# Patient Record
Sex: FEMALE | Race: WHITE | Employment: OTHER | URBAN - METROPOLITAN AREA
[De-identification: names, ages, dates, MRNs, and addresses within clinical notes are randomized per-mention and may not be internally consistent; named-entity substitution may affect disease eponyms.]

---

## 2017-01-01 ENCOUNTER — HOSPITAL ENCOUNTER (INPATIENT)
Facility: CLINIC | Age: 82
LOS: 22 days | DRG: 329 | End: 2017-07-10
Attending: EMERGENCY MEDICINE | Admitting: SURGERY
Payer: MEDICARE

## 2017-01-01 ENCOUNTER — APPOINTMENT (OUTPATIENT)
Dept: GENERAL RADIOLOGY | Facility: CLINIC | Age: 82
DRG: 329 | End: 2017-01-01
Payer: MEDICARE

## 2017-01-01 ENCOUNTER — RADIANT APPOINTMENT (OUTPATIENT)
Dept: GENERAL RADIOLOGY | Facility: CLINIC | Age: 82
End: 2017-01-01
Attending: NURSE PRACTITIONER
Payer: COMMERCIAL

## 2017-01-01 ENCOUNTER — ANESTHESIA (OUTPATIENT)
Dept: SURGERY | Facility: CLINIC | Age: 82
DRG: 329 | End: 2017-01-01
Payer: MEDICARE

## 2017-01-01 ENCOUNTER — APPOINTMENT (OUTPATIENT)
Dept: GENERAL RADIOLOGY | Facility: CLINIC | Age: 82
DRG: 329 | End: 2017-01-01
Attending: SURGERY
Payer: MEDICARE

## 2017-01-01 ENCOUNTER — APPOINTMENT (OUTPATIENT)
Dept: GENERAL RADIOLOGY | Facility: CLINIC | Age: 82
DRG: 329 | End: 2017-01-01
Attending: STUDENT IN AN ORGANIZED HEALTH CARE EDUCATION/TRAINING PROGRAM
Payer: MEDICARE

## 2017-01-01 ENCOUNTER — APPOINTMENT (OUTPATIENT)
Dept: PHYSICAL THERAPY | Facility: CLINIC | Age: 82
DRG: 329 | End: 2017-01-01
Payer: MEDICARE

## 2017-01-01 ENCOUNTER — OFFICE VISIT (OUTPATIENT)
Dept: FAMILY MEDICINE | Facility: CLINIC | Age: 82
End: 2017-01-01
Payer: COMMERCIAL

## 2017-01-01 ENCOUNTER — TELEPHONE (OUTPATIENT)
Dept: FAMILY MEDICINE | Facility: CLINIC | Age: 82
End: 2017-01-01

## 2017-01-01 ENCOUNTER — APPOINTMENT (OUTPATIENT)
Dept: OCCUPATIONAL THERAPY | Facility: CLINIC | Age: 82
DRG: 329 | End: 2017-01-01
Payer: MEDICARE

## 2017-01-01 ENCOUNTER — APPOINTMENT (OUTPATIENT)
Dept: CARDIOLOGY | Facility: CLINIC | Age: 82
DRG: 329 | End: 2017-01-01
Attending: PHYSICIAN ASSISTANT
Payer: MEDICARE

## 2017-01-01 ENCOUNTER — APPOINTMENT (OUTPATIENT)
Dept: GENERAL RADIOLOGY | Facility: CLINIC | Age: 82
DRG: 329 | End: 2017-01-01
Attending: EMERGENCY MEDICINE
Payer: MEDICARE

## 2017-01-01 ENCOUNTER — ANESTHESIA EVENT (OUTPATIENT)
Dept: SURGERY | Facility: CLINIC | Age: 82
DRG: 329 | End: 2017-01-01
Payer: MEDICARE

## 2017-01-01 ENCOUNTER — APPOINTMENT (OUTPATIENT)
Dept: PHYSICAL THERAPY | Facility: CLINIC | Age: 82
DRG: 329 | End: 2017-01-01
Attending: STUDENT IN AN ORGANIZED HEALTH CARE EDUCATION/TRAINING PROGRAM
Payer: MEDICARE

## 2017-01-01 ENCOUNTER — APPOINTMENT (OUTPATIENT)
Dept: GENERAL RADIOLOGY | Facility: CLINIC | Age: 82
DRG: 329 | End: 2017-01-01
Attending: INTERNAL MEDICINE
Payer: MEDICARE

## 2017-01-01 ENCOUNTER — APPOINTMENT (OUTPATIENT)
Dept: CT IMAGING | Facility: CLINIC | Age: 82
DRG: 329 | End: 2017-01-01
Payer: MEDICARE

## 2017-01-01 ENCOUNTER — APPOINTMENT (OUTPATIENT)
Dept: CT IMAGING | Facility: CLINIC | Age: 82
DRG: 329 | End: 2017-01-01
Attending: EMERGENCY MEDICINE
Payer: MEDICARE

## 2017-01-01 ENCOUNTER — APPOINTMENT (OUTPATIENT)
Dept: CARDIOLOGY | Facility: CLINIC | Age: 82
DRG: 329 | End: 2017-01-01
Payer: MEDICARE

## 2017-01-01 ENCOUNTER — APPOINTMENT (OUTPATIENT)
Dept: OCCUPATIONAL THERAPY | Facility: CLINIC | Age: 82
DRG: 329 | End: 2017-01-01
Attending: STUDENT IN AN ORGANIZED HEALTH CARE EDUCATION/TRAINING PROGRAM
Payer: MEDICARE

## 2017-01-01 VITALS
WEIGHT: 137.13 LBS | OXYGEN SATURATION: 97 % | DIASTOLIC BLOOD PRESSURE: 60 MMHG | HEIGHT: 64 IN | TEMPERATURE: 98 F | HEART RATE: 92 BPM | SYSTOLIC BLOOD PRESSURE: 110 MMHG | BODY MASS INDEX: 23.41 KG/M2 | RESPIRATION RATE: 16 BRPM

## 2017-01-01 VITALS
HEART RATE: 62 BPM | OXYGEN SATURATION: 90 % | SYSTOLIC BLOOD PRESSURE: 120 MMHG | DIASTOLIC BLOOD PRESSURE: 56 MMHG | TEMPERATURE: 98.4 F | RESPIRATION RATE: 28 BRPM | WEIGHT: 136.24 LBS | BODY MASS INDEX: 23.57 KG/M2

## 2017-01-01 VITALS
SYSTOLIC BLOOD PRESSURE: 100 MMHG | RESPIRATION RATE: 18 BRPM | HEART RATE: 82 BPM | DIASTOLIC BLOOD PRESSURE: 64 MMHG | BODY MASS INDEX: 22.17 KG/M2 | WEIGHT: 128.13 LBS | OXYGEN SATURATION: 98 % | TEMPERATURE: 98.3 F

## 2017-01-01 DIAGNOSIS — R10.84 ABDOMINAL PAIN, GENERALIZED: Primary | ICD-10-CM

## 2017-01-01 DIAGNOSIS — R19.8 PERFORATED VISCUS: ICD-10-CM

## 2017-01-01 DIAGNOSIS — I10 HYPERTENSION GOAL BP (BLOOD PRESSURE) < 140/90: ICD-10-CM

## 2017-01-01 DIAGNOSIS — K59.00 CONSTIPATION, UNSPECIFIED CONSTIPATION TYPE: ICD-10-CM

## 2017-01-01 DIAGNOSIS — M48.061 LUMBAR SPINAL STENOSIS: Primary | ICD-10-CM

## 2017-01-01 DIAGNOSIS — R10.84 ABDOMINAL PAIN, GENERALIZED: ICD-10-CM

## 2017-01-01 DIAGNOSIS — E78.5 HYPERLIPIDEMIA LDL GOAL <130: ICD-10-CM

## 2017-01-01 DIAGNOSIS — H61.23 BILATERAL IMPACTED CERUMEN: Primary | ICD-10-CM

## 2017-01-01 LAB
ABO + RH BLD: NORMAL
ALBUMIN SERPL-MCNC: 1.6 G/DL (ref 3.4–5)
ALBUMIN SERPL-MCNC: 1.9 G/DL (ref 3.4–5)
ALBUMIN SERPL-MCNC: 2 G/DL (ref 3.4–5)
ALBUMIN SERPL-MCNC: 2 G/DL (ref 3.4–5)
ALBUMIN SERPL-MCNC: 2.1 G/DL (ref 3.4–5)
ALBUMIN SERPL-MCNC: 2.5 G/DL (ref 3.4–5)
ALBUMIN SERPL-MCNC: 3.1 G/DL (ref 3.4–5)
ALBUMIN SERPL-MCNC: 3.2 G/DL (ref 3.4–5)
ALBUMIN SERPL-MCNC: 3.8 G/DL (ref 3.4–5)
ALBUMIN UR-MCNC: 30 MG/DL
ALBUMIN UR-MCNC: NEGATIVE MG/DL
ALBUMIN UR-MCNC: NEGATIVE MG/DL
ALP SERPL-CCNC: 115 U/L (ref 40–150)
ALP SERPL-CCNC: 128 U/L (ref 40–150)
ALP SERPL-CCNC: 148 U/L (ref 40–150)
ALP SERPL-CCNC: 176 U/L (ref 40–150)
ALP SERPL-CCNC: 214 U/L (ref 40–150)
ALP SERPL-CCNC: 269 U/L (ref 40–150)
ALP SERPL-CCNC: 280 U/L (ref 40–150)
ALP SERPL-CCNC: 282 U/L (ref 40–150)
ALP SERPL-CCNC: 89 U/L (ref 40–150)
ALP SERPL-CCNC: 94 U/L (ref 40–150)
ALT SERPL W P-5'-P-CCNC: 111 U/L (ref 0–50)
ALT SERPL W P-5'-P-CCNC: 14 U/L (ref 0–50)
ALT SERPL W P-5'-P-CCNC: 15 U/L (ref 0–50)
ALT SERPL W P-5'-P-CCNC: 245 U/L (ref 0–50)
ALT SERPL W P-5'-P-CCNC: 28 U/L (ref 0–50)
ALT SERPL W P-5'-P-CCNC: 45 U/L (ref 0–50)
ALT SERPL W P-5'-P-CCNC: 497 U/L (ref 0–50)
ALT SERPL W P-5'-P-CCNC: 67 U/L (ref 0–50)
ALT SERPL W P-5'-P-CCNC: 69 U/L (ref 0–50)
AMYLASE SERPL-CCNC: 84 U/L (ref 30–110)
ANION GAP SERPL CALCULATED.3IONS-SCNC: 10 MMOL/L (ref 3–14)
ANION GAP SERPL CALCULATED.3IONS-SCNC: 10 MMOL/L (ref 3–14)
ANION GAP SERPL CALCULATED.3IONS-SCNC: 11 MMOL/L (ref 3–14)
ANION GAP SERPL CALCULATED.3IONS-SCNC: 12 MMOL/L (ref 3–14)
ANION GAP SERPL CALCULATED.3IONS-SCNC: 15 MMOL/L (ref 3–14)
ANION GAP SERPL CALCULATED.3IONS-SCNC: 5 MMOL/L (ref 3–14)
ANION GAP SERPL CALCULATED.3IONS-SCNC: 6 MMOL/L (ref 3–14)
ANION GAP SERPL CALCULATED.3IONS-SCNC: 7 MMOL/L (ref 3–14)
ANION GAP SERPL CALCULATED.3IONS-SCNC: 8 MMOL/L (ref 3–14)
APPEARANCE UR: ABNORMAL
APPEARANCE UR: CLEAR
APPEARANCE UR: CLEAR
AST SERPL W P-5'-P-CCNC: 14 U/L (ref 0–45)
AST SERPL W P-5'-P-CCNC: 19 U/L (ref 0–45)
AST SERPL W P-5'-P-CCNC: 20 U/L (ref 0–45)
AST SERPL W P-5'-P-CCNC: 20 U/L (ref 0–45)
AST SERPL W P-5'-P-CCNC: 229 U/L (ref 0–45)
AST SERPL W P-5'-P-CCNC: 30 U/L (ref 0–45)
AST SERPL W P-5'-P-CCNC: 47 U/L (ref 0–45)
AST SERPL W P-5'-P-CCNC: 53 U/L (ref 0–45)
AST SERPL W P-5'-P-CCNC: 977 U/L (ref 0–45)
BACTERIA SPEC CULT: ABNORMAL
BACTERIA SPEC CULT: NORMAL
BASOPHILS # BLD AUTO: 0 10E9/L (ref 0–0.2)
BASOPHILS # BLD AUTO: 0 10E9/L (ref 0–0.2)
BASOPHILS NFR BLD AUTO: 0.2 %
BASOPHILS NFR BLD AUTO: 0.2 %
BILIRUB DIRECT SERPL-MCNC: 0.5 MG/DL (ref 0–0.2)
BILIRUB SERPL-MCNC: 0.3 MG/DL (ref 0.2–1.3)
BILIRUB SERPL-MCNC: 0.4 MG/DL (ref 0.2–1.3)
BILIRUB SERPL-MCNC: 0.5 MG/DL (ref 0.2–1.3)
BILIRUB SERPL-MCNC: 0.5 MG/DL (ref 0.2–1.3)
BILIRUB SERPL-MCNC: 0.6 MG/DL (ref 0.2–1.3)
BILIRUB SERPL-MCNC: 0.6 MG/DL (ref 0.2–1.3)
BILIRUB SERPL-MCNC: 1 MG/DL (ref 0.2–1.3)
BILIRUB SERPL-MCNC: 1.1 MG/DL (ref 0.2–1.3)
BILIRUB SERPL-MCNC: 2.7 MG/DL (ref 0.2–1.3)
BILIRUB UR QL STRIP: NEGATIVE
BLD GP AB SCN SERPL QL: NORMAL
BLD GP AB SCN SERPL QL: NORMAL
BLOOD BANK CMNT PATIENT-IMP: NORMAL
BLOOD BANK CMNT PATIENT-IMP: NORMAL
BUN SERPL-MCNC: 20 MG/DL (ref 7–30)
BUN SERPL-MCNC: 20 MG/DL (ref 7–30)
BUN SERPL-MCNC: 21 MG/DL (ref 7–30)
BUN SERPL-MCNC: 26 MG/DL (ref 7–30)
BUN SERPL-MCNC: 26 MG/DL (ref 7–30)
BUN SERPL-MCNC: 29 MG/DL (ref 7–30)
BUN SERPL-MCNC: 33 MG/DL (ref 7–30)
BUN SERPL-MCNC: 34 MG/DL (ref 7–30)
BUN SERPL-MCNC: 35 MG/DL (ref 7–30)
BUN SERPL-MCNC: 38 MG/DL (ref 7–30)
BUN SERPL-MCNC: 39 MG/DL (ref 7–30)
BUN SERPL-MCNC: 42 MG/DL (ref 7–30)
BUN SERPL-MCNC: 49 MG/DL (ref 7–30)
BUN SERPL-MCNC: 51 MG/DL (ref 7–30)
BUN SERPL-MCNC: 52 MG/DL (ref 7–30)
BUN SERPL-MCNC: 52 MG/DL (ref 7–30)
BUN SERPL-MCNC: 67 MG/DL (ref 7–30)
C DIFF TOX B STL QL: NORMAL
C DIFF TOX B STL QL: NORMAL
CA-I BLD-MCNC: 4.4 MG/DL (ref 4.4–5.2)
CA-I BLD-SCNC: 4.8 MG/DL (ref 4.4–5.2)
CALCIUM SERPL-MCNC: 7.7 MG/DL (ref 8.5–10.1)
CALCIUM SERPL-MCNC: 7.7 MG/DL (ref 8.5–10.1)
CALCIUM SERPL-MCNC: 7.8 MG/DL (ref 8.5–10.1)
CALCIUM SERPL-MCNC: 7.9 MG/DL (ref 8.5–10.1)
CALCIUM SERPL-MCNC: 8 MG/DL (ref 8.5–10.1)
CALCIUM SERPL-MCNC: 8.1 MG/DL (ref 8.5–10.1)
CALCIUM SERPL-MCNC: 8.1 MG/DL (ref 8.5–10.1)
CALCIUM SERPL-MCNC: 8.2 MG/DL (ref 8.5–10.1)
CALCIUM SERPL-MCNC: 8.2 MG/DL (ref 8.5–10.1)
CALCIUM SERPL-MCNC: 8.3 MG/DL (ref 8.5–10.1)
CALCIUM SERPL-MCNC: 8.3 MG/DL (ref 8.5–10.1)
CALCIUM SERPL-MCNC: 8.4 MG/DL (ref 8.5–10.1)
CALCIUM SERPL-MCNC: 8.5 MG/DL (ref 8.5–10.1)
CALCIUM SERPL-MCNC: 8.6 MG/DL (ref 8.5–10.1)
CALCIUM SERPL-MCNC: 8.7 MG/DL (ref 8.5–10.1)
CALCIUM SERPL-MCNC: 8.9 MG/DL (ref 8.5–10.1)
CALCIUM SERPL-MCNC: 9.7 MG/DL (ref 8.5–10.1)
CHLORIDE SERPL-SCNC: 100 MMOL/L (ref 94–109)
CHLORIDE SERPL-SCNC: 100 MMOL/L (ref 94–109)
CHLORIDE SERPL-SCNC: 101 MMOL/L (ref 94–109)
CHLORIDE SERPL-SCNC: 103 MMOL/L (ref 94–109)
CHLORIDE SERPL-SCNC: 104 MMOL/L (ref 94–109)
CHLORIDE SERPL-SCNC: 105 MMOL/L (ref 94–109)
CHLORIDE SERPL-SCNC: 106 MMOL/L (ref 94–109)
CHLORIDE SERPL-SCNC: 107 MMOL/L (ref 94–109)
CHLORIDE SERPL-SCNC: 109 MMOL/L (ref 94–109)
CHLORIDE SERPL-SCNC: 110 MMOL/L (ref 94–109)
CHLORIDE SERPL-SCNC: 110 MMOL/L (ref 94–109)
CHLORIDE SERPL-SCNC: 111 MMOL/L (ref 94–109)
CHLORIDE SERPL-SCNC: 98 MMOL/L (ref 94–109)
CHOLEST SERPL-MCNC: 301 MG/DL
CO2 BLDCOV-SCNC: 24 MMOL/L (ref 21–28)
CO2 BLDCOV-SCNC: 24 MMOL/L (ref 21–28)
CO2 SERPL-SCNC: 21 MMOL/L (ref 20–32)
CO2 SERPL-SCNC: 22 MMOL/L (ref 20–32)
CO2 SERPL-SCNC: 23 MMOL/L (ref 20–32)
CO2 SERPL-SCNC: 24 MMOL/L (ref 20–32)
CO2 SERPL-SCNC: 25 MMOL/L (ref 20–32)
CO2 SERPL-SCNC: 26 MMOL/L (ref 20–32)
CO2 SERPL-SCNC: 27 MMOL/L (ref 20–32)
CO2 SERPL-SCNC: 27 MMOL/L (ref 20–32)
CO2 SERPL-SCNC: 28 MMOL/L (ref 20–32)
CO2 SERPL-SCNC: 30 MMOL/L (ref 20–32)
COLOR UR AUTO: YELLOW
COPATH REPORT: NORMAL
CREAT SERPL-MCNC: 0.47 MG/DL (ref 0.52–1.04)
CREAT SERPL-MCNC: 0.48 MG/DL (ref 0.52–1.04)
CREAT SERPL-MCNC: 0.51 MG/DL (ref 0.52–1.04)
CREAT SERPL-MCNC: 0.51 MG/DL (ref 0.52–1.04)
CREAT SERPL-MCNC: 0.52 MG/DL (ref 0.52–1.04)
CREAT SERPL-MCNC: 0.56 MG/DL (ref 0.52–1.04)
CREAT SERPL-MCNC: 0.56 MG/DL (ref 0.52–1.04)
CREAT SERPL-MCNC: 0.58 MG/DL (ref 0.52–1.04)
CREAT SERPL-MCNC: 0.59 MG/DL (ref 0.52–1.04)
CREAT SERPL-MCNC: 0.59 MG/DL (ref 0.52–1.04)
CREAT SERPL-MCNC: 0.6 MG/DL (ref 0.52–1.04)
CREAT SERPL-MCNC: 0.61 MG/DL (ref 0.52–1.04)
CREAT SERPL-MCNC: 0.62 MG/DL (ref 0.52–1.04)
CREAT SERPL-MCNC: 0.63 MG/DL (ref 0.52–1.04)
CREAT SERPL-MCNC: 0.65 MG/DL (ref 0.52–1.04)
CREAT SERPL-MCNC: 0.66 MG/DL (ref 0.52–1.04)
CREAT SERPL-MCNC: 0.68 MG/DL (ref 0.52–1.04)
CREAT SERPL-MCNC: 0.69 MG/DL (ref 0.52–1.04)
CREAT SERPL-MCNC: 0.73 MG/DL (ref 0.52–1.04)
CREAT SERPL-MCNC: 0.73 MG/DL (ref 0.52–1.04)
CREAT SERPL-MCNC: 0.74 MG/DL (ref 0.52–1.04)
CREAT SERPL-MCNC: 0.85 MG/DL (ref 0.52–1.04)
CREAT SERPL-MCNC: 0.87 MG/DL (ref 0.52–1.04)
CREAT SERPL-MCNC: 1.2 MG/DL (ref 0.52–1.04)
DIFFERENTIAL METHOD BLD: ABNORMAL
DIFFERENTIAL METHOD BLD: NORMAL
EOSINOPHIL # BLD AUTO: 0 10E9/L (ref 0–0.7)
EOSINOPHIL # BLD AUTO: 0.1 10E9/L (ref 0–0.7)
EOSINOPHIL NFR BLD AUTO: 0.2 %
EOSINOPHIL NFR BLD AUTO: 0.8 %
ERYTHROCYTE [DISTWIDTH] IN BLOOD BY AUTOMATED COUNT: 14 % (ref 10–15)
ERYTHROCYTE [DISTWIDTH] IN BLOOD BY AUTOMATED COUNT: 14.3 % (ref 10–15)
ERYTHROCYTE [DISTWIDTH] IN BLOOD BY AUTOMATED COUNT: 14.7 % (ref 10–15)
ERYTHROCYTE [DISTWIDTH] IN BLOOD BY AUTOMATED COUNT: 14.7 % (ref 10–15)
ERYTHROCYTE [DISTWIDTH] IN BLOOD BY AUTOMATED COUNT: 14.8 % (ref 10–15)
ERYTHROCYTE [DISTWIDTH] IN BLOOD BY AUTOMATED COUNT: 14.9 % (ref 10–15)
ERYTHROCYTE [DISTWIDTH] IN BLOOD BY AUTOMATED COUNT: 15 % (ref 10–15)
ERYTHROCYTE [DISTWIDTH] IN BLOOD BY AUTOMATED COUNT: 15 % (ref 10–15)
ERYTHROCYTE [DISTWIDTH] IN BLOOD BY AUTOMATED COUNT: 15.1 % (ref 10–15)
ERYTHROCYTE [DISTWIDTH] IN BLOOD BY AUTOMATED COUNT: 15.2 % (ref 10–15)
ERYTHROCYTE [DISTWIDTH] IN BLOOD BY AUTOMATED COUNT: 15.2 % (ref 10–15)
ERYTHROCYTE [DISTWIDTH] IN BLOOD BY AUTOMATED COUNT: 15.3 % (ref 10–15)
ERYTHROCYTE [DISTWIDTH] IN BLOOD BY AUTOMATED COUNT: 15.4 % (ref 10–15)
ERYTHROCYTE [DISTWIDTH] IN BLOOD BY AUTOMATED COUNT: 15.5 % (ref 10–15)
GFR SERPL CREATININE-BSD FRML MDRD: 42 ML/MIN/1.7M2
GFR SERPL CREATININE-BSD FRML MDRD: 61 ML/MIN/1.7M2
GFR SERPL CREATININE-BSD FRML MDRD: 63 ML/MIN/1.7M2
GFR SERPL CREATININE-BSD FRML MDRD: 73 ML/MIN/1.7M2
GFR SERPL CREATININE-BSD FRML MDRD: 74 ML/MIN/1.7M2
GFR SERPL CREATININE-BSD FRML MDRD: 75 ML/MIN/1.7M2
GFR SERPL CREATININE-BSD FRML MDRD: 79 ML/MIN/1.7M2
GFR SERPL CREATININE-BSD FRML MDRD: 80 ML/MIN/1.7M2
GFR SERPL CREATININE-BSD FRML MDRD: 84 ML/MIN/1.7M2
GFR SERPL CREATININE-BSD FRML MDRD: 85 ML/MIN/1.7M2
GFR SERPL CREATININE-BSD FRML MDRD: 88 ML/MIN/1.7M2
GFR SERPL CREATININE-BSD FRML MDRD: ABNORMAL ML/MIN/1.7M2
GLUCOSE BLD-MCNC: 115 MG/DL (ref 70–99)
GLUCOSE BLDC GLUCOMTR-MCNC: 100 MG/DL (ref 70–99)
GLUCOSE BLDC GLUCOMTR-MCNC: 102 MG/DL (ref 70–99)
GLUCOSE BLDC GLUCOMTR-MCNC: 103 MG/DL (ref 70–99)
GLUCOSE BLDC GLUCOMTR-MCNC: 109 MG/DL (ref 70–99)
GLUCOSE BLDC GLUCOMTR-MCNC: 110 MG/DL (ref 70–99)
GLUCOSE BLDC GLUCOMTR-MCNC: 111 MG/DL (ref 70–99)
GLUCOSE BLDC GLUCOMTR-MCNC: 111 MG/DL (ref 70–99)
GLUCOSE BLDC GLUCOMTR-MCNC: 112 MG/DL (ref 70–99)
GLUCOSE BLDC GLUCOMTR-MCNC: 113 MG/DL (ref 70–99)
GLUCOSE BLDC GLUCOMTR-MCNC: 113 MG/DL (ref 70–99)
GLUCOSE BLDC GLUCOMTR-MCNC: 116 MG/DL (ref 70–99)
GLUCOSE BLDC GLUCOMTR-MCNC: 117 MG/DL (ref 70–99)
GLUCOSE BLDC GLUCOMTR-MCNC: 117 MG/DL (ref 70–99)
GLUCOSE BLDC GLUCOMTR-MCNC: 118 MG/DL (ref 70–99)
GLUCOSE BLDC GLUCOMTR-MCNC: 118 MG/DL (ref 70–99)
GLUCOSE BLDC GLUCOMTR-MCNC: 121 MG/DL (ref 70–99)
GLUCOSE BLDC GLUCOMTR-MCNC: 122 MG/DL (ref 70–99)
GLUCOSE BLDC GLUCOMTR-MCNC: 123 MG/DL (ref 70–99)
GLUCOSE BLDC GLUCOMTR-MCNC: 124 MG/DL (ref 70–99)
GLUCOSE BLDC GLUCOMTR-MCNC: 125 MG/DL (ref 70–99)
GLUCOSE BLDC GLUCOMTR-MCNC: 125 MG/DL (ref 70–99)
GLUCOSE BLDC GLUCOMTR-MCNC: 126 MG/DL (ref 70–99)
GLUCOSE BLDC GLUCOMTR-MCNC: 127 MG/DL (ref 70–99)
GLUCOSE BLDC GLUCOMTR-MCNC: 129 MG/DL (ref 70–99)
GLUCOSE BLDC GLUCOMTR-MCNC: 129 MG/DL (ref 70–99)
GLUCOSE BLDC GLUCOMTR-MCNC: 130 MG/DL (ref 70–99)
GLUCOSE BLDC GLUCOMTR-MCNC: 130 MG/DL (ref 70–99)
GLUCOSE BLDC GLUCOMTR-MCNC: 131 MG/DL (ref 70–99)
GLUCOSE BLDC GLUCOMTR-MCNC: 133 MG/DL (ref 70–99)
GLUCOSE BLDC GLUCOMTR-MCNC: 134 MG/DL (ref 70–99)
GLUCOSE BLDC GLUCOMTR-MCNC: 134 MG/DL (ref 70–99)
GLUCOSE BLDC GLUCOMTR-MCNC: 135 MG/DL (ref 70–99)
GLUCOSE BLDC GLUCOMTR-MCNC: 136 MG/DL (ref 70–99)
GLUCOSE BLDC GLUCOMTR-MCNC: 138 MG/DL (ref 70–99)
GLUCOSE BLDC GLUCOMTR-MCNC: 138 MG/DL (ref 70–99)
GLUCOSE BLDC GLUCOMTR-MCNC: 140 MG/DL (ref 70–99)
GLUCOSE BLDC GLUCOMTR-MCNC: 141 MG/DL (ref 70–99)
GLUCOSE BLDC GLUCOMTR-MCNC: 141 MG/DL (ref 70–99)
GLUCOSE BLDC GLUCOMTR-MCNC: 142 MG/DL (ref 70–99)
GLUCOSE BLDC GLUCOMTR-MCNC: 143 MG/DL (ref 70–99)
GLUCOSE BLDC GLUCOMTR-MCNC: 143 MG/DL (ref 70–99)
GLUCOSE BLDC GLUCOMTR-MCNC: 144 MG/DL (ref 70–99)
GLUCOSE BLDC GLUCOMTR-MCNC: 144 MG/DL (ref 70–99)
GLUCOSE BLDC GLUCOMTR-MCNC: 145 MG/DL (ref 70–99)
GLUCOSE BLDC GLUCOMTR-MCNC: 146 MG/DL (ref 70–99)
GLUCOSE BLDC GLUCOMTR-MCNC: 148 MG/DL (ref 70–99)
GLUCOSE BLDC GLUCOMTR-MCNC: 148 MG/DL (ref 70–99)
GLUCOSE BLDC GLUCOMTR-MCNC: 149 MG/DL (ref 70–99)
GLUCOSE BLDC GLUCOMTR-MCNC: 151 MG/DL (ref 70–99)
GLUCOSE BLDC GLUCOMTR-MCNC: 152 MG/DL (ref 70–99)
GLUCOSE BLDC GLUCOMTR-MCNC: 153 MG/DL (ref 70–99)
GLUCOSE BLDC GLUCOMTR-MCNC: 155 MG/DL (ref 70–99)
GLUCOSE BLDC GLUCOMTR-MCNC: 156 MG/DL (ref 70–99)
GLUCOSE BLDC GLUCOMTR-MCNC: 156 MG/DL (ref 70–99)
GLUCOSE BLDC GLUCOMTR-MCNC: 157 MG/DL (ref 70–99)
GLUCOSE BLDC GLUCOMTR-MCNC: 159 MG/DL (ref 70–99)
GLUCOSE BLDC GLUCOMTR-MCNC: 160 MG/DL (ref 70–99)
GLUCOSE BLDC GLUCOMTR-MCNC: 162 MG/DL (ref 70–99)
GLUCOSE BLDC GLUCOMTR-MCNC: 163 MG/DL (ref 70–99)
GLUCOSE BLDC GLUCOMTR-MCNC: 163 MG/DL (ref 70–99)
GLUCOSE BLDC GLUCOMTR-MCNC: 165 MG/DL (ref 70–99)
GLUCOSE BLDC GLUCOMTR-MCNC: 166 MG/DL (ref 70–99)
GLUCOSE BLDC GLUCOMTR-MCNC: 167 MG/DL (ref 70–99)
GLUCOSE BLDC GLUCOMTR-MCNC: 167 MG/DL (ref 70–99)
GLUCOSE BLDC GLUCOMTR-MCNC: 168 MG/DL (ref 70–99)
GLUCOSE BLDC GLUCOMTR-MCNC: 169 MG/DL (ref 70–99)
GLUCOSE BLDC GLUCOMTR-MCNC: 169 MG/DL (ref 70–99)
GLUCOSE BLDC GLUCOMTR-MCNC: 170 MG/DL (ref 70–99)
GLUCOSE BLDC GLUCOMTR-MCNC: 172 MG/DL (ref 70–99)
GLUCOSE BLDC GLUCOMTR-MCNC: 172 MG/DL (ref 70–99)
GLUCOSE BLDC GLUCOMTR-MCNC: 177 MG/DL (ref 70–99)
GLUCOSE BLDC GLUCOMTR-MCNC: 179 MG/DL (ref 70–99)
GLUCOSE BLDC GLUCOMTR-MCNC: 182 MG/DL (ref 70–99)
GLUCOSE BLDC GLUCOMTR-MCNC: 184 MG/DL (ref 70–99)
GLUCOSE BLDC GLUCOMTR-MCNC: 184 MG/DL (ref 70–99)
GLUCOSE BLDC GLUCOMTR-MCNC: 187 MG/DL (ref 70–99)
GLUCOSE BLDC GLUCOMTR-MCNC: 190 MG/DL (ref 70–99)
GLUCOSE BLDC GLUCOMTR-MCNC: 218 MG/DL (ref 70–99)
GLUCOSE BLDC GLUCOMTR-MCNC: 75 MG/DL (ref 70–99)
GLUCOSE BLDC GLUCOMTR-MCNC: 86 MG/DL (ref 70–99)
GLUCOSE BLDC GLUCOMTR-MCNC: 89 MG/DL (ref 70–99)
GLUCOSE BLDC GLUCOMTR-MCNC: 91 MG/DL (ref 70–99)
GLUCOSE BLDC GLUCOMTR-MCNC: 91 MG/DL (ref 70–99)
GLUCOSE BLDC GLUCOMTR-MCNC: 93 MG/DL (ref 70–99)
GLUCOSE BLDC GLUCOMTR-MCNC: 94 MG/DL (ref 70–99)
GLUCOSE BLDC GLUCOMTR-MCNC: 96 MG/DL (ref 70–99)
GLUCOSE BLDC GLUCOMTR-MCNC: 97 MG/DL (ref 70–99)
GLUCOSE BLDC GLUCOMTR-MCNC: 99 MG/DL (ref 70–99)
GLUCOSE SERPL-MCNC: 105 MG/DL (ref 70–99)
GLUCOSE SERPL-MCNC: 124 MG/DL (ref 70–99)
GLUCOSE SERPL-MCNC: 132 MG/DL (ref 70–99)
GLUCOSE SERPL-MCNC: 133 MG/DL (ref 70–99)
GLUCOSE SERPL-MCNC: 134 MG/DL (ref 70–99)
GLUCOSE SERPL-MCNC: 135 MG/DL (ref 70–99)
GLUCOSE SERPL-MCNC: 138 MG/DL (ref 70–99)
GLUCOSE SERPL-MCNC: 139 MG/DL (ref 70–99)
GLUCOSE SERPL-MCNC: 140 MG/DL (ref 70–99)
GLUCOSE SERPL-MCNC: 141 MG/DL (ref 70–99)
GLUCOSE SERPL-MCNC: 146 MG/DL (ref 70–99)
GLUCOSE SERPL-MCNC: 147 MG/DL (ref 70–99)
GLUCOSE SERPL-MCNC: 148 MG/DL (ref 70–99)
GLUCOSE SERPL-MCNC: 148 MG/DL (ref 70–99)
GLUCOSE SERPL-MCNC: 151 MG/DL (ref 70–99)
GLUCOSE SERPL-MCNC: 160 MG/DL (ref 70–99)
GLUCOSE SERPL-MCNC: 160 MG/DL (ref 70–99)
GLUCOSE SERPL-MCNC: 167 MG/DL (ref 70–99)
GLUCOSE SERPL-MCNC: 168 MG/DL (ref 70–99)
GLUCOSE SERPL-MCNC: 170 MG/DL (ref 70–99)
GLUCOSE SERPL-MCNC: 172 MG/DL (ref 70–99)
GLUCOSE SERPL-MCNC: 181 MG/DL (ref 70–99)
GLUCOSE SERPL-MCNC: 182 MG/DL (ref 70–99)
GLUCOSE SERPL-MCNC: 99 MG/DL (ref 70–99)
GLUCOSE UR STRIP-MCNC: NEGATIVE MG/DL
GRAM STN SPEC: NORMAL
HCT VFR BLD AUTO: 27 % (ref 35–47)
HCT VFR BLD AUTO: 29.2 % (ref 35–47)
HCT VFR BLD AUTO: 30.3 % (ref 35–47)
HCT VFR BLD AUTO: 30.3 % (ref 35–47)
HCT VFR BLD AUTO: 30.8 % (ref 35–47)
HCT VFR BLD AUTO: 33.3 % (ref 35–47)
HCT VFR BLD AUTO: 34.2 % (ref 35–47)
HCT VFR BLD AUTO: 34.3 % (ref 35–47)
HCT VFR BLD AUTO: 34.4 % (ref 35–47)
HCT VFR BLD AUTO: 34.8 % (ref 35–47)
HCT VFR BLD AUTO: 34.9 % (ref 35–47)
HCT VFR BLD AUTO: 35.1 % (ref 35–47)
HCT VFR BLD AUTO: 35.7 % (ref 35–47)
HCT VFR BLD AUTO: 36.4 % (ref 35–47)
HCT VFR BLD AUTO: 36.5 % (ref 35–47)
HCT VFR BLD AUTO: 36.6 % (ref 35–47)
HCT VFR BLD AUTO: 37.5 % (ref 35–47)
HCT VFR BLD AUTO: 37.6 % (ref 35–47)
HCT VFR BLD AUTO: 37.8 % (ref 35–47)
HCT VFR BLD AUTO: 38.1 % (ref 35–47)
HCT VFR BLD AUTO: 38.1 % (ref 35–47)
HCT VFR BLD AUTO: 39 % (ref 35–47)
HCT VFR BLD AUTO: 39.2 % (ref 35–47)
HCT VFR BLD AUTO: 41.6 % (ref 35–47)
HCT VFR BLD AUTO: 41.8 % (ref 35–47)
HCT VFR BLD AUTO: 42.8 % (ref 35–47)
HCT VFR BLD CALC: 30 %PCV (ref 35–47)
HDLC SERPL-MCNC: 57 MG/DL
HGB BLD CALC-MCNC: 10.2 G/DL (ref 11.7–15.7)
HGB BLD-MCNC: 10.6 G/DL (ref 11.7–15.7)
HGB BLD-MCNC: 10.7 G/DL (ref 11.7–15.7)
HGB BLD-MCNC: 10.9 G/DL (ref 11.7–15.7)
HGB BLD-MCNC: 10.9 G/DL (ref 11.7–15.7)
HGB BLD-MCNC: 11.2 G/DL (ref 11.7–15.7)
HGB BLD-MCNC: 11.4 G/DL (ref 11.7–15.7)
HGB BLD-MCNC: 11.6 G/DL (ref 11.7–15.7)
HGB BLD-MCNC: 11.7 G/DL (ref 11.7–15.7)
HGB BLD-MCNC: 12.1 G/DL (ref 11.7–15.7)
HGB BLD-MCNC: 12.4 G/DL (ref 11.7–15.7)
HGB BLD-MCNC: 12.6 G/DL (ref 11.7–15.7)
HGB BLD-MCNC: 12.6 G/DL (ref 11.7–15.7)
HGB BLD-MCNC: 12.7 G/DL (ref 11.7–15.7)
HGB BLD-MCNC: 12.8 G/DL (ref 11.7–15.7)
HGB BLD-MCNC: 12.8 G/DL (ref 11.7–15.7)
HGB BLD-MCNC: 13.2 G/DL (ref 11.7–15.7)
HGB BLD-MCNC: 13.5 G/DL (ref 11.7–15.7)
HGB BLD-MCNC: 13.8 G/DL (ref 11.7–15.7)
HGB BLD-MCNC: 14.2 G/DL (ref 11.7–15.7)
HGB BLD-MCNC: 8.7 G/DL (ref 11.7–15.7)
HGB BLD-MCNC: 9.2 G/DL (ref 11.7–15.7)
HGB BLD-MCNC: 9.6 G/DL (ref 11.7–15.7)
HGB BLD-MCNC: 9.7 G/DL (ref 11.7–15.7)
HGB BLD-MCNC: 9.9 G/DL (ref 11.7–15.7)
HGB UR QL STRIP: ABNORMAL
HGB UR QL STRIP: ABNORMAL
HGB UR QL STRIP: NEGATIVE
IMM GRANULOCYTES # BLD: 0 10E9/L (ref 0–0.4)
IMM GRANULOCYTES NFR BLD: 0.4 %
INR BLD: 1.1 (ref 0.86–1.14)
INR PPP: 1.09 (ref 0.86–1.14)
INR PPP: 1.11 (ref 0.86–1.14)
INR PPP: 1.14 (ref 0.86–1.14)
INR PPP: 1.16 (ref 0.86–1.14)
INR PPP: 1.27 (ref 0.86–1.14)
INTERPRETATION ECG - MUSE: NORMAL
KETONES UR STRIP-MCNC: NEGATIVE MG/DL
LACTATE BLD-SCNC: 0.8 MMOL/L (ref 0.7–2.1)
LACTATE BLD-SCNC: 0.8 MMOL/L (ref 0.7–2.1)
LACTATE BLD-SCNC: 0.9 MMOL/L (ref 0.7–2.1)
LACTATE BLD-SCNC: 0.9 MMOL/L (ref 0.7–2.1)
LACTATE BLD-SCNC: 1 MMOL/L (ref 0.7–2.1)
LACTATE BLD-SCNC: 1.1 MMOL/L (ref 0.7–2.1)
LACTATE BLD-SCNC: 1.1 MMOL/L (ref 0.7–2.1)
LACTATE BLD-SCNC: 1.2 MMOL/L (ref 0.7–2.1)
LACTATE BLD-SCNC: 1.2 MMOL/L (ref 0.7–2.1)
LACTATE BLD-SCNC: 1.3 MMOL/L (ref 0.7–2.1)
LACTATE BLD-SCNC: 1.5 MMOL/L (ref 0.7–2.1)
LACTATE BLD-SCNC: 1.9 MMOL/L (ref 0.7–2.1)
LACTATE BLD-SCNC: 2.2 MMOL/L (ref 0.7–2.1)
LACTATE BLD-SCNC: 3 MMOL/L (ref 0.7–2.1)
LACTATE BLD-SCNC: 3.8 MMOL/L (ref 0.7–2.1)
LDLC SERPL CALC-MCNC: 205 MG/DL
LEUKOCYTE ESTERASE UR QL STRIP: ABNORMAL
LEUKOCYTE ESTERASE UR QL STRIP: NEGATIVE
LEUKOCYTE ESTERASE UR QL STRIP: NEGATIVE
LIPASE SERPL-CCNC: 1224 U/L (ref 73–393)
LIPASE SERPL-CCNC: 330 U/L (ref 73–393)
LYMPHOCYTES # BLD AUTO: 1 10E9/L (ref 0.8–5.3)
LYMPHOCYTES # BLD AUTO: 3 10E9/L (ref 0.8–5.3)
LYMPHOCYTES NFR BLD AUTO: 12.2 %
LYMPHOCYTES NFR BLD AUTO: 24.2 %
Lab: ABNORMAL
Lab: NORMAL
Lab: NORMAL
MAGNESIUM SERPL-MCNC: 1.7 MG/DL (ref 1.6–2.3)
MAGNESIUM SERPL-MCNC: 1.7 MG/DL (ref 1.6–2.3)
MAGNESIUM SERPL-MCNC: 1.8 MG/DL (ref 1.6–2.3)
MAGNESIUM SERPL-MCNC: 1.8 MG/DL (ref 1.6–2.3)
MAGNESIUM SERPL-MCNC: 1.9 MG/DL (ref 1.6–2.3)
MAGNESIUM SERPL-MCNC: 2 MG/DL (ref 1.6–2.3)
MAGNESIUM SERPL-MCNC: 2.1 MG/DL (ref 1.6–2.3)
MAGNESIUM SERPL-MCNC: 2.2 MG/DL (ref 1.6–2.3)
MAGNESIUM SERPL-MCNC: 2.4 MG/DL (ref 1.6–2.3)
MAGNESIUM SERPL-MCNC: 2.4 MG/DL (ref 1.6–2.3)
MAGNESIUM SERPL-MCNC: 2.6 MG/DL (ref 1.6–2.3)
MCH RBC QN AUTO: 29.4 PG (ref 26.5–33)
MCH RBC QN AUTO: 29.5 PG (ref 26.5–33)
MCH RBC QN AUTO: 29.5 PG (ref 26.5–33)
MCH RBC QN AUTO: 29.6 PG (ref 26.5–33)
MCH RBC QN AUTO: 29.6 PG (ref 26.5–33)
MCH RBC QN AUTO: 29.7 PG (ref 26.5–33)
MCH RBC QN AUTO: 29.7 PG (ref 26.5–33)
MCH RBC QN AUTO: 29.8 PG (ref 26.5–33)
MCH RBC QN AUTO: 29.9 PG (ref 26.5–33)
MCH RBC QN AUTO: 29.9 PG (ref 26.5–33)
MCH RBC QN AUTO: 30 PG (ref 26.5–33)
MCH RBC QN AUTO: 30.1 PG (ref 26.5–33)
MCH RBC QN AUTO: 30.2 PG (ref 26.5–33)
MCH RBC QN AUTO: 30.3 PG (ref 26.5–33)
MCH RBC QN AUTO: 30.4 PG (ref 26.5–33)
MCH RBC QN AUTO: 30.4 PG (ref 26.5–33)
MCH RBC QN AUTO: 31.1 PG (ref 26.5–33)
MCHC RBC AUTO-ENTMCNC: 31.1 G/DL (ref 31.5–36.5)
MCHC RBC AUTO-ENTMCNC: 31.2 G/DL (ref 31.5–36.5)
MCHC RBC AUTO-ENTMCNC: 31.3 G/DL (ref 31.5–36.5)
MCHC RBC AUTO-ENTMCNC: 31.5 G/DL (ref 31.5–36.5)
MCHC RBC AUTO-ENTMCNC: 31.5 G/DL (ref 31.5–36.5)
MCHC RBC AUTO-ENTMCNC: 31.8 G/DL (ref 31.5–36.5)
MCHC RBC AUTO-ENTMCNC: 31.8 G/DL (ref 31.5–36.5)
MCHC RBC AUTO-ENTMCNC: 31.9 G/DL (ref 31.5–36.5)
MCHC RBC AUTO-ENTMCNC: 32 G/DL (ref 31.5–36.5)
MCHC RBC AUTO-ENTMCNC: 32.1 G/DL (ref 31.5–36.5)
MCHC RBC AUTO-ENTMCNC: 32.2 G/DL (ref 31.5–36.5)
MCHC RBC AUTO-ENTMCNC: 32.5 G/DL (ref 31.5–36.5)
MCHC RBC AUTO-ENTMCNC: 32.7 G/DL (ref 31.5–36.5)
MCHC RBC AUTO-ENTMCNC: 33 G/DL (ref 31.5–36.5)
MCHC RBC AUTO-ENTMCNC: 33.1 G/DL (ref 31.5–36.5)
MCHC RBC AUTO-ENTMCNC: 33.2 G/DL (ref 31.5–36.5)
MCHC RBC AUTO-ENTMCNC: 33.3 G/DL (ref 31.5–36.5)
MCHC RBC AUTO-ENTMCNC: 33.6 G/DL (ref 31.5–36.5)
MCHC RBC AUTO-ENTMCNC: 33.8 G/DL (ref 31.5–36.5)
MCHC RBC AUTO-ENTMCNC: 33.8 G/DL (ref 31.5–36.5)
MCHC RBC AUTO-ENTMCNC: 34 G/DL (ref 31.5–36.5)
MCV RBC AUTO: 89 FL (ref 78–100)
MCV RBC AUTO: 90 FL (ref 78–100)
MCV RBC AUTO: 91 FL (ref 78–100)
MCV RBC AUTO: 92 FL (ref 78–100)
MCV RBC AUTO: 92 FL (ref 78–100)
MCV RBC AUTO: 93 FL (ref 78–100)
MCV RBC AUTO: 93 FL (ref 78–100)
MCV RBC AUTO: 94 FL (ref 78–100)
MCV RBC AUTO: 95 FL (ref 78–100)
MICRO REPORT STATUS: ABNORMAL
MICRO REPORT STATUS: NORMAL
MICRO REPORT STATUS: NORMAL
MICROORGANISM SPEC CULT: ABNORMAL
MONOCYTES # BLD AUTO: 0.3 10E9/L (ref 0–1.3)
MONOCYTES # BLD AUTO: 0.9 10E9/L (ref 0–1.3)
MONOCYTES NFR BLD AUTO: 3.3 %
MONOCYTES NFR BLD AUTO: 7.1 %
MRSA DNA SPEC QL NAA+PROBE: NORMAL
MRSA DNA SPEC QL NAA+PROBE: NORMAL
MUCOUS THREADS #/AREA URNS LPF: PRESENT /LPF
NEUTROPHILS # BLD AUTO: 6.9 10E9/L (ref 1.6–8.3)
NEUTROPHILS # BLD AUTO: 8.3 10E9/L (ref 1.6–8.3)
NEUTROPHILS NFR BLD AUTO: 67.7 %
NEUTROPHILS NFR BLD AUTO: 83.7 %
NITRATE UR QL: NEGATIVE
NONHDLC SERPL-MCNC: 244 MG/DL
NRBC # BLD AUTO: 0 10*3/UL
NRBC BLD AUTO-RTO: 0 /100
NT-PROBNP SERPL-MCNC: 426 PG/ML (ref 0–1800)
PCO2 BLDV: 29 MM HG (ref 40–50)
PCO2 BLDV: 33 MM HG (ref 40–50)
PH BLDV: 7.48 PH (ref 7.32–7.43)
PH BLDV: 7.52 PH (ref 7.32–7.43)
PH UR STRIP: 5 PH (ref 5–7)
PHOSPHATE SERPL-MCNC: 0.6 MG/DL (ref 2.5–4.5)
PHOSPHATE SERPL-MCNC: 0.6 MG/DL (ref 2.5–4.5)
PHOSPHATE SERPL-MCNC: 1.1 MG/DL (ref 2.5–4.5)
PHOSPHATE SERPL-MCNC: 1.5 MG/DL (ref 2.5–4.5)
PHOSPHATE SERPL-MCNC: 2 MG/DL (ref 2.5–4.5)
PHOSPHATE SERPL-MCNC: 2.1 MG/DL (ref 2.5–4.5)
PHOSPHATE SERPL-MCNC: 2.2 MG/DL (ref 2.5–4.5)
PHOSPHATE SERPL-MCNC: 2.3 MG/DL (ref 2.5–4.5)
PHOSPHATE SERPL-MCNC: 2.5 MG/DL (ref 2.5–4.5)
PHOSPHATE SERPL-MCNC: 2.6 MG/DL (ref 2.5–4.5)
PHOSPHATE SERPL-MCNC: 2.6 MG/DL (ref 2.5–4.5)
PHOSPHATE SERPL-MCNC: 2.7 MG/DL (ref 2.5–4.5)
PHOSPHATE SERPL-MCNC: 2.8 MG/DL (ref 2.5–4.5)
PHOSPHATE SERPL-MCNC: 3 MG/DL (ref 2.5–4.5)
PHOSPHATE SERPL-MCNC: 3.2 MG/DL (ref 2.5–4.5)
PHOSPHATE SERPL-MCNC: 3.3 MG/DL (ref 2.5–4.5)
PHOSPHATE SERPL-MCNC: 3.3 MG/DL (ref 2.5–4.5)
PHOSPHATE SERPL-MCNC: 3.4 MG/DL (ref 2.5–4.5)
PHOSPHATE SERPL-MCNC: 3.4 MG/DL (ref 2.5–4.5)
PHOSPHATE SERPL-MCNC: 3.5 MG/DL (ref 2.5–4.5)
PHOSPHATE SERPL-MCNC: 3.6 MG/DL (ref 2.5–4.5)
PHOSPHATE SERPL-MCNC: 3.8 MG/DL (ref 2.5–4.5)
PHOSPHATE SERPL-MCNC: 4.4 MG/DL (ref 2.5–4.5)
PHOSPHATE SERPL-MCNC: 4.8 MG/DL (ref 2.5–4.5)
PHOSPHATE SERPL-MCNC: 5.8 MG/DL (ref 2.5–4.5)
PLATELET # BLD AUTO: 206 10E9/L (ref 150–450)
PLATELET # BLD AUTO: 247 10E9/L (ref 150–450)
PLATELET # BLD AUTO: 249 10E9/L (ref 150–450)
PLATELET # BLD AUTO: 257 10E9/L (ref 150–450)
PLATELET # BLD AUTO: 258 10E9/L (ref 150–450)
PLATELET # BLD AUTO: 261 10E9/L (ref 150–450)
PLATELET # BLD AUTO: 262 10E9/L (ref 150–450)
PLATELET # BLD AUTO: 287 10E9/L (ref 150–450)
PLATELET # BLD AUTO: 296 10E9/L (ref 150–450)
PLATELET # BLD AUTO: 330 10E9/L (ref 150–450)
PLATELET # BLD AUTO: 353 10E9/L (ref 150–450)
PLATELET # BLD AUTO: 363 10E9/L (ref 150–450)
PLATELET # BLD AUTO: 381 10E9/L (ref 150–450)
PLATELET # BLD AUTO: 398 10E9/L (ref 150–450)
PLATELET # BLD AUTO: 400 10E9/L (ref 150–450)
PLATELET # BLD AUTO: 431 10E9/L (ref 150–450)
PLATELET # BLD AUTO: 432 10E9/L (ref 150–450)
PLATELET # BLD AUTO: 464 10E9/L (ref 150–450)
PLATELET # BLD AUTO: 538 10E9/L (ref 150–450)
PLATELET # BLD AUTO: 595 10E9/L (ref 150–450)
PLATELET # BLD AUTO: 614 10E9/L (ref 150–450)
PLATELET # BLD AUTO: 622 10E9/L (ref 150–450)
PLATELET # BLD AUTO: 633 10E9/L (ref 150–450)
PLATELET # BLD AUTO: 648 10E9/L (ref 150–450)
PLATELET # BLD AUTO: 652 10E9/L (ref 150–450)
PLATELET # BLD AUTO: 663 10E9/L (ref 150–450)
PLATELET # BLD AUTO: 671 10E9/L (ref 150–450)
PO2 BLDV: 41 MM HG (ref 25–47)
PO2 BLDV: 71 MM HG (ref 25–47)
POTASSIUM BLD-SCNC: 4.1 MMOL/L (ref 3.4–5.3)
POTASSIUM SERPL-SCNC: 2.9 MMOL/L (ref 3.4–5.3)
POTASSIUM SERPL-SCNC: 3.3 MMOL/L (ref 3.4–5.3)
POTASSIUM SERPL-SCNC: 3.6 MMOL/L (ref 3.4–5.3)
POTASSIUM SERPL-SCNC: 3.7 MMOL/L (ref 3.4–5.3)
POTASSIUM SERPL-SCNC: 3.7 MMOL/L (ref 3.4–5.3)
POTASSIUM SERPL-SCNC: 3.8 MMOL/L (ref 3.4–5.3)
POTASSIUM SERPL-SCNC: 3.8 MMOL/L (ref 3.4–5.3)
POTASSIUM SERPL-SCNC: 3.9 MMOL/L (ref 3.4–5.3)
POTASSIUM SERPL-SCNC: 4.1 MMOL/L (ref 3.4–5.3)
POTASSIUM SERPL-SCNC: 4.1 MMOL/L (ref 3.4–5.3)
POTASSIUM SERPL-SCNC: 4.2 MMOL/L (ref 3.4–5.3)
POTASSIUM SERPL-SCNC: 4.3 MMOL/L (ref 3.4–5.3)
POTASSIUM SERPL-SCNC: 4.3 MMOL/L (ref 3.4–5.3)
POTASSIUM SERPL-SCNC: 4.4 MMOL/L (ref 3.4–5.3)
POTASSIUM SERPL-SCNC: 4.5 MMOL/L (ref 3.4–5.3)
POTASSIUM SERPL-SCNC: 4.6 MMOL/L (ref 3.4–5.3)
POTASSIUM SERPL-SCNC: 4.6 MMOL/L (ref 3.4–5.3)
POTASSIUM SERPL-SCNC: 4.7 MMOL/L (ref 3.4–5.3)
POTASSIUM SERPL-SCNC: 4.8 MMOL/L (ref 3.4–5.3)
POTASSIUM SERPL-SCNC: 4.9 MMOL/L (ref 3.4–5.3)
PREALB SERPL IA-MCNC: 16 MG/DL (ref 15–45)
PREALB SERPL IA-MCNC: 18 MG/DL (ref 15–45)
PREALB SERPL IA-MCNC: 8 MG/DL (ref 15–45)
PROCALCITONIN SERPL-MCNC: 0.08 NG/ML
PROCALCITONIN SERPL-MCNC: 0.11 NG/ML
PROCALCITONIN SERPL-MCNC: 0.13 NG/ML
PROCALCITONIN SERPL-MCNC: 0.28 NG/ML
PROCALCITONIN SERPL-MCNC: 0.32 NG/ML
PROCALCITONIN SERPL-MCNC: 0.5 NG/ML
PROCALCITONIN SERPL-MCNC: 0.67 NG/ML
PROCALCITONIN SERPL-MCNC: 0.79 NG/ML
PROCALCITONIN SERPL-MCNC: 0.99 NG/ML
PROCALCITONIN SERPL-MCNC: 3.97 NG/ML
PROT SERPL-MCNC: 5.1 G/DL (ref 6.8–8.8)
PROT SERPL-MCNC: 5.1 G/DL (ref 6.8–8.8)
PROT SERPL-MCNC: 5.2 G/DL (ref 6.8–8.8)
PROT SERPL-MCNC: 5.6 G/DL (ref 6.8–8.8)
PROT SERPL-MCNC: 5.8 G/DL (ref 6.8–8.8)
PROT SERPL-MCNC: 6.1 G/DL (ref 6.8–8.8)
PROT SERPL-MCNC: 6.2 G/DL (ref 6.8–8.8)
PROT SERPL-MCNC: 6.6 G/DL (ref 6.8–8.8)
PROT SERPL-MCNC: 7.2 G/DL (ref 6.8–8.8)
RADIOLOGIST FLAGS: ABNORMAL
RBC # BLD AUTO: 2.89 10E12/L (ref 3.8–5.2)
RBC # BLD AUTO: 3.1 10E12/L (ref 3.8–5.2)
RBC # BLD AUTO: 3.25 10E12/L (ref 3.8–5.2)
RBC # BLD AUTO: 3.28 10E12/L (ref 3.8–5.2)
RBC # BLD AUTO: 3.33 10E12/L (ref 3.8–5.2)
RBC # BLD AUTO: 3.56 10E12/L (ref 3.8–5.2)
RBC # BLD AUTO: 3.64 10E12/L (ref 3.8–5.2)
RBC # BLD AUTO: 3.66 10E12/L (ref 3.8–5.2)
RBC # BLD AUTO: 3.68 10E12/L (ref 3.8–5.2)
RBC # BLD AUTO: 3.75 10E12/L (ref 3.8–5.2)
RBC # BLD AUTO: 3.8 10E12/L (ref 3.8–5.2)
RBC # BLD AUTO: 3.88 10E12/L (ref 3.8–5.2)
RBC # BLD AUTO: 3.89 10E12/L (ref 3.8–5.2)
RBC # BLD AUTO: 3.89 10E12/L (ref 3.8–5.2)
RBC # BLD AUTO: 3.93 10E12/L (ref 3.8–5.2)
RBC # BLD AUTO: 4.05 10E12/L (ref 3.8–5.2)
RBC # BLD AUTO: 4.13 10E12/L (ref 3.8–5.2)
RBC # BLD AUTO: 4.16 10E12/L (ref 3.8–5.2)
RBC # BLD AUTO: 4.2 10E12/L (ref 3.8–5.2)
RBC # BLD AUTO: 4.21 10E12/L (ref 3.8–5.2)
RBC # BLD AUTO: 4.24 10E12/L (ref 3.8–5.2)
RBC # BLD AUTO: 4.25 10E12/L (ref 3.8–5.2)
RBC # BLD AUTO: 4.34 10E12/L (ref 3.8–5.2)
RBC # BLD AUTO: 4.44 10E12/L (ref 3.8–5.2)
RBC # BLD AUTO: 4.49 10E12/L (ref 3.8–5.2)
RBC # BLD AUTO: 4.67 10E12/L (ref 3.8–5.2)
RBC #/AREA URNS AUTO: 16 /HPF (ref 0–2)
RBC #/AREA URNS AUTO: >182 /HPF (ref 0–2)
SAO2 % BLDV FROM PO2: 83 %
SAO2 % BLDV FROM PO2: 95 %
SODIUM BLD-SCNC: 138 MMOL/L (ref 133–144)
SODIUM SERPL-SCNC: 133 MMOL/L (ref 133–144)
SODIUM SERPL-SCNC: 134 MMOL/L (ref 133–144)
SODIUM SERPL-SCNC: 135 MMOL/L (ref 133–144)
SODIUM SERPL-SCNC: 138 MMOL/L (ref 133–144)
SODIUM SERPL-SCNC: 139 MMOL/L (ref 133–144)
SODIUM SERPL-SCNC: 140 MMOL/L (ref 133–144)
SODIUM SERPL-SCNC: 141 MMOL/L (ref 133–144)
SODIUM SERPL-SCNC: 142 MMOL/L (ref 133–144)
SODIUM SERPL-SCNC: 142 MMOL/L (ref 133–144)
SP GR UR STRIP: 1.01 (ref 1–1.03)
SP GR UR STRIP: 1.01 (ref 1–1.03)
SP GR UR STRIP: 1.02 (ref 1–1.03)
SPECIMEN EXP DATE BLD: NORMAL
SPECIMEN EXP DATE BLD: NORMAL
SPECIMEN SOURCE: ABNORMAL
SPECIMEN SOURCE: NORMAL
TRIGL SERPL-MCNC: 193 MG/DL
TROPONIN I BLD-MCNC: 0.01 UG/L (ref 0–0.1)
TROPONIN I SERPL-MCNC: 0.02 UG/L (ref 0–0.04)
TROPONIN I SERPL-MCNC: NORMAL UG/L (ref 0–0.04)
TROPONIN I SERPL-MCNC: NORMAL UG/L (ref 0–0.04)
UPPER GI ENDOSCOPY: NORMAL
URN SPEC COLLECT METH UR: ABNORMAL
URN SPEC COLLECT METH UR: ABNORMAL
URN SPEC COLLECT METH UR: NORMAL
UROBILINOGEN UR STRIP-MCNC: NORMAL MG/DL (ref 0–2)
WBC # BLD AUTO: 10 10E9/L (ref 4–11)
WBC # BLD AUTO: 11.1 10E9/L (ref 4–11)
WBC # BLD AUTO: 11.5 10E9/L (ref 4–11)
WBC # BLD AUTO: 11.7 10E9/L (ref 4–11)
WBC # BLD AUTO: 12 10E9/L (ref 4–11)
WBC # BLD AUTO: 12.3 10E9/L (ref 4–11)
WBC # BLD AUTO: 12.3 10E9/L (ref 4–11)
WBC # BLD AUTO: 13.2 10E9/L (ref 4–11)
WBC # BLD AUTO: 13.4 10E9/L (ref 4–11)
WBC # BLD AUTO: 14.4 10E9/L (ref 4–11)
WBC # BLD AUTO: 14.4 10E9/L (ref 4–11)
WBC # BLD AUTO: 14.5 10E9/L (ref 4–11)
WBC # BLD AUTO: 15 10E9/L (ref 4–11)
WBC # BLD AUTO: 15 10E9/L (ref 4–11)
WBC # BLD AUTO: 15.4 10E9/L (ref 4–11)
WBC # BLD AUTO: 15.5 10E9/L (ref 4–11)
WBC # BLD AUTO: 16.1 10E9/L (ref 4–11)
WBC # BLD AUTO: 16.4 10E9/L (ref 4–11)
WBC # BLD AUTO: 17.1 10E9/L (ref 4–11)
WBC # BLD AUTO: 17.4 10E9/L (ref 4–11)
WBC # BLD AUTO: 17.7 10E9/L (ref 4–11)
WBC # BLD AUTO: 18.7 10E9/L (ref 4–11)
WBC # BLD AUTO: 19.5 10E9/L (ref 4–11)
WBC # BLD AUTO: 21.6 10E9/L (ref 4–11)
WBC # BLD AUTO: 8.2 10E9/L (ref 4–11)
WBC # BLD AUTO: 8.3 10E9/L (ref 4–11)
WBC #/AREA URNS AUTO: 1 /HPF (ref 0–2)
WBC #/AREA URNS AUTO: 11 /HPF (ref 0–2)

## 2017-01-01 PROCEDURE — 84100 ASSAY OF PHOSPHORUS: CPT | Performed by: STUDENT IN AN ORGANIZED HEALTH CARE EDUCATION/TRAINING PROGRAM

## 2017-01-01 PROCEDURE — 86850 RBC ANTIBODY SCREEN: CPT | Performed by: EMERGENCY MEDICINE

## 2017-01-01 PROCEDURE — 94640 AIRWAY INHALATION TREATMENT: CPT | Mod: 76

## 2017-01-01 PROCEDURE — 81001 URINALYSIS AUTO W/SCOPE: CPT | Performed by: SURGERY

## 2017-01-01 PROCEDURE — 25000125 ZZHC RX 250: Performed by: SURGERY

## 2017-01-01 PROCEDURE — 84145 PROCALCITONIN (PCT): CPT | Performed by: SURGERY

## 2017-01-01 PROCEDURE — 00000146 ZZHCL STATISTIC GLUCOSE BY METER IP

## 2017-01-01 PROCEDURE — 36000053 ZZH SURGERY LEVEL 2 EA 15 ADDTL MIN - UMMC: Performed by: INTERNAL MEDICINE

## 2017-01-01 PROCEDURE — 25000132 ZZH RX MED GY IP 250 OP 250 PS 637: Mod: GY | Performed by: STUDENT IN AN ORGANIZED HEALTH CARE EDUCATION/TRAINING PROGRAM

## 2017-01-01 PROCEDURE — 97110 THERAPEUTIC EXERCISES: CPT | Mod: GP

## 2017-01-01 PROCEDURE — 97530 THERAPEUTIC ACTIVITIES: CPT | Mod: GP | Performed by: PHYSICAL THERAPIST

## 2017-01-01 PROCEDURE — A9270 NON-COVERED ITEM OR SERVICE: HCPCS | Mod: GY | Performed by: STUDENT IN AN ORGANIZED HEALTH CARE EDUCATION/TRAINING PROGRAM

## 2017-01-01 PROCEDURE — 40000275 ZZH STATISTIC RCP TIME EA 10 MIN

## 2017-01-01 PROCEDURE — 25000128 H RX IP 250 OP 636: Performed by: STUDENT IN AN ORGANIZED HEALTH CARE EDUCATION/TRAINING PROGRAM

## 2017-01-01 PROCEDURE — 85027 COMPLETE CBC AUTOMATED: CPT | Performed by: SURGERY

## 2017-01-01 PROCEDURE — 84134 ASSAY OF PREALBUMIN: CPT | Performed by: SURGERY

## 2017-01-01 PROCEDURE — 74177 CT ABD & PELVIS W/CONTRAST: CPT

## 2017-01-01 PROCEDURE — 40000170 ZZH STATISTIC PRE-PROCEDURE ASSESSMENT II: Performed by: INTERNAL MEDICINE

## 2017-01-01 PROCEDURE — 87186 SC STD MICRODIL/AGAR DIL: CPT | Performed by: SURGERY

## 2017-01-01 PROCEDURE — 80048 BASIC METABOLIC PNL TOTAL CA: CPT | Performed by: STUDENT IN AN ORGANIZED HEALTH CARE EDUCATION/TRAINING PROGRAM

## 2017-01-01 PROCEDURE — 87077 CULTURE AEROBIC IDENTIFY: CPT | Performed by: SURGERY

## 2017-01-01 PROCEDURE — 25000128 H RX IP 250 OP 636: Performed by: SURGERY

## 2017-01-01 PROCEDURE — 97110 THERAPEUTIC EXERCISES: CPT | Mod: GP | Performed by: PHYSICAL THERAPIST

## 2017-01-01 PROCEDURE — 94640 AIRWAY INHALATION TREATMENT: CPT

## 2017-01-01 PROCEDURE — 40000193 ZZH STATISTIC PT WARD VISIT: Performed by: PHYSICAL THERAPIST

## 2017-01-01 PROCEDURE — 36000057 ZZH SURGERY LEVEL 3 1ST 30 MIN - UMMC: Performed by: SURGERY

## 2017-01-01 PROCEDURE — P9047 ALBUMIN (HUMAN), 25%, 50ML: HCPCS | Performed by: SURGERY

## 2017-01-01 PROCEDURE — 93308 TTE F-UP OR LMTD: CPT

## 2017-01-01 PROCEDURE — 25000125 ZZHC RX 250: Performed by: STUDENT IN AN ORGANIZED HEALTH CARE EDUCATION/TRAINING PROGRAM

## 2017-01-01 PROCEDURE — 84100 ASSAY OF PHOSPHORUS: CPT | Performed by: SURGERY

## 2017-01-01 PROCEDURE — 40000133 ZZH STATISTIC OT WARD VISIT

## 2017-01-01 PROCEDURE — 83605 ASSAY OF LACTIC ACID: CPT | Performed by: SURGERY

## 2017-01-01 PROCEDURE — P9041 ALBUMIN (HUMAN),5%, 50ML: HCPCS | Performed by: SURGERY

## 2017-01-01 PROCEDURE — 83735 ASSAY OF MAGNESIUM: CPT | Performed by: SURGERY

## 2017-01-01 PROCEDURE — 93010 ELECTROCARDIOGRAM REPORT: CPT | Performed by: INTERNAL MEDICINE

## 2017-01-01 PROCEDURE — 97535 SELF CARE MNGMENT TRAINING: CPT | Mod: GO | Performed by: OCCUPATIONAL THERAPIST

## 2017-01-01 PROCEDURE — 40000170 ZZH STATISTIC PRE-PROCEDURE ASSESSMENT II: Performed by: SURGERY

## 2017-01-01 PROCEDURE — 85610 PROTHROMBIN TIME: CPT | Performed by: SURGERY

## 2017-01-01 PROCEDURE — 36592 COLLECT BLOOD FROM PICC: CPT | Performed by: STUDENT IN AN ORGANIZED HEALTH CARE EDUCATION/TRAINING PROGRAM

## 2017-01-01 PROCEDURE — 93321 DOPPLER ECHO F-UP/LMTD STD: CPT | Mod: 26 | Performed by: INTERNAL MEDICINE

## 2017-01-01 PROCEDURE — 97110 THERAPEUTIC EXERCISES: CPT | Mod: GO

## 2017-01-01 PROCEDURE — 88305 TISSUE EXAM BY PATHOLOGIST: CPT | Performed by: SURGERY

## 2017-01-01 PROCEDURE — 40000558 ZZH STATISTIC CVC DRESSING CHANGE

## 2017-01-01 PROCEDURE — 84484 ASSAY OF TROPONIN QUANT: CPT | Performed by: STUDENT IN AN ORGANIZED HEALTH CARE EDUCATION/TRAINING PROGRAM

## 2017-01-01 PROCEDURE — 83605 ASSAY OF LACTIC ACID: CPT | Performed by: STUDENT IN AN ORGANIZED HEALTH CARE EDUCATION/TRAINING PROGRAM

## 2017-01-01 PROCEDURE — 36415 COLL VENOUS BLD VENIPUNCTURE: CPT | Performed by: STUDENT IN AN ORGANIZED HEALTH CARE EDUCATION/TRAINING PROGRAM

## 2017-01-01 PROCEDURE — 40000133 ZZH STATISTIC OT WARD VISIT: Performed by: OCCUPATIONAL THERAPIST

## 2017-01-01 PROCEDURE — 85027 COMPLETE CBC AUTOMATED: CPT | Performed by: STUDENT IN AN ORGANIZED HEALTH CARE EDUCATION/TRAINING PROGRAM

## 2017-01-01 PROCEDURE — 25000125 ZZHC RX 250: Performed by: NURSE ANESTHETIST, CERTIFIED REGISTERED

## 2017-01-01 PROCEDURE — 25000125 ZZHC RX 250

## 2017-01-01 PROCEDURE — 87640 STAPH A DNA AMP PROBE: CPT | Performed by: SURGERY

## 2017-01-01 PROCEDURE — 36592 COLLECT BLOOD FROM PICC: CPT | Performed by: SURGERY

## 2017-01-01 PROCEDURE — 97116 GAIT TRAINING THERAPY: CPT | Mod: GP | Performed by: PHYSICAL THERAPIST

## 2017-01-01 PROCEDURE — 25000125 ZZHC RX 250: Performed by: PHYSICAL MEDICINE & REHABILITATION

## 2017-01-01 PROCEDURE — 12000008 ZZH R&B INTERMEDIATE UMMC

## 2017-01-01 PROCEDURE — 97535 SELF CARE MNGMENT TRAINING: CPT | Mod: GO

## 2017-01-01 PROCEDURE — 97530 THERAPEUTIC ACTIVITIES: CPT | Mod: GO | Performed by: OCCUPATIONAL THERAPIST

## 2017-01-01 PROCEDURE — 85049 AUTOMATED PLATELET COUNT: CPT | Performed by: STUDENT IN AN ORGANIZED HEALTH CARE EDUCATION/TRAINING PROGRAM

## 2017-01-01 PROCEDURE — 99214 OFFICE O/P EST MOD 30 MIN: CPT | Performed by: NURSE PRACTITIONER

## 2017-01-01 PROCEDURE — 97530 THERAPEUTIC ACTIVITIES: CPT | Mod: GO

## 2017-01-01 PROCEDURE — 71000014 ZZH RECOVERY PHASE 1 LEVEL 2 FIRST HR: Performed by: SURGERY

## 2017-01-01 PROCEDURE — 96361 HYDRATE IV INFUSION ADD-ON: CPT

## 2017-01-01 PROCEDURE — 87493 C DIFF AMPLIFIED PROBE: CPT | Performed by: STUDENT IN AN ORGANIZED HEALTH CARE EDUCATION/TRAINING PROGRAM

## 2017-01-01 PROCEDURE — 97110 THERAPEUTIC EXERCISES: CPT | Mod: GP | Performed by: REHABILITATION PRACTITIONER

## 2017-01-01 PROCEDURE — 36415 COLL VENOUS BLD VENIPUNCTURE: CPT

## 2017-01-01 PROCEDURE — 71020 XR CHEST 2 VW: CPT

## 2017-01-01 PROCEDURE — 86850 RBC ANTIBODY SCREEN: CPT | Performed by: SURGERY

## 2017-01-01 PROCEDURE — 12000006 ZZH R&B IMCU INTERMEDIATE UMMC

## 2017-01-01 PROCEDURE — 40000556 ZZH STATISTIC PERIPHERAL IV START W US GUIDANCE

## 2017-01-01 PROCEDURE — 84132 ASSAY OF SERUM POTASSIUM: CPT

## 2017-01-01 PROCEDURE — 97530 THERAPEUTIC ACTIVITIES: CPT | Mod: GP

## 2017-01-01 PROCEDURE — 40000802 ZZH SITE CHECK

## 2017-01-01 PROCEDURE — 25000128 H RX IP 250 OP 636: Performed by: INTERNAL MEDICINE

## 2017-01-01 PROCEDURE — 84145 PROCALCITONIN (PCT): CPT | Performed by: STUDENT IN AN ORGANIZED HEALTH CARE EDUCATION/TRAINING PROGRAM

## 2017-01-01 PROCEDURE — 27210436 ZZH NUTRITION PRODUCT SEMIELEM INTERMED CAN

## 2017-01-01 PROCEDURE — 99221 1ST HOSP IP/OBS SF/LOW 40: CPT | Mod: 25 | Performed by: INTERNAL MEDICINE

## 2017-01-01 PROCEDURE — A9270 NON-COVERED ITEM OR SERVICE: HCPCS | Mod: GY | Performed by: SURGERY

## 2017-01-01 PROCEDURE — 80076 HEPATIC FUNCTION PANEL: CPT | Performed by: STUDENT IN AN ORGANIZED HEALTH CARE EDUCATION/TRAINING PROGRAM

## 2017-01-01 PROCEDURE — C1769 GUIDE WIRE: HCPCS | Performed by: INTERNAL MEDICINE

## 2017-01-01 PROCEDURE — 93308 TTE F-UP OR LMTD: CPT | Mod: 26 | Performed by: INTERNAL MEDICINE

## 2017-01-01 PROCEDURE — 25000131 ZZH RX MED GY IP 250 OP 636 PS 637: Mod: GY | Performed by: STUDENT IN AN ORGANIZED HEALTH CARE EDUCATION/TRAINING PROGRAM

## 2017-01-01 PROCEDURE — 99233 SBSQ HOSP IP/OBS HIGH 50: CPT | Mod: GC | Performed by: SURGERY

## 2017-01-01 PROCEDURE — 84484 ASSAY OF TROPONIN QUANT: CPT

## 2017-01-01 PROCEDURE — 80053 COMPREHEN METABOLIC PANEL: CPT | Performed by: NURSE PRACTITIONER

## 2017-01-01 PROCEDURE — 87106 FUNGI IDENTIFICATION YEAST: CPT | Performed by: SURGERY

## 2017-01-01 PROCEDURE — 94799 UNLISTED PULMONARY SVC/PX: CPT

## 2017-01-01 PROCEDURE — 40000141 ZZH STATISTIC PERIPHERAL IV START W/O US GUIDANCE

## 2017-01-01 PROCEDURE — 85025 COMPLETE CBC W/AUTO DIFF WBC: CPT | Performed by: EMERGENCY MEDICINE

## 2017-01-01 PROCEDURE — 80048 BASIC METABOLIC PNL TOTAL CA: CPT | Performed by: SURGERY

## 2017-01-01 PROCEDURE — 25000128 H RX IP 250 OP 636: Performed by: NURSE ANESTHETIST, CERTIFIED REGISTERED

## 2017-01-01 PROCEDURE — P9041 ALBUMIN (HUMAN),5%, 50ML: HCPCS | Performed by: NURSE ANESTHETIST, CERTIFIED REGISTERED

## 2017-01-01 PROCEDURE — 93005 ELECTROCARDIOGRAM TRACING: CPT

## 2017-01-01 PROCEDURE — 83735 ASSAY OF MAGNESIUM: CPT | Performed by: STUDENT IN AN ORGANIZED HEALTH CARE EDUCATION/TRAINING PROGRAM

## 2017-01-01 PROCEDURE — 87040 BLOOD CULTURE FOR BACTERIA: CPT | Performed by: SURGERY

## 2017-01-01 PROCEDURE — 81001 URINALYSIS AUTO W/SCOPE: CPT | Performed by: STUDENT IN AN ORGANIZED HEALTH CARE EDUCATION/TRAINING PROGRAM

## 2017-01-01 PROCEDURE — 71010 XR CHEST PORT 1 VW: CPT

## 2017-01-01 PROCEDURE — 80053 COMPREHEN METABOLIC PANEL: CPT | Performed by: STUDENT IN AN ORGANIZED HEALTH CARE EDUCATION/TRAINING PROGRAM

## 2017-01-01 PROCEDURE — 25800025 ZZH RX 258: Performed by: SURGERY

## 2017-01-01 PROCEDURE — C9399 UNCLASSIFIED DRUGS OR BIOLOG: HCPCS | Performed by: STUDENT IN AN ORGANIZED HEALTH CARE EDUCATION/TRAINING PROGRAM

## 2017-01-01 PROCEDURE — 27210432 ZZH NUTRITION PRODUCT RENAL BASIC LITER

## 2017-01-01 PROCEDURE — 82330 ASSAY OF CALCIUM: CPT

## 2017-01-01 PROCEDURE — 84295 ASSAY OF SERUM SODIUM: CPT

## 2017-01-01 PROCEDURE — 0DHA8UZ INSERTION OF FEEDING DEVICE INTO JEJUNUM, VIA NATURAL OR ARTIFICIAL OPENING ENDOSCOPIC: ICD-10-PCS | Performed by: INTERNAL MEDICINE

## 2017-01-01 PROCEDURE — 37000009 ZZH ANESTHESIA TECHNICAL FEE, EACH ADDTL 15 MIN: Performed by: INTERNAL MEDICINE

## 2017-01-01 PROCEDURE — 80053 COMPREHEN METABOLIC PANEL: CPT | Performed by: SURGERY

## 2017-01-01 PROCEDURE — 71000015 ZZH RECOVERY PHASE 1 LEVEL 2 EA ADDTL HR: Performed by: SURGERY

## 2017-01-01 PROCEDURE — 83690 ASSAY OF LIPASE: CPT | Performed by: EMERGENCY MEDICINE

## 2017-01-01 PROCEDURE — 85025 COMPLETE CBC W/AUTO DIFF WBC: CPT | Performed by: NURSE PRACTITIONER

## 2017-01-01 PROCEDURE — 97530 THERAPEUTIC ACTIVITIES: CPT | Mod: GP | Performed by: REHABILITATION PRACTITIONER

## 2017-01-01 PROCEDURE — 97165 OT EVAL LOW COMPLEX 30 MIN: CPT | Mod: GO | Performed by: OCCUPATIONAL THERAPIST

## 2017-01-01 PROCEDURE — 27210794 ZZH OR GENERAL SUPPLY STERILE: Performed by: SURGERY

## 2017-01-01 PROCEDURE — 40000193 ZZH STATISTIC PT WARD VISIT

## 2017-01-01 PROCEDURE — S0020 INJECTION, BUPIVICAINE HYDRO: HCPCS | Performed by: SURGERY

## 2017-01-01 PROCEDURE — 86900 BLOOD TYPING SEROLOGIC ABO: CPT | Performed by: SURGERY

## 2017-01-01 PROCEDURE — 82150 ASSAY OF AMYLASE: CPT | Performed by: STUDENT IN AN ORGANIZED HEALTH CARE EDUCATION/TRAINING PROGRAM

## 2017-01-01 PROCEDURE — 81003 URINALYSIS AUTO W/O SCOPE: CPT | Performed by: STUDENT IN AN ORGANIZED HEALTH CARE EDUCATION/TRAINING PROGRAM

## 2017-01-01 PROCEDURE — 86900 BLOOD TYPING SEROLOGIC ABO: CPT | Performed by: EMERGENCY MEDICINE

## 2017-01-01 PROCEDURE — 83605 ASSAY OF LACTIC ACID: CPT

## 2017-01-01 PROCEDURE — 74176 CT ABD & PELVIS W/O CONTRAST: CPT

## 2017-01-01 PROCEDURE — 84132 ASSAY OF SERUM POTASSIUM: CPT | Performed by: STUDENT IN AN ORGANIZED HEALTH CARE EDUCATION/TRAINING PROGRAM

## 2017-01-01 PROCEDURE — 20000004 ZZH R&B ICU UMMC

## 2017-01-01 PROCEDURE — 25000125 ZZHC RX 250: Performed by: INTERNAL MEDICINE

## 2017-01-01 PROCEDURE — 25000566 ZZH SEVOFLURANE, EA 15 MIN: Performed by: SURGERY

## 2017-01-01 PROCEDURE — 87641 MR-STAPH DNA AMP PROBE: CPT | Performed by: SURGERY

## 2017-01-01 PROCEDURE — 86901 BLOOD TYPING SEROLOGIC RH(D): CPT | Performed by: EMERGENCY MEDICINE

## 2017-01-01 PROCEDURE — A9270 NON-COVERED ITEM OR SERVICE: HCPCS | Performed by: INTERNAL MEDICINE

## 2017-01-01 PROCEDURE — 37000008 ZZH ANESTHESIA TECHNICAL FEE, 1ST 30 MIN: Performed by: SURGERY

## 2017-01-01 PROCEDURE — 25000128 H RX IP 250 OP 636: Performed by: EMERGENCY MEDICINE

## 2017-01-01 PROCEDURE — 80053 COMPREHEN METABOLIC PANEL: CPT | Performed by: EMERGENCY MEDICINE

## 2017-01-01 PROCEDURE — 84132 ASSAY OF SERUM POTASSIUM: CPT | Performed by: SURGERY

## 2017-01-01 PROCEDURE — 25000125 ZZHC RX 250: Performed by: ANESTHESIOLOGY

## 2017-01-01 PROCEDURE — 36000059 ZZH SURGERY LEVEL 3 EA 15 ADDTL MIN UMMC: Performed by: SURGERY

## 2017-01-01 PROCEDURE — 37000008 ZZH ANESTHESIA TECHNICAL FEE, 1ST 30 MIN: Performed by: INTERNAL MEDICINE

## 2017-01-01 PROCEDURE — 74000 XR ABDOMEN PORT F1 VW: CPT

## 2017-01-01 PROCEDURE — 80061 LIPID PANEL: CPT | Performed by: NURSE PRACTITIONER

## 2017-01-01 PROCEDURE — C1769 GUIDE WIRE: HCPCS

## 2017-01-01 PROCEDURE — 36415 COLL VENOUS BLD VENIPUNCTURE: CPT | Performed by: NURSE PRACTITIONER

## 2017-01-01 PROCEDURE — 83690 ASSAY OF LIPASE: CPT | Performed by: STUDENT IN AN ORGANIZED HEALTH CARE EDUCATION/TRAINING PROGRAM

## 2017-01-01 PROCEDURE — P9047 ALBUMIN (HUMAN), 25%, 50ML: HCPCS | Performed by: STUDENT IN AN ORGANIZED HEALTH CARE EDUCATION/TRAINING PROGRAM

## 2017-01-01 PROCEDURE — 96375 TX/PRO/DX INJ NEW DRUG ADDON: CPT

## 2017-01-01 PROCEDURE — 40000274 ZZH STATISTIC RCP CONSULT EA 30 MIN

## 2017-01-01 PROCEDURE — 25000128 H RX IP 250 OP 636: Performed by: NURSE PRACTITIONER

## 2017-01-01 PROCEDURE — 25800025 ZZH RX 258

## 2017-01-01 PROCEDURE — 85610 PROTHROMBIN TIME: CPT | Mod: QW

## 2017-01-01 PROCEDURE — 40000281 ZZH STATISTIC TRANSPORT TIME EA 15 MIN

## 2017-01-01 PROCEDURE — 0DJ08ZZ INSPECTION OF UPPER INTESTINAL TRACT, VIA NATURAL OR ARTIFICIAL OPENING ENDOSCOPIC: ICD-10-PCS | Performed by: SURGERY

## 2017-01-01 PROCEDURE — 86901 BLOOD TYPING SEROLOGIC RH(D): CPT | Performed by: SURGERY

## 2017-01-01 PROCEDURE — 25800025 ZZH RX 258: Performed by: STUDENT IN AN ORGANIZED HEALTH CARE EDUCATION/TRAINING PROGRAM

## 2017-01-01 PROCEDURE — 25000132 ZZH RX MED GY IP 250 OP 250 PS 637: Mod: GY | Performed by: SURGERY

## 2017-01-01 PROCEDURE — 25000128 H RX IP 250 OP 636: Performed by: RADIOLOGY

## 2017-01-01 PROCEDURE — 99207 ZZC NO BILLABLE SERVICE THIS VISIT: CPT | Performed by: SURGERY

## 2017-01-01 PROCEDURE — 82947 ASSAY GLUCOSE BLOOD QUANT: CPT

## 2017-01-01 PROCEDURE — 25000128 H RX IP 250 OP 636

## 2017-01-01 PROCEDURE — 25000125 ZZHC RX 250: Performed by: EMERGENCY MEDICINE

## 2017-01-01 PROCEDURE — 27210913 ZZH NUTRITION PRODUCT INTERMEDIATE PACKET

## 2017-01-01 PROCEDURE — 0DU947Z SUPPLEMENT DUODENUM WITH AUTOLOGOUS TISSUE SUBSTITUTE, PERCUTANEOUS ENDOSCOPIC APPROACH: ICD-10-PCS | Performed by: SURGERY

## 2017-01-01 PROCEDURE — 40000986 XR CHEST PORT 1 VW

## 2017-01-01 PROCEDURE — 40000193 ZZH STATISTIC PT WARD VISIT: Performed by: REHABILITATION PRACTITIONER

## 2017-01-01 PROCEDURE — 0DJ08ZZ INSPECTION OF UPPER INTESTINAL TRACT, VIA NATURAL OR ARTIFICIAL OPENING ENDOSCOPIC: ICD-10-PCS | Performed by: INTERNAL MEDICINE

## 2017-01-01 PROCEDURE — 27210195 ZZH KIT POWER PICC DOUBLE LUMEN

## 2017-01-01 PROCEDURE — 36415 COLL VENOUS BLD VENIPUNCTURE: CPT | Performed by: SURGERY

## 2017-01-01 PROCEDURE — 87070 CULTURE OTHR SPECIMN AEROBIC: CPT | Performed by: SURGERY

## 2017-01-01 PROCEDURE — 82330 ASSAY OF CALCIUM: CPT | Performed by: STUDENT IN AN ORGANIZED HEALTH CARE EDUCATION/TRAINING PROGRAM

## 2017-01-01 PROCEDURE — 36569 INSJ PICC 5 YR+ W/O IMAGING: CPT

## 2017-01-01 PROCEDURE — 93306 TTE W/DOPPLER COMPLETE: CPT | Mod: 26 | Performed by: INTERNAL MEDICINE

## 2017-01-01 PROCEDURE — 93325 DOPPLER ECHO COLOR FLOW MAPG: CPT | Mod: 26 | Performed by: INTERNAL MEDICINE

## 2017-01-01 PROCEDURE — 96365 THER/PROPH/DIAG IV INF INIT: CPT

## 2017-01-01 PROCEDURE — 99285 EMERGENCY DEPT VISIT HI MDM: CPT | Mod: 25

## 2017-01-01 PROCEDURE — 85610 PROTHROMBIN TIME: CPT | Performed by: STUDENT IN AN ORGANIZED HEALTH CARE EDUCATION/TRAINING PROGRAM

## 2017-01-01 PROCEDURE — 40000809 ZZH STATISTIC NO DOCUMENTATION TO SUPPORT CHARGE

## 2017-01-01 PROCEDURE — 87086 URINE CULTURE/COLONY COUNT: CPT | Performed by: SURGERY

## 2017-01-01 PROCEDURE — 83880 ASSAY OF NATRIURETIC PEPTIDE: CPT | Performed by: STUDENT IN AN ORGANIZED HEALTH CARE EDUCATION/TRAINING PROGRAM

## 2017-01-01 PROCEDURE — 84075 ASSAY ALKALINE PHOSPHATASE: CPT | Performed by: SURGERY

## 2017-01-01 PROCEDURE — 27210794 ZZH OR GENERAL SUPPLY STERILE: Performed by: INTERNAL MEDICINE

## 2017-01-01 PROCEDURE — 87205 SMEAR GRAM STAIN: CPT | Performed by: SURGERY

## 2017-01-01 PROCEDURE — 37000009 ZZH ANESTHESIA TECHNICAL FEE, EACH ADDTL 15 MIN: Performed by: SURGERY

## 2017-01-01 PROCEDURE — 99285 EMERGENCY DEPT VISIT HI MDM: CPT | Mod: Z6 | Performed by: EMERGENCY MEDICINE

## 2017-01-01 PROCEDURE — 85014 HEMATOCRIT: CPT

## 2017-01-01 PROCEDURE — 40000276 ZZH STATISTIC RCP TIME ED VENT EA 10 MIN

## 2017-01-01 PROCEDURE — 36000055 ZZH SURGERY LEVEL 2 W FLUORO 1ST 30 MIN - UMMC: Performed by: INTERNAL MEDICINE

## 2017-01-01 PROCEDURE — 82803 BLOOD GASES ANY COMBINATION: CPT

## 2017-01-01 PROCEDURE — 99213 OFFICE O/P EST LOW 20 MIN: CPT | Performed by: NURSE PRACTITIONER

## 2017-01-01 PROCEDURE — 74020 XR ABDOMEN 2 VW: CPT

## 2017-01-01 PROCEDURE — 85610 PROTHROMBIN TIME: CPT | Performed by: EMERGENCY MEDICINE

## 2017-01-01 PROCEDURE — 97162 PT EVAL MOD COMPLEX 30 MIN: CPT | Mod: GP

## 2017-01-01 PROCEDURE — 93306 TTE W/DOPPLER COMPLETE: CPT

## 2017-01-01 PROCEDURE — 71000014 ZZH RECOVERY PHASE 1 LEVEL 2 FIRST HR: Performed by: INTERNAL MEDICINE

## 2017-01-01 PROCEDURE — 40000279 XR SURGERY CARM FLUORO GREATER THAN 5 MIN W STILLS: Mod: TC

## 2017-01-01 RX ORDER — HEPARIN SODIUM 5000 [USP'U]/.5ML
5000 INJECTION, SOLUTION INTRAVENOUS; SUBCUTANEOUS EVERY 12 HOURS
Status: DISCONTINUED | OUTPATIENT
Start: 2017-01-01 | End: 2017-01-01

## 2017-01-01 RX ORDER — ACETYLCYSTEINE 200 MG/ML
2 SOLUTION ORAL; RESPIRATORY (INHALATION) 4 TIMES DAILY
Status: DISCONTINUED | OUTPATIENT
Start: 2017-01-01 | End: 2017-01-01

## 2017-01-01 RX ORDER — FUROSEMIDE 10 MG/ML
10 INJECTION INTRAMUSCULAR; INTRAVENOUS ONCE
Status: COMPLETED | OUTPATIENT
Start: 2017-01-01 | End: 2017-01-01

## 2017-01-01 RX ORDER — FENTANYL CITRATE 50 UG/ML
25-50 INJECTION, SOLUTION INTRAMUSCULAR; INTRAVENOUS
Status: DISCONTINUED | OUTPATIENT
Start: 2017-01-01 | End: 2017-01-01 | Stop reason: HOSPADM

## 2017-01-01 RX ORDER — HALOPERIDOL 5 MG/ML
.5-1 INJECTION INTRAMUSCULAR
Status: DISCONTINUED | OUTPATIENT
Start: 2017-01-01 | End: 2017-01-01 | Stop reason: HOSPADM

## 2017-01-01 RX ORDER — HYDROMORPHONE HCL/0.9% NACL/PF 0.2MG/0.2
.1-.2 SYRINGE (ML) INTRAVENOUS
Status: DISCONTINUED | OUTPATIENT
Start: 2017-01-01 | End: 2017-01-01

## 2017-01-01 RX ORDER — POTASSIUM CHLORIDE 29.8 MG/ML
20 INJECTION INTRAVENOUS
Status: DISCONTINUED | OUTPATIENT
Start: 2017-01-01 | End: 2017-01-01

## 2017-01-01 RX ORDER — HYDROCHLOROTHIAZIDE 25 MG/1
12.5 TABLET ORAL DAILY
Qty: 45 TABLET | Refills: 1 | Status: ON HOLD | OUTPATIENT
Start: 2017-01-01 | End: 2017-01-01

## 2017-01-01 RX ORDER — SODIUM CHLORIDE AND POTASSIUM CHLORIDE 150; 450 MG/100ML; MG/100ML
INJECTION, SOLUTION INTRAVENOUS CONTINUOUS
Status: DISCONTINUED | OUTPATIENT
Start: 2017-01-01 | End: 2017-01-01

## 2017-01-01 RX ORDER — IOPAMIDOL 755 MG/ML
100 INJECTION, SOLUTION INTRAVASCULAR ONCE
Status: COMPLETED | OUTPATIENT
Start: 2017-01-01 | End: 2017-01-01

## 2017-01-01 RX ORDER — NICOTINE POLACRILEX 4 MG
15-30 LOZENGE BUCCAL
Status: DISCONTINUED | OUTPATIENT
Start: 2017-01-01 | End: 2017-01-01

## 2017-01-01 RX ORDER — IPRATROPIUM BROMIDE AND ALBUTEROL SULFATE 2.5; .5 MG/3ML; MG/3ML
3 SOLUTION RESPIRATORY (INHALATION) EVERY 4 HOURS
Status: DISCONTINUED | OUTPATIENT
Start: 2017-01-01 | End: 2017-01-01

## 2017-01-01 RX ORDER — ONDANSETRON 4 MG/1
4 TABLET, ORALLY DISINTEGRATING ORAL EVERY 30 MIN PRN
Status: DISCONTINUED | OUTPATIENT
Start: 2017-01-01 | End: 2017-01-01 | Stop reason: HOSPADM

## 2017-01-01 RX ORDER — POTASSIUM CL/LIDO/0.9 % NACL 10MEQ/0.1L
10 INTRAVENOUS SOLUTION, PIGGYBACK (ML) INTRAVENOUS
Status: DISCONTINUED | OUTPATIENT
Start: 2017-01-01 | End: 2017-01-01

## 2017-01-01 RX ORDER — BUPIVACAINE HYDROCHLORIDE 2.5 MG/ML
INJECTION, SOLUTION INFILTRATION; PERINEURAL PRN
Status: DISCONTINUED | OUTPATIENT
Start: 2017-01-01 | End: 2017-01-01 | Stop reason: HOSPADM

## 2017-01-01 RX ORDER — ALBUTEROL SULFATE 0.83 MG/ML
2.5 SOLUTION RESPIRATORY (INHALATION) 3 TIMES DAILY
Status: DISCONTINUED | OUTPATIENT
Start: 2017-01-01 | End: 2017-01-01

## 2017-01-01 RX ORDER — ALBUMIN, HUMAN INJ 5% 5 %
SOLUTION INTRAVENOUS CONTINUOUS PRN
Status: DISCONTINUED | OUTPATIENT
Start: 2017-01-01 | End: 2017-01-01

## 2017-01-01 RX ORDER — SODIUM CHLORIDE, SODIUM LACTATE, POTASSIUM CHLORIDE, CALCIUM CHLORIDE 600; 310; 30; 20 MG/100ML; MG/100ML; MG/100ML; MG/100ML
INJECTION, SOLUTION INTRAVENOUS CONTINUOUS
Status: DISCONTINUED | OUTPATIENT
Start: 2017-01-01 | End: 2017-01-01

## 2017-01-01 RX ORDER — METOPROLOL TARTRATE 1 MG/ML
INJECTION, SOLUTION INTRAVENOUS
Status: DISCONTINUED
Start: 2017-01-01 | End: 2017-01-01 | Stop reason: HOSPADM

## 2017-01-01 RX ORDER — FENTANYL CITRATE 50 UG/ML
INJECTION, SOLUTION INTRAMUSCULAR; INTRAVENOUS PRN
Status: DISCONTINUED | OUTPATIENT
Start: 2017-01-01 | End: 2017-01-01

## 2017-01-01 RX ORDER — ONDANSETRON 2 MG/ML
4 INJECTION INTRAMUSCULAR; INTRAVENOUS EVERY 6 HOURS PRN
Status: DISCONTINUED | OUTPATIENT
Start: 2017-01-01 | End: 2017-01-01 | Stop reason: HOSPADM

## 2017-01-01 RX ORDER — CEFAZOLIN SODIUM 2 G/100ML
2 INJECTION, SOLUTION INTRAVENOUS
Status: COMPLETED | OUTPATIENT
Start: 2017-01-01 | End: 2017-01-01

## 2017-01-01 RX ORDER — BISACODYL 10 MG
10 SUPPOSITORY, RECTAL RECTAL ONCE
Status: COMPLETED | OUTPATIENT
Start: 2017-01-01 | End: 2017-01-01

## 2017-01-01 RX ORDER — NALOXONE HYDROCHLORIDE 0.4 MG/ML
.1-.4 INJECTION, SOLUTION INTRAMUSCULAR; INTRAVENOUS; SUBCUTANEOUS
Status: DISCONTINUED | OUTPATIENT
Start: 2017-01-01 | End: 2017-01-01

## 2017-01-01 RX ORDER — HYDRALAZINE HYDROCHLORIDE 20 MG/ML
5 INJECTION INTRAMUSCULAR; INTRAVENOUS EVERY 4 HOURS PRN
Status: DISCONTINUED | OUTPATIENT
Start: 2017-01-01 | End: 2017-01-01

## 2017-01-01 RX ORDER — ONDANSETRON 4 MG/1
4 TABLET, ORALLY DISINTEGRATING ORAL EVERY 6 HOURS PRN
Status: DISCONTINUED | OUTPATIENT
Start: 2017-01-01 | End: 2017-01-01 | Stop reason: HOSPADM

## 2017-01-01 RX ORDER — LIDOCAINE HYDROCHLORIDE 20 MG/ML
INJECTION, SOLUTION INFILTRATION; PERINEURAL PRN
Status: DISCONTINUED | OUTPATIENT
Start: 2017-01-01 | End: 2017-01-01

## 2017-01-01 RX ORDER — FLUMAZENIL 0.1 MG/ML
0.2 INJECTION, SOLUTION INTRAVENOUS
Status: DISCONTINUED | OUTPATIENT
Start: 2017-01-01 | End: 2017-01-01

## 2017-01-01 RX ORDER — ERTAPENEM 1 G/1
1 INJECTION, POWDER, LYOPHILIZED, FOR SOLUTION INTRAMUSCULAR; INTRAVENOUS EVERY 24 HOURS
Status: DISCONTINUED | OUTPATIENT
Start: 2017-01-01 | End: 2017-01-01

## 2017-01-01 RX ORDER — METOPROLOL TARTRATE 1 MG/ML
5 INJECTION, SOLUTION INTRAVENOUS EVERY 6 HOURS
Status: DISCONTINUED | OUTPATIENT
Start: 2017-01-01 | End: 2017-01-01

## 2017-01-01 RX ORDER — PROCHLORPERAZINE MALEATE 5 MG
5 TABLET ORAL EVERY 6 HOURS PRN
Status: DISCONTINUED | OUTPATIENT
Start: 2017-01-01 | End: 2017-01-01 | Stop reason: HOSPADM

## 2017-01-01 RX ORDER — POTASSIUM CHLORIDE 1.5 G/1.58G
20-40 POWDER, FOR SOLUTION ORAL
Status: DISCONTINUED | OUTPATIENT
Start: 2017-01-01 | End: 2017-01-01

## 2017-01-01 RX ORDER — ONDANSETRON 2 MG/ML
4 INJECTION INTRAMUSCULAR; INTRAVENOUS ONCE
Status: COMPLETED | OUTPATIENT
Start: 2017-01-01 | End: 2017-01-01

## 2017-01-01 RX ORDER — MINERAL OIL/HYDROPHIL PETROLAT
OINTMENT (GRAM) TOPICAL EVERY 8 HOURS PRN
Status: DISCONTINUED | OUTPATIENT
Start: 2017-01-01 | End: 2017-01-01 | Stop reason: HOSPADM

## 2017-01-01 RX ORDER — ACETYLCYSTEINE 200 MG/ML
2 SOLUTION ORAL; RESPIRATORY (INHALATION) EVERY 4 HOURS PRN
Status: DISCONTINUED | OUTPATIENT
Start: 2017-01-01 | End: 2017-01-01

## 2017-01-01 RX ORDER — ONDANSETRON 2 MG/ML
INJECTION INTRAMUSCULAR; INTRAVENOUS PRN
Status: DISCONTINUED | OUTPATIENT
Start: 2017-01-01 | End: 2017-01-01

## 2017-01-01 RX ORDER — IOPAMIDOL 755 MG/ML
86 INJECTION, SOLUTION INTRAVASCULAR ONCE
Status: COMPLETED | OUTPATIENT
Start: 2017-01-01 | End: 2017-01-01

## 2017-01-01 RX ORDER — POTASSIUM CHLORIDE 7.45 MG/ML
10 INJECTION INTRAVENOUS
Status: DISCONTINUED | OUTPATIENT
Start: 2017-01-01 | End: 2017-01-01

## 2017-01-01 RX ORDER — POTASSIUM CHLORIDE 750 MG/1
20-40 TABLET, EXTENDED RELEASE ORAL
Status: DISCONTINUED | OUTPATIENT
Start: 2017-01-01 | End: 2017-01-01

## 2017-01-01 RX ORDER — MORPHINE SULFATE 2 MG/ML
1-2 INJECTION, SOLUTION INTRAMUSCULAR; INTRAVENOUS
Status: DISCONTINUED | OUTPATIENT
Start: 2017-01-01 | End: 2017-01-01 | Stop reason: DRUGHIGH

## 2017-01-01 RX ORDER — PROPOFOL 10 MG/ML
INJECTION, EMULSION INTRAVENOUS CONTINUOUS PRN
Status: DISCONTINUED | OUTPATIENT
Start: 2017-01-01 | End: 2017-01-01

## 2017-01-01 RX ORDER — CEFAZOLIN SODIUM 1 G/3ML
1 INJECTION, POWDER, FOR SOLUTION INTRAMUSCULAR; INTRAVENOUS SEE ADMIN INSTRUCTIONS
Status: DISCONTINUED | OUTPATIENT
Start: 2017-01-01 | End: 2017-01-01 | Stop reason: HOSPADM

## 2017-01-01 RX ORDER — FUROSEMIDE 10 MG/ML
20 INJECTION INTRAMUSCULAR; INTRAVENOUS ONCE
Status: COMPLETED | OUTPATIENT
Start: 2017-01-01 | End: 2017-01-01

## 2017-01-01 RX ORDER — ACETYLCYSTEINE 200 MG/ML
2 SOLUTION ORAL; RESPIRATORY (INHALATION) 3 TIMES DAILY
Status: DISCONTINUED | OUTPATIENT
Start: 2017-01-01 | End: 2017-01-01

## 2017-01-01 RX ORDER — HEPARIN SODIUM,PORCINE 10 UNIT/ML
2-5 VIAL (ML) INTRAVENOUS
Status: COMPLETED | OUTPATIENT
Start: 2017-01-01 | End: 2017-01-01

## 2017-01-01 RX ORDER — LIDOCAINE 40 MG/G
CREAM TOPICAL
Status: DISCONTINUED | OUTPATIENT
Start: 2017-01-01 | End: 2017-01-01

## 2017-01-01 RX ORDER — ALBUMIN (HUMAN) 12.5 G/50ML
25 SOLUTION INTRAVENOUS ONCE
Status: COMPLETED | OUTPATIENT
Start: 2017-01-01 | End: 2017-01-01

## 2017-01-01 RX ORDER — ONDANSETRON 2 MG/ML
4 INJECTION INTRAMUSCULAR; INTRAVENOUS EVERY 6 HOURS PRN
Status: DISCONTINUED | OUTPATIENT
Start: 2017-01-01 | End: 2017-01-01

## 2017-01-01 RX ORDER — IPRATROPIUM BROMIDE AND ALBUTEROL SULFATE 2.5; .5 MG/3ML; MG/3ML
3 SOLUTION RESPIRATORY (INHALATION) 4 TIMES DAILY
Status: DISCONTINUED | OUTPATIENT
Start: 2017-01-01 | End: 2017-01-01

## 2017-01-01 RX ORDER — ONDANSETRON 2 MG/ML
4 INJECTION INTRAMUSCULAR; INTRAVENOUS EVERY 30 MIN PRN
Status: DISCONTINUED | OUTPATIENT
Start: 2017-01-01 | End: 2017-01-01 | Stop reason: HOSPADM

## 2017-01-01 RX ORDER — AMOXICILLIN 250 MG
1 CAPSULE ORAL 2 TIMES DAILY PRN
COMMUNITY

## 2017-01-01 RX ORDER — FENTANYL CITRATE 50 UG/ML
25-50 INJECTION, SOLUTION INTRAMUSCULAR; INTRAVENOUS
Status: CANCELLED | OUTPATIENT
Start: 2017-01-01

## 2017-01-01 RX ORDER — EPHEDRINE SULFATE 50 MG/ML
INJECTION, SOLUTION INTRAMUSCULAR; INTRAVENOUS; SUBCUTANEOUS PRN
Status: DISCONTINUED | OUTPATIENT
Start: 2017-01-01 | End: 2017-01-01

## 2017-01-01 RX ORDER — ALBUTEROL SULFATE 0.83 MG/ML
SOLUTION RESPIRATORY (INHALATION)
Status: COMPLETED
Start: 2017-01-01 | End: 2017-01-01

## 2017-01-01 RX ORDER — IPRATROPIUM BROMIDE AND ALBUTEROL SULFATE 2.5; .5 MG/3ML; MG/3ML
3 SOLUTION RESPIRATORY (INHALATION)
Status: DISCONTINUED | OUTPATIENT
Start: 2017-01-01 | End: 2017-01-01

## 2017-01-01 RX ORDER — MORPHINE SULFATE 2 MG/ML
2-4 INJECTION, SOLUTION INTRAMUSCULAR; INTRAVENOUS
Status: DISCONTINUED | OUTPATIENT
Start: 2017-01-01 | End: 2017-01-01 | Stop reason: HOSPADM

## 2017-01-01 RX ORDER — MAGNESIUM SULFATE HEPTAHYDRATE 40 MG/ML
4 INJECTION, SOLUTION INTRAVENOUS EVERY 4 HOURS PRN
Status: DISCONTINUED | OUTPATIENT
Start: 2017-01-01 | End: 2017-01-01

## 2017-01-01 RX ORDER — HEPARIN SODIUM 5000 [USP'U]/.5ML
5000 INJECTION, SOLUTION INTRAVENOUS; SUBCUTANEOUS EVERY 8 HOURS
Status: DISCONTINUED | OUTPATIENT
Start: 2017-01-01 | End: 2017-01-01

## 2017-01-01 RX ORDER — HYDROMORPHONE HCL/0.9% NACL/PF 0.2MG/0.2
.2-.4 SYRINGE (ML) INTRAVENOUS EVERY 4 HOURS PRN
Status: DISCONTINUED | OUTPATIENT
Start: 2017-01-01 | End: 2017-01-01 | Stop reason: HOSPADM

## 2017-01-01 RX ORDER — MORPHINE SULFATE 2 MG/ML
1-10 INJECTION, SOLUTION INTRAMUSCULAR; INTRAVENOUS
Status: DISCONTINUED | OUTPATIENT
Start: 2017-01-01 | End: 2017-01-01

## 2017-01-01 RX ORDER — ONDANSETRON 4 MG/1
4 TABLET, ORALLY DISINTEGRATING ORAL EVERY 30 MIN PRN
Status: CANCELLED | OUTPATIENT
Start: 2017-01-01

## 2017-01-01 RX ORDER — OLANZAPINE 5 MG/1
5 TABLET, ORALLY DISINTEGRATING ORAL AT BEDTIME
Status: DISCONTINUED | OUTPATIENT
Start: 2017-01-01 | End: 2017-01-01 | Stop reason: HOSPADM

## 2017-01-01 RX ORDER — PIPERACILLIN SODIUM, TAZOBACTAM SODIUM 3; .375 G/15ML; G/15ML
3.38 INJECTION, POWDER, LYOPHILIZED, FOR SOLUTION INTRAVENOUS ONCE
Status: COMPLETED | OUTPATIENT
Start: 2017-01-01 | End: 2017-01-01

## 2017-01-01 RX ORDER — DEXTROSE MONOHYDRATE 25 G/50ML
25-50 INJECTION, SOLUTION INTRAVENOUS
Status: DISCONTINUED | OUTPATIENT
Start: 2017-01-01 | End: 2017-01-01

## 2017-01-01 RX ORDER — FUROSEMIDE 10 MG/ML
INJECTION INTRAMUSCULAR; INTRAVENOUS
Status: COMPLETED
Start: 2017-01-01 | End: 2017-01-01

## 2017-01-01 RX ORDER — ACETYLCYSTEINE 200 MG/ML
2 SOLUTION ORAL; RESPIRATORY (INHALATION) EVERY MORNING
Status: DISCONTINUED | OUTPATIENT
Start: 2017-01-01 | End: 2017-01-01

## 2017-01-01 RX ORDER — SODIUM CHLORIDE, SODIUM LACTATE, POTASSIUM CHLORIDE, CALCIUM CHLORIDE 600; 310; 30; 20 MG/100ML; MG/100ML; MG/100ML; MG/100ML
INJECTION, SOLUTION INTRAVENOUS CONTINUOUS PRN
Status: DISCONTINUED | OUTPATIENT
Start: 2017-01-01 | End: 2017-01-01

## 2017-01-01 RX ORDER — DEXAMETHASONE SODIUM PHOSPHATE 4 MG/ML
INJECTION, SOLUTION INTRA-ARTICULAR; INTRALESIONAL; INTRAMUSCULAR; INTRAVENOUS; SOFT TISSUE PRN
Status: DISCONTINUED | OUTPATIENT
Start: 2017-01-01 | End: 2017-01-01

## 2017-01-01 RX ORDER — ACETAMINOPHEN 650 MG/1
650 SUPPOSITORY RECTAL EVERY 8 HOURS PRN
Status: DISCONTINUED | OUTPATIENT
Start: 2017-01-01 | End: 2017-01-01 | Stop reason: HOSPADM

## 2017-01-01 RX ORDER — ONDANSETRON 2 MG/ML
4 INJECTION INTRAMUSCULAR; INTRAVENOUS EVERY 30 MIN PRN
Status: CANCELLED | OUTPATIENT
Start: 2017-01-01

## 2017-01-01 RX ORDER — LIDOCAINE 40 MG/G
CREAM TOPICAL
Status: DISCONTINUED | OUTPATIENT
Start: 2017-01-01 | End: 2017-01-01 | Stop reason: HOSPADM

## 2017-01-01 RX ORDER — DEXTROSE MONOHYDRATE, SODIUM CHLORIDE, AND POTASSIUM CHLORIDE 50; 1.49; 4.5 G/1000ML; G/1000ML; G/1000ML
INJECTION, SOLUTION INTRAVENOUS
Status: COMPLETED
Start: 2017-01-01 | End: 2017-01-01

## 2017-01-01 RX ORDER — ALBUMIN (HUMAN) 12.5 G/50ML
12.5 SOLUTION INTRAVENOUS ONCE
Status: COMPLETED | OUTPATIENT
Start: 2017-01-01 | End: 2017-01-01

## 2017-01-01 RX ORDER — ESMOLOL HYDROCHLORIDE 10 MG/ML
INJECTION INTRAVENOUS PRN
Status: DISCONTINUED | OUTPATIENT
Start: 2017-01-01 | End: 2017-01-01

## 2017-01-01 RX ORDER — METOPROLOL TARTRATE 1 MG/ML
2.5 INJECTION, SOLUTION INTRAVENOUS EVERY 6 HOURS
Status: DISCONTINUED | OUTPATIENT
Start: 2017-01-01 | End: 2017-01-01

## 2017-01-01 RX ORDER — HEPARIN SODIUM 5000 [USP'U]/.5ML
5000 INJECTION, SOLUTION INTRAVENOUS; SUBCUTANEOUS 2 TIMES DAILY
Status: DISCONTINUED | OUTPATIENT
Start: 2017-01-01 | End: 2017-01-01

## 2017-01-01 RX ORDER — ONDANSETRON 4 MG/1
4 TABLET, ORALLY DISINTEGRATING ORAL EVERY 6 HOURS PRN
Status: DISCONTINUED | OUTPATIENT
Start: 2017-01-01 | End: 2017-01-01

## 2017-01-01 RX ORDER — CIPROFLOXACIN 2 MG/ML
400 INJECTION, SOLUTION INTRAVENOUS EVERY 24 HOURS
Status: DISCONTINUED | OUTPATIENT
Start: 2017-01-01 | End: 2017-01-01

## 2017-01-01 RX ORDER — METOPROLOL TARTRATE 1 MG/ML
1-2 INJECTION, SOLUTION INTRAVENOUS EVERY 5 MIN PRN
Status: CANCELLED | OUTPATIENT
Start: 2017-01-01

## 2017-01-01 RX ORDER — IPRATROPIUM BROMIDE AND ALBUTEROL SULFATE 2.5; .5 MG/3ML; MG/3ML
3 SOLUTION RESPIRATORY (INHALATION) EVERY 4 HOURS PRN
Status: DISCONTINUED | OUTPATIENT
Start: 2017-01-01 | End: 2017-01-01

## 2017-01-01 RX ORDER — METOPROLOL TARTRATE 1 MG/ML
5 INJECTION, SOLUTION INTRAVENOUS EVERY 5 MIN PRN
Status: DISCONTINUED | OUTPATIENT
Start: 2017-01-01 | End: 2017-01-01

## 2017-01-01 RX ORDER — SODIUM CHLORIDE 9 MG/ML
INJECTION, SOLUTION INTRAVENOUS
Status: DISCONTINUED
Start: 2017-01-01 | End: 2017-01-01 | Stop reason: HOSPADM

## 2017-01-01 RX ORDER — ALBUTEROL SULFATE 0.83 MG/ML
2.5 SOLUTION RESPIRATORY (INHALATION) EVERY 4 HOURS PRN
Status: DISCONTINUED | OUTPATIENT
Start: 2017-01-01 | End: 2017-01-01

## 2017-01-01 RX ORDER — SODIUM CHLORIDE, SODIUM LACTATE, POTASSIUM CHLORIDE, CALCIUM CHLORIDE 600; 310; 30; 20 MG/100ML; MG/100ML; MG/100ML; MG/100ML
INJECTION, SOLUTION INTRAVENOUS CONTINUOUS
Status: CANCELLED | OUTPATIENT
Start: 2017-01-01

## 2017-01-01 RX ORDER — HYDROMORPHONE HCL/0.9% NACL/PF 0.2MG/0.2
.2-.3 SYRINGE (ML) INTRAVENOUS
Status: DISCONTINUED | OUTPATIENT
Start: 2017-01-01 | End: 2017-01-01

## 2017-01-01 RX ORDER — HYDROMORPHONE HCL/0.9% NACL/PF 0.2MG/0.2
0.2 SYRINGE (ML) INTRAVENOUS ONCE
Status: COMPLETED | OUTPATIENT
Start: 2017-01-01 | End: 2017-01-01

## 2017-01-01 RX ORDER — FUROSEMIDE 10 MG/ML
10 INJECTION INTRAMUSCULAR; INTRAVENOUS
Status: DISCONTINUED | OUTPATIENT
Start: 2017-01-01 | End: 2017-01-01

## 2017-01-01 RX ORDER — NALOXONE HYDROCHLORIDE 0.4 MG/ML
.1-.4 INJECTION, SOLUTION INTRAMUSCULAR; INTRAVENOUS; SUBCUTANEOUS
Status: DISCONTINUED | OUTPATIENT
Start: 2017-01-01 | End: 2017-01-01 | Stop reason: HOSPADM

## 2017-01-01 RX ORDER — ATROPINE SULFATE 10 MG/ML
1-2 SOLUTION/ DROPS OPHTHALMIC
Status: DISCONTINUED | OUTPATIENT
Start: 2017-01-01 | End: 2017-01-01 | Stop reason: HOSPADM

## 2017-01-01 RX ORDER — DEXTROSE MONOHYDRATE, SODIUM CHLORIDE, AND POTASSIUM CHLORIDE 50; 1.49; 4.5 G/1000ML; G/1000ML; G/1000ML
INJECTION, SOLUTION INTRAVENOUS CONTINUOUS
Status: DISCONTINUED | OUTPATIENT
Start: 2017-01-01 | End: 2017-01-01

## 2017-01-01 RX ORDER — PROPOFOL 10 MG/ML
INJECTION, EMULSION INTRAVENOUS PRN
Status: DISCONTINUED | OUTPATIENT
Start: 2017-01-01 | End: 2017-01-01

## 2017-01-01 RX ORDER — LIDOCAINE HYDROCHLORIDE 10 MG/ML
INJECTION, SOLUTION INFILTRATION; PERINEURAL PRN
Status: DISCONTINUED | OUTPATIENT
Start: 2017-01-01 | End: 2017-01-01 | Stop reason: HOSPADM

## 2017-01-01 RX ORDER — KETOROLAC TROMETHAMINE 15 MG/ML
15 INJECTION, SOLUTION INTRAMUSCULAR; INTRAVENOUS EVERY 6 HOURS PRN
Status: DISPENSED | OUTPATIENT
Start: 2017-01-01 | End: 2017-01-01

## 2017-01-01 RX ORDER — LORAZEPAM 2 MG/ML
INJECTION INTRAMUSCULAR
Status: COMPLETED
Start: 2017-01-01 | End: 2017-01-01

## 2017-01-01 RX ORDER — POTASSIUM CL/LIDO/0.9 % NACL 10MEQ/0.1L
10 INTRAVENOUS SOLUTION, PIGGYBACK (ML) INTRAVENOUS ONCE
Status: COMPLETED | OUTPATIENT
Start: 2017-01-01 | End: 2017-01-01

## 2017-01-01 RX ORDER — HYDROCHLOROTHIAZIDE 25 MG/1
TABLET ORAL
Qty: 45 TABLET | Refills: 0 | Status: SHIPPED | OUTPATIENT
Start: 2017-01-01

## 2017-01-01 RX ORDER — HEPARIN SODIUM,PORCINE 10 UNIT/ML
5-10 VIAL (ML) INTRAVENOUS EVERY 24 HOURS
Status: DISCONTINUED | OUTPATIENT
Start: 2017-01-01 | End: 2017-01-01

## 2017-01-01 RX ORDER — IOPAMIDOL 755 MG/ML
85 INJECTION, SOLUTION INTRAVASCULAR ONCE
Status: COMPLETED | OUTPATIENT
Start: 2017-01-01 | End: 2017-01-01

## 2017-01-01 RX ORDER — ACETYLCYSTEINE 200 MG/ML
2 SOLUTION ORAL; RESPIRATORY (INHALATION) EVERY 4 HOURS
Status: DISCONTINUED | OUTPATIENT
Start: 2017-01-01 | End: 2017-01-01

## 2017-01-01 RX ORDER — HEPARIN SODIUM,PORCINE 10 UNIT/ML
5-10 VIAL (ML) INTRAVENOUS
Status: DISCONTINUED | OUTPATIENT
Start: 2017-01-01 | End: 2017-01-01 | Stop reason: HOSPADM

## 2017-01-01 RX ORDER — HYDROMORPHONE HCL/0.9% NACL/PF 0.2MG/0.2
0.2 SYRINGE (ML) INTRAVENOUS EVERY 4 HOURS PRN
Status: DISCONTINUED | OUTPATIENT
Start: 2017-01-01 | End: 2017-01-01

## 2017-01-01 RX ORDER — SALIVA STIMULANT COMB. NO.3
2 SPRAY, NON-AEROSOL (ML) MUCOUS MEMBRANE
Status: DISCONTINUED | OUTPATIENT
Start: 2017-01-01 | End: 2017-01-01 | Stop reason: HOSPADM

## 2017-01-01 RX ORDER — PROCHLORPERAZINE 25 MG
12.5 SUPPOSITORY, RECTAL RECTAL EVERY 12 HOURS PRN
Status: DISCONTINUED | OUTPATIENT
Start: 2017-01-01 | End: 2017-01-01 | Stop reason: HOSPADM

## 2017-01-01 RX ORDER — ACETYLCYSTEINE 200 MG/ML
2 SOLUTION ORAL; RESPIRATORY (INHALATION) EVERY 6 HOURS
Status: DISCONTINUED | OUTPATIENT
Start: 2017-01-01 | End: 2017-01-01

## 2017-01-01 RX ORDER — LABETALOL HYDROCHLORIDE 5 MG/ML
INJECTION, SOLUTION INTRAVENOUS PRN
Status: DISCONTINUED | OUTPATIENT
Start: 2017-01-01 | End: 2017-01-01

## 2017-01-01 RX ORDER — ALBUTEROL SULFATE 0.83 MG/ML
2.5 SOLUTION RESPIRATORY (INHALATION) EVERY MORNING
Status: DISCONTINUED | OUTPATIENT
Start: 2017-01-01 | End: 2017-01-01 | Stop reason: HOSPADM

## 2017-01-01 RX ORDER — ALBUMIN, HUMAN INJ 5% 5 %
12.5 SOLUTION INTRAVENOUS ONCE
Status: COMPLETED | OUTPATIENT
Start: 2017-01-01 | End: 2017-01-01

## 2017-01-01 RX ORDER — MORPHINE SULFATE 2 MG/ML
2 INJECTION, SOLUTION INTRAMUSCULAR; INTRAVENOUS
Status: DISCONTINUED | OUTPATIENT
Start: 2017-01-01 | End: 2017-01-01

## 2017-01-01 RX ORDER — LIDOCAINE 50 MG/G
1 PATCH TOPICAL
Status: DISCONTINUED | OUTPATIENT
Start: 2017-01-01 | End: 2017-01-01 | Stop reason: HOSPADM

## 2017-01-01 RX ORDER — LORAZEPAM 2 MG/ML
.5-1 INJECTION INTRAMUSCULAR
Status: DISCONTINUED | OUTPATIENT
Start: 2017-01-01 | End: 2017-01-01 | Stop reason: HOSPADM

## 2017-01-01 RX ADMIN — IPRATROPIUM BROMIDE AND ALBUTEROL SULFATE 3 ML: .5; 3 SOLUTION RESPIRATORY (INHALATION) at 08:36

## 2017-01-01 RX ADMIN — FENTANYL CITRATE 25 MCG: 50 INJECTION, SOLUTION INTRAMUSCULAR; INTRAVENOUS at 11:28

## 2017-01-01 RX ADMIN — POTASSIUM CHLORIDE 10 MEQ: 14.9 INJECTION, SOLUTION, CONCENTRATE PARENTERAL at 00:04

## 2017-01-01 RX ADMIN — IOPAMIDOL 65 ML: 755 INJECTION, SOLUTION INTRAVENOUS at 02:17

## 2017-01-01 RX ADMIN — METOPROLOL TARTRATE 5 MG: 5 INJECTION, SOLUTION INTRAVENOUS at 21:08

## 2017-01-01 RX ADMIN — METOPROLOL TARTRATE 5 MG: 5 INJECTION, SOLUTION INTRAVENOUS at 00:35

## 2017-01-01 RX ADMIN — DEXTROSE 0.5 MG/MIN: 5 SOLUTION INTRAVENOUS at 04:45

## 2017-01-01 RX ADMIN — PANTOPRAZOLE SODIUM 40 MG: 40 INJECTION, POWDER, FOR SOLUTION INTRAVENOUS at 10:29

## 2017-01-01 RX ADMIN — INSULIN ASPART 1 UNITS: 100 INJECTION, SOLUTION INTRAVENOUS; SUBCUTANEOUS at 00:25

## 2017-01-01 RX ADMIN — SODIUM CHLORIDE, PRESERVATIVE FREE 5 ML: 5 INJECTION INTRAVENOUS at 06:46

## 2017-01-01 RX ADMIN — HEPARIN SODIUM 5000 UNITS: 5000 INJECTION, SOLUTION INTRAVENOUS; SUBCUTANEOUS at 06:12

## 2017-01-01 RX ADMIN — DEXTROSE 0.5 MG/MIN: 5 SOLUTION INTRAVENOUS at 14:09

## 2017-01-01 RX ADMIN — HYDROMORPHONE HYDROCHLORIDE 0.2 MG: 10 INJECTION, SOLUTION INTRAMUSCULAR; INTRAVENOUS; SUBCUTANEOUS at 11:24

## 2017-01-01 RX ADMIN — KETOROLAC TROMETHAMINE 15 MG: 15 INJECTION, SOLUTION INTRAMUSCULAR; INTRAVENOUS at 22:13

## 2017-01-01 RX ADMIN — LIDOCAINE 1 PATCH: 50 PATCH CUTANEOUS at 19:27

## 2017-01-01 RX ADMIN — METOPROLOL TARTRATE 5 MG: 5 INJECTION, SOLUTION INTRAVENOUS at 08:58

## 2017-01-01 RX ADMIN — METOPROLOL TARTRATE 5 MG: 5 INJECTION, SOLUTION INTRAVENOUS at 18:31

## 2017-01-01 RX ADMIN — PANTOPRAZOLE SODIUM 40 MG: 40 INJECTION, POWDER, FOR SOLUTION INTRAVENOUS at 08:05

## 2017-01-01 RX ADMIN — HEPARIN SODIUM 5000 UNITS: 5000 INJECTION, SOLUTION INTRAVENOUS; SUBCUTANEOUS at 16:53

## 2017-01-01 RX ADMIN — ACETYLCYSTEINE: 200 SOLUTION ORAL; RESPIRATORY (INHALATION) at 08:24

## 2017-01-01 RX ADMIN — SODIUM CHLORIDE, PRESERVATIVE FREE 5 ML: 5 INJECTION INTRAVENOUS at 08:53

## 2017-01-01 RX ADMIN — MORPHINE SULFATE 2 MG: 2 INJECTION, SOLUTION INTRAMUSCULAR; INTRAVENOUS at 11:00

## 2017-01-01 RX ADMIN — INSULIN ASPART 1 UNITS: 100 INJECTION, SOLUTION INTRAVENOUS; SUBCUTANEOUS at 01:28

## 2017-01-01 RX ADMIN — SODIUM PHOSPHATE, MONOBASIC, MONOHYDRATE AND SODIUM PHOSPHATE, DIBASIC, ANHYDROUS 15 MMOL: 276; 142 INJECTION, SOLUTION INTRAVENOUS at 15:33

## 2017-01-01 RX ADMIN — INSULIN ASPART 1 UNITS: 100 INJECTION, SOLUTION INTRAVENOUS; SUBCUTANEOUS at 19:31

## 2017-01-01 RX ADMIN — I.V. FAT EMULSION 250 ML: 20 EMULSION INTRAVENOUS at 21:01

## 2017-01-01 RX ADMIN — INSULIN ASPART 1 UNITS: 100 INJECTION, SOLUTION INTRAVENOUS; SUBCUTANEOUS at 04:05

## 2017-01-01 RX ADMIN — ACETYLCYSTEINE 2 ML: 200 SOLUTION ORAL; RESPIRATORY (INHALATION) at 20:32

## 2017-01-01 RX ADMIN — I.V. FAT EMULSION 250 ML: 20 EMULSION INTRAVENOUS at 20:47

## 2017-01-01 RX ADMIN — HEPARIN SODIUM 5000 UNITS: 5000 INJECTION, SOLUTION INTRAVENOUS; SUBCUTANEOUS at 10:07

## 2017-01-01 RX ADMIN — METOPROLOL TARTRATE 5 MG: 5 INJECTION INTRAVENOUS at 16:22

## 2017-01-01 RX ADMIN — Medication 2 G: at 06:41

## 2017-01-01 RX ADMIN — KETOROLAC TROMETHAMINE 15 MG: 15 INJECTION, SOLUTION INTRAMUSCULAR; INTRAVENOUS at 21:33

## 2017-01-01 RX ADMIN — METRONIDAZOLE 500 MG: 500 INJECTION, SOLUTION INTRAVENOUS at 04:05

## 2017-01-01 RX ADMIN — METRONIDAZOLE 500 MG: 500 INJECTION, SOLUTION INTRAVENOUS at 12:56

## 2017-01-01 RX ADMIN — POTASSIUM CHLORIDE: 2 INJECTION, SOLUTION, CONCENTRATE INTRAVENOUS at 19:53

## 2017-01-01 RX ADMIN — ATROPINE SULFATE 2 DROP: 10 SOLUTION/ DROPS OPHTHALMIC at 10:52

## 2017-01-01 RX ADMIN — ALBUTEROL SULFATE 2.5 MG: 2.5 SOLUTION RESPIRATORY (INHALATION) at 09:33

## 2017-01-01 RX ADMIN — INSULIN ASPART 1 UNITS: 100 INJECTION, SOLUTION INTRAVENOUS; SUBCUTANEOUS at 08:36

## 2017-01-01 RX ADMIN — PANTOPRAZOLE SODIUM 40 MG: 40 INJECTION, POWDER, FOR SOLUTION INTRAVENOUS at 07:57

## 2017-01-01 RX ADMIN — ACETYLCYSTEINE 2 ML: 200 SOLUTION ORAL; RESPIRATORY (INHALATION) at 20:27

## 2017-01-01 RX ADMIN — SODIUM CHLORIDE, PRESERVATIVE FREE 5 ML: 5 INJECTION INTRAVENOUS at 13:43

## 2017-01-01 RX ADMIN — IPRATROPIUM BROMIDE AND ALBUTEROL SULFATE 3 ML: .5; 3 SOLUTION RESPIRATORY (INHALATION) at 21:22

## 2017-01-01 RX ADMIN — ROCURONIUM BROMIDE 10 MG: 10 INJECTION INTRAVENOUS at 06:55

## 2017-01-01 RX ADMIN — IOPAMIDOL 85 ML: 755 INJECTION, SOLUTION INTRAVENOUS at 14:04

## 2017-01-01 RX ADMIN — ACETYLCYSTEINE 2 ML: 200 SOLUTION ORAL; RESPIRATORY (INHALATION) at 10:19

## 2017-01-01 RX ADMIN — Medication 0.3 MG: at 12:02

## 2017-01-01 RX ADMIN — HEPARIN SODIUM 5000 UNITS: 5000 INJECTION, SOLUTION INTRAVENOUS; SUBCUTANEOUS at 05:39

## 2017-01-01 RX ADMIN — FENTANYL CITRATE 50 MCG: 50 INJECTION, SOLUTION INTRAMUSCULAR; INTRAVENOUS at 06:56

## 2017-01-01 RX ADMIN — SUGAMMADEX 120 MG: 100 INJECTION, SOLUTION INTRAVENOUS at 10:21

## 2017-01-01 RX ADMIN — ACETYLCYSTEINE 2 ML: 200 SOLUTION ORAL; RESPIRATORY (INHALATION) at 08:05

## 2017-01-01 RX ADMIN — CIPROFLOXACIN 400 MG: 2 INJECTION, SOLUTION INTRAVENOUS at 13:34

## 2017-01-01 RX ADMIN — ACETYLCYSTEINE 2 ML: 200 SOLUTION ORAL; RESPIRATORY (INHALATION) at 08:12

## 2017-01-01 RX ADMIN — INSULIN ASPART 1 UNITS: 100 INJECTION, SOLUTION INTRAVENOUS; SUBCUTANEOUS at 08:46

## 2017-01-01 RX ADMIN — PANTOPRAZOLE SODIUM 40 MG: 40 INJECTION, POWDER, FOR SOLUTION INTRAVENOUS at 08:15

## 2017-01-01 RX ADMIN — Medication 2 G: at 06:46

## 2017-01-01 RX ADMIN — POTASSIUM CHLORIDE, DEXTROSE MONOHYDRATE AND SODIUM CHLORIDE: 150; 5; 450 INJECTION, SOLUTION INTRAVENOUS at 01:25

## 2017-01-01 RX ADMIN — SODIUM CHLORIDE, PRESERVATIVE FREE 5 ML: 5 INJECTION INTRAVENOUS at 14:28

## 2017-01-01 RX ADMIN — METOPROLOL TARTRATE 5 MG: 5 INJECTION, SOLUTION INTRAVENOUS at 06:12

## 2017-01-01 RX ADMIN — POTASSIUM CHLORIDE, DEXTROSE MONOHYDRATE AND SODIUM CHLORIDE 100 ML/HR: 150; 5; 450 INJECTION, SOLUTION INTRAVENOUS at 17:12

## 2017-01-01 RX ADMIN — PANTOPRAZOLE SODIUM 40 MG: 40 INJECTION, POWDER, FOR SOLUTION INTRAVENOUS at 08:33

## 2017-01-01 RX ADMIN — METOPROLOL TARTRATE 5 MG: 5 INJECTION, SOLUTION INTRAVENOUS at 23:41

## 2017-01-01 RX ADMIN — METRONIDAZOLE 500 MG: 500 INJECTION, SOLUTION INTRAVENOUS at 12:17

## 2017-01-01 RX ADMIN — METOPROLOL TARTRATE 5 MG: 5 INJECTION, SOLUTION INTRAVENOUS at 14:55

## 2017-01-01 RX ADMIN — METOPROLOL TARTRATE 5 MG: 5 INJECTION, SOLUTION INTRAVENOUS at 11:40

## 2017-01-01 RX ADMIN — PROCHLORPERAZINE EDISYLATE 5 MG: 5 INJECTION INTRAMUSCULAR; INTRAVENOUS at 07:41

## 2017-01-01 RX ADMIN — ACETYLCYSTEINE 2 ML: 200 SOLUTION ORAL; RESPIRATORY (INHALATION) at 09:03

## 2017-01-01 RX ADMIN — ACETAMINOPHEN 650 MG: 650 SUPPOSITORY RECTAL at 07:57

## 2017-01-01 RX ADMIN — AMIODARONE HYDROCHLORIDE 0.5 MG/MIN: 50 INJECTION, SOLUTION INTRAVENOUS at 05:16

## 2017-01-01 RX ADMIN — CALCIUM GLUCONATE: 94 INJECTION, SOLUTION INTRAVENOUS at 20:09

## 2017-01-01 RX ADMIN — LIDOCAINE HYDROCHLORIDE 40 MG: 20 INJECTION, SOLUTION INFILTRATION; PERINEURAL at 17:41

## 2017-01-01 RX ADMIN — Medication 0.2 MG: at 13:31

## 2017-01-01 RX ADMIN — DEXTROSE 0.5 MG/MIN: 5 SOLUTION INTRAVENOUS at 08:01

## 2017-01-01 RX ADMIN — ERTAPENEM SODIUM 1 G: 1 INJECTION, POWDER, LYOPHILIZED, FOR SOLUTION INTRAMUSCULAR; INTRAVENOUS at 12:44

## 2017-01-01 RX ADMIN — ACETYLCYSTEINE 2 ML: 200 SOLUTION ORAL; RESPIRATORY (INHALATION) at 19:39

## 2017-01-01 RX ADMIN — ALBUMIN (HUMAN): 12.5 SOLUTION INTRAVENOUS at 06:16

## 2017-01-01 RX ADMIN — OLANZAPINE 5 MG: 5 TABLET, ORALLY DISINTEGRATING ORAL at 22:16

## 2017-01-01 RX ADMIN — FUROSEMIDE 10 MG: 10 INJECTION, SOLUTION INTRAVENOUS at 21:21

## 2017-01-01 RX ADMIN — METOPROLOL TARTRATE 5 MG: 5 INJECTION INTRAVENOUS at 13:45

## 2017-01-01 RX ADMIN — METOPROLOL TARTRATE 5 MG: 5 INJECTION, SOLUTION INTRAVENOUS at 00:06

## 2017-01-01 RX ADMIN — FENTANYL CITRATE 50 MCG: 50 INJECTION, SOLUTION INTRAMUSCULAR; INTRAVENOUS at 17:41

## 2017-01-01 RX ADMIN — PANTOPRAZOLE SODIUM 40 MG: 40 INJECTION, POWDER, FOR SOLUTION INTRAVENOUS at 07:54

## 2017-01-01 RX ADMIN — LIDOCAINE 1 PATCH: 50 PATCH CUTANEOUS at 19:51

## 2017-01-01 RX ADMIN — POTASSIUM CHLORIDE 10 MEQ: 14.9 INJECTION, SOLUTION, CONCENTRATE PARENTERAL at 06:28

## 2017-01-01 RX ADMIN — PANTOPRAZOLE SODIUM 40 MG: 40 INJECTION, POWDER, FOR SOLUTION INTRAVENOUS at 07:47

## 2017-01-01 RX ADMIN — POTASSIUM CHLORIDE 10 MEQ: 14.9 INJECTION, SOLUTION, CONCENTRATE PARENTERAL at 10:58

## 2017-01-01 RX ADMIN — METRONIDAZOLE 500 MG: 500 INJECTION, SOLUTION INTRAVENOUS at 13:10

## 2017-01-01 RX ADMIN — LORAZEPAM 1 MG: 2 INJECTION INTRAMUSCULAR; INTRAVENOUS at 14:45

## 2017-01-01 RX ADMIN — HEPARIN SODIUM 5000 UNITS: 5000 INJECTION, SOLUTION INTRAVENOUS; SUBCUTANEOUS at 20:50

## 2017-01-01 RX ADMIN — DEXTROSE 0.5 MG/MIN: 5 SOLUTION INTRAVENOUS at 23:04

## 2017-01-01 RX ADMIN — INSULIN ASPART 1 UNITS: 100 INJECTION, SOLUTION INTRAVENOUS; SUBCUTANEOUS at 07:56

## 2017-01-01 RX ADMIN — ALBUMIN (HUMAN) 25 G: 12.5 SOLUTION INTRAVENOUS at 21:54

## 2017-01-01 RX ADMIN — POTASSIUM CHLORIDE: 2 INJECTION, SOLUTION, CONCENTRATE INTRAVENOUS at 19:27

## 2017-01-01 RX ADMIN — IOPAMIDOL 86 ML: 755 INJECTION, SOLUTION INTRAVENOUS at 12:42

## 2017-01-01 RX ADMIN — I.V. FAT EMULSION 250 ML: 20 EMULSION INTRAVENOUS at 19:53

## 2017-01-01 RX ADMIN — LIDOCAINE 1 PATCH: 50 PATCH CUTANEOUS at 19:24

## 2017-01-01 RX ADMIN — INSULIN ASPART 1 UNITS: 100 INJECTION, SOLUTION INTRAVENOUS; SUBCUTANEOUS at 04:35

## 2017-01-01 RX ADMIN — ACETYLCYSTEINE 2 ML: 200 SOLUTION ORAL; RESPIRATORY (INHALATION) at 19:38

## 2017-01-01 RX ADMIN — SODIUM CHLORIDE, PRESERVATIVE FREE 5 ML: 5 INJECTION INTRAVENOUS at 13:05

## 2017-01-01 RX ADMIN — FENTANYL CITRATE 100 MCG: 50 INJECTION, SOLUTION INTRAMUSCULAR; INTRAVENOUS at 06:08

## 2017-01-01 RX ADMIN — ALBUTEROL SULFATE: 2.5 SOLUTION RESPIRATORY (INHALATION) at 12:34

## 2017-01-01 RX ADMIN — INSULIN ASPART 1 UNITS: 100 INJECTION, SOLUTION INTRAVENOUS; SUBCUTANEOUS at 04:18

## 2017-01-01 RX ADMIN — INSULIN ASPART 1 UNITS: 100 INJECTION, SOLUTION INTRAVENOUS; SUBCUTANEOUS at 17:49

## 2017-01-01 RX ADMIN — NALOXONE HYDROCHLORIDE 0.4 MG: 0.4 INJECTION, SOLUTION INTRAMUSCULAR; INTRAVENOUS; SUBCUTANEOUS at 18:17

## 2017-01-01 RX ADMIN — LIDOCAINE HYDROCHLORIDE 3.5 ML: 10 INJECTION, SOLUTION INFILTRATION; PERINEURAL at 08:39

## 2017-01-01 RX ADMIN — HYDRALAZINE HYDROCHLORIDE 5 MG: 20 INJECTION INTRAMUSCULAR; INTRAVENOUS at 14:41

## 2017-01-01 RX ADMIN — HYDRALAZINE HYDROCHLORIDE 5 MG: 20 INJECTION INTRAMUSCULAR; INTRAVENOUS at 04:24

## 2017-01-01 RX ADMIN — PROPOFOL 20 MCG/KG/MIN: 10 INJECTION, EMULSION INTRAVENOUS at 17:48

## 2017-01-01 RX ADMIN — I.V. FAT EMULSION 250 ML: 20 EMULSION INTRAVENOUS at 19:50

## 2017-01-01 RX ADMIN — PANTOPRAZOLE SODIUM 40 MG: 40 INJECTION, POWDER, FOR SOLUTION INTRAVENOUS at 08:27

## 2017-01-01 RX ADMIN — OLANZAPINE 5 MG: 5 TABLET, ORALLY DISINTEGRATING ORAL at 22:45

## 2017-01-01 RX ADMIN — Medication 0.2 MG: at 09:14

## 2017-01-01 RX ADMIN — ACETYLCYSTEINE 2 ML: 200 SOLUTION ORAL; RESPIRATORY (INHALATION) at 09:35

## 2017-01-01 RX ADMIN — ERTAPENEM SODIUM 1 G: 1 INJECTION, POWDER, LYOPHILIZED, FOR SOLUTION INTRAMUSCULAR; INTRAVENOUS at 11:01

## 2017-01-01 RX ADMIN — ERTAPENEM SODIUM 1 G: 1 INJECTION, POWDER, LYOPHILIZED, FOR SOLUTION INTRAMUSCULAR; INTRAVENOUS at 11:39

## 2017-01-01 RX ADMIN — POTASSIUM CHLORIDE: 2 INJECTION, SOLUTION, CONCENTRATE INTRAVENOUS at 20:09

## 2017-01-01 RX ADMIN — ONDANSETRON 4 MG: 2 INJECTION INTRAMUSCULAR; INTRAVENOUS at 10:15

## 2017-01-01 RX ADMIN — LIDOCAINE 1 PATCH: 50 PATCH CUTANEOUS at 20:43

## 2017-01-01 RX ADMIN — POTASSIUM CHLORIDE: 2 INJECTION, SOLUTION, CONCENTRATE INTRAVENOUS at 20:49

## 2017-01-01 RX ADMIN — CALCIUM GLUCONATE: 94 INJECTION, SOLUTION INTRAVENOUS at 19:58

## 2017-01-01 RX ADMIN — Medication 400 MG: at 10:15

## 2017-01-01 RX ADMIN — POTASSIUM CHLORIDE: 2 INJECTION, SOLUTION, CONCENTRATE INTRAVENOUS at 20:03

## 2017-01-01 RX ADMIN — PANTOPRAZOLE SODIUM 40 MG: 40 INJECTION, POWDER, FOR SOLUTION INTRAVENOUS at 08:12

## 2017-01-01 RX ADMIN — IPRATROPIUM BROMIDE AND ALBUTEROL SULFATE 3 ML: .5; 3 SOLUTION RESPIRATORY (INHALATION) at 12:12

## 2017-01-01 RX ADMIN — ACETYLCYSTEINE 2 ML: 200 SOLUTION ORAL; RESPIRATORY (INHALATION) at 16:22

## 2017-01-01 RX ADMIN — ERTAPENEM SODIUM 1 G: 1 INJECTION, POWDER, LYOPHILIZED, FOR SOLUTION INTRAMUSCULAR; INTRAVENOUS at 12:53

## 2017-01-01 RX ADMIN — ALBUMIN (HUMAN) 12.5 G: 12.5 SOLUTION INTRAVENOUS at 14:44

## 2017-01-01 RX ADMIN — POTASSIUM CHLORIDE 10 MEQ: 14.9 INJECTION, SOLUTION, CONCENTRATE PARENTERAL at 08:51

## 2017-01-01 RX ADMIN — Medication 0.2 MG: at 03:05

## 2017-01-01 RX ADMIN — FENTANYL CITRATE 50 MCG: 50 INJECTION, SOLUTION INTRAMUSCULAR; INTRAVENOUS at 07:26

## 2017-01-01 RX ADMIN — ERTAPENEM SODIUM 1 G: 1 INJECTION, POWDER, LYOPHILIZED, FOR SOLUTION INTRAMUSCULAR; INTRAVENOUS at 11:07

## 2017-01-01 RX ADMIN — Medication 400 MG: at 20:17

## 2017-01-01 RX ADMIN — INSULIN ASPART 1 UNITS: 100 INJECTION, SOLUTION INTRAVENOUS; SUBCUTANEOUS at 04:10

## 2017-01-01 RX ADMIN — HEPARIN SODIUM 5000 UNITS: 5000 INJECTION, SOLUTION INTRAVENOUS; SUBCUTANEOUS at 18:32

## 2017-01-01 RX ADMIN — METOPROLOL TARTRATE 5 MG: 5 INJECTION, SOLUTION INTRAVENOUS at 13:05

## 2017-01-01 RX ADMIN — HEPARIN SODIUM 5000 UNITS: 5000 INJECTION, SOLUTION INTRAVENOUS; SUBCUTANEOUS at 20:34

## 2017-01-01 RX ADMIN — CIPROFLOXACIN 400 MG: 2 INJECTION, SOLUTION INTRAVENOUS at 15:24

## 2017-01-01 RX ADMIN — METOPROLOL TARTRATE 5 MG: 5 INJECTION, SOLUTION INTRAVENOUS at 00:52

## 2017-01-01 RX ADMIN — PANTOPRAZOLE SODIUM 40 MG: 40 INJECTION, POWDER, FOR SOLUTION INTRAVENOUS at 08:46

## 2017-01-01 RX ADMIN — INSULIN ASPART 1 UNITS: 100 INJECTION, SOLUTION INTRAVENOUS; SUBCUTANEOUS at 04:27

## 2017-01-01 RX ADMIN — ESMOLOL HYDROCHLORIDE 10 MG: 10 INJECTION, SOLUTION INTRAVENOUS at 08:57

## 2017-01-01 RX ADMIN — INSULIN ASPART 1 UNITS: 100 INJECTION, SOLUTION INTRAVENOUS; SUBCUTANEOUS at 03:38

## 2017-01-01 RX ADMIN — Medication 2 G: at 01:17

## 2017-01-01 RX ADMIN — LIDOCAINE 1 PATCH: 50 PATCH CUTANEOUS at 21:26

## 2017-01-01 RX ADMIN — HEPARIN SODIUM 5000 UNITS: 5000 INJECTION, SOLUTION INTRAVENOUS; SUBCUTANEOUS at 09:06

## 2017-01-01 RX ADMIN — IPRATROPIUM BROMIDE AND ALBUTEROL SULFATE 3 ML: .5; 3 SOLUTION RESPIRATORY (INHALATION) at 10:19

## 2017-01-01 RX ADMIN — METRONIDAZOLE 500 MG: 500 INJECTION, SOLUTION INTRAVENOUS at 03:31

## 2017-01-01 RX ADMIN — I.V. FAT EMULSION 250 ML: 20 EMULSION INTRAVENOUS at 19:43

## 2017-01-01 RX ADMIN — INSULIN ASPART 1 UNITS: 100 INJECTION, SOLUTION INTRAVENOUS; SUBCUTANEOUS at 00:36

## 2017-01-01 RX ADMIN — HEPARIN SODIUM 5000 UNITS: 5000 INJECTION, SOLUTION INTRAVENOUS; SUBCUTANEOUS at 06:29

## 2017-01-01 RX ADMIN — METRONIDAZOLE 500 MG: 500 INJECTION, SOLUTION INTRAVENOUS at 04:44

## 2017-01-01 RX ADMIN — METRONIDAZOLE 500 MG: 500 INJECTION, SOLUTION INTRAVENOUS at 19:36

## 2017-01-01 RX ADMIN — CALCIUM GLUCONATE: 94 INJECTION, SOLUTION INTRAVENOUS at 19:45

## 2017-01-01 RX ADMIN — ROCURONIUM BROMIDE 5 MG: 10 INJECTION INTRAVENOUS at 09:54

## 2017-01-01 RX ADMIN — ERTAPENEM SODIUM 1 G: 1 INJECTION, POWDER, LYOPHILIZED, FOR SOLUTION INTRAMUSCULAR; INTRAVENOUS at 11:43

## 2017-01-01 RX ADMIN — PANTOPRAZOLE SODIUM 40 MG: 40 INJECTION, POWDER, FOR SOLUTION INTRAVENOUS at 08:52

## 2017-01-01 RX ADMIN — LORAZEPAM 0.5 MG: 2 INJECTION INTRAMUSCULAR; INTRAVENOUS at 11:59

## 2017-01-01 RX ADMIN — INSULIN ASPART 1 UNITS: 100 INJECTION, SOLUTION INTRAVENOUS; SUBCUTANEOUS at 05:21

## 2017-01-01 RX ADMIN — POTASSIUM CHLORIDE: 2 INJECTION, SOLUTION, CONCENTRATE INTRAVENOUS at 20:54

## 2017-01-01 RX ADMIN — LIDOCAINE 1 PATCH: 50 PATCH CUTANEOUS at 20:50

## 2017-01-01 RX ADMIN — INSULIN ASPART 1 UNITS: 100 INJECTION, SOLUTION INTRAVENOUS; SUBCUTANEOUS at 23:50

## 2017-01-01 RX ADMIN — HEPARIN SODIUM 5000 UNITS: 5000 INJECTION, SOLUTION INTRAVENOUS; SUBCUTANEOUS at 23:52

## 2017-01-01 RX ADMIN — INSULIN ASPART 1 UNITS: 100 INJECTION, SOLUTION INTRAVENOUS; SUBCUTANEOUS at 04:55

## 2017-01-01 RX ADMIN — IPRATROPIUM BROMIDE AND ALBUTEROL SULFATE 3 ML: .5; 3 SOLUTION RESPIRATORY (INHALATION) at 09:55

## 2017-01-01 RX ADMIN — ALBUTEROL SULFATE 2.5 MG: 2.5 SOLUTION RESPIRATORY (INHALATION) at 10:31

## 2017-01-01 RX ADMIN — INSULIN ASPART 1 UNITS: 100 INJECTION, SOLUTION INTRAVENOUS; SUBCUTANEOUS at 19:39

## 2017-01-01 RX ADMIN — FUROSEMIDE 10 MG: 10 INJECTION, SOLUTION INTRAVENOUS at 07:04

## 2017-01-01 RX ADMIN — POTASSIUM CHLORIDE: 2 INJECTION, SOLUTION, CONCENTRATE INTRAVENOUS at 19:50

## 2017-01-01 RX ADMIN — PIPERACILLIN SODIUM,TAZOBACTAM SODIUM 3.38 G: 3; .375 INJECTION, POWDER, FOR SOLUTION INTRAVENOUS at 03:23

## 2017-01-01 RX ADMIN — ACETYLCYSTEINE 2 ML: 200 SOLUTION ORAL; RESPIRATORY (INHALATION) at 12:34

## 2017-01-01 RX ADMIN — HEPARIN SODIUM 5000 UNITS: 5000 INJECTION, SOLUTION INTRAVENOUS; SUBCUTANEOUS at 06:05

## 2017-01-01 RX ADMIN — IPRATROPIUM BROMIDE AND ALBUTEROL SULFATE 3 ML: .5; 3 SOLUTION RESPIRATORY (INHALATION) at 19:37

## 2017-01-01 RX ADMIN — ACETYLCYSTEINE 2 ML: 200 SOLUTION ORAL; RESPIRATORY (INHALATION) at 07:37

## 2017-01-01 RX ADMIN — ACETAMINOPHEN 650 MG: 650 SUPPOSITORY RECTAL at 10:35

## 2017-01-01 RX ADMIN — LIDOCAINE HYDROCHLORIDE 80 MG: 20 INJECTION, SOLUTION INFILTRATION; PERINEURAL at 06:08

## 2017-01-01 RX ADMIN — LIDOCAINE 1 PATCH: 50 PATCH CUTANEOUS at 19:38

## 2017-01-01 RX ADMIN — HEPARIN SODIUM 5000 UNITS: 5000 INJECTION, SOLUTION INTRAVENOUS; SUBCUTANEOUS at 17:54

## 2017-01-01 RX ADMIN — ONDANSETRON 4 MG: 2 INJECTION INTRAMUSCULAR; INTRAVENOUS at 11:12

## 2017-01-01 RX ADMIN — POTASSIUM CHLORIDE 10 MEQ: 7.46 INJECTION, SOLUTION INTRAVENOUS at 12:08

## 2017-01-01 RX ADMIN — INSULIN ASPART 1 UNITS: 100 INJECTION, SOLUTION INTRAVENOUS; SUBCUTANEOUS at 05:00

## 2017-01-01 RX ADMIN — DEXAMETHASONE SODIUM PHOSPHATE 5 MG: 4 INJECTION, SOLUTION INTRA-ARTICULAR; INTRALESIONAL; INTRAMUSCULAR; INTRAVENOUS; SOFT TISSUE at 07:19

## 2017-01-01 RX ADMIN — ACETYLCYSTEINE: 200 SOLUTION ORAL; RESPIRATORY (INHALATION) at 10:31

## 2017-01-01 RX ADMIN — AMIODARONE HYDROCHLORIDE 0.5 MG/MIN: 50 INJECTION, SOLUTION INTRAVENOUS at 20:28

## 2017-01-01 RX ADMIN — INSULIN ASPART 1 UNITS: 100 INJECTION, SOLUTION INTRAVENOUS; SUBCUTANEOUS at 19:27

## 2017-01-01 RX ADMIN — PHENYLEPHRINE HYDROCHLORIDE 100 MCG: 10 INJECTION, SOLUTION INTRAMUSCULAR; INTRAVENOUS; SUBCUTANEOUS at 06:14

## 2017-01-01 RX ADMIN — INSULIN ASPART 1 UNITS: 100 INJECTION, SOLUTION INTRAVENOUS; SUBCUTANEOUS at 23:41

## 2017-01-01 RX ADMIN — INSULIN ASPART 1 UNITS: 100 INJECTION, SOLUTION INTRAVENOUS; SUBCUTANEOUS at 00:35

## 2017-01-01 RX ADMIN — POTASSIUM CHLORIDE: 2 INJECTION, SOLUTION, CONCENTRATE INTRAVENOUS at 19:40

## 2017-01-01 RX ADMIN — IPRATROPIUM BROMIDE AND ALBUTEROL SULFATE 3 ML: .5; 3 SOLUTION RESPIRATORY (INHALATION) at 15:20

## 2017-01-01 RX ADMIN — INSULIN ASPART 1 UNITS: 100 INJECTION, SOLUTION INTRAVENOUS; SUBCUTANEOUS at 09:18

## 2017-01-01 RX ADMIN — INSULIN ASPART 1 UNITS: 100 INJECTION, SOLUTION INTRAVENOUS; SUBCUTANEOUS at 00:37

## 2017-01-01 RX ADMIN — POTASSIUM CHLORIDE 10 MEQ: 14.9 INJECTION, SOLUTION, CONCENTRATE PARENTERAL at 09:21

## 2017-01-01 RX ADMIN — ACETYLCYSTEINE 2 ML: 200 SOLUTION ORAL; RESPIRATORY (INHALATION) at 15:20

## 2017-01-01 RX ADMIN — ACETYLCYSTEINE 2 ML: 200 SOLUTION ORAL; RESPIRATORY (INHALATION) at 13:59

## 2017-01-01 RX ADMIN — CEFAZOLIN SODIUM 2 G: 2 INJECTION, SOLUTION INTRAVENOUS at 06:28

## 2017-01-01 RX ADMIN — INSULIN ASPART 1 UNITS: 100 INJECTION, SOLUTION INTRAVENOUS; SUBCUTANEOUS at 01:16

## 2017-01-01 RX ADMIN — METOPROLOL TARTRATE 5 MG: 5 INJECTION, SOLUTION INTRAVENOUS at 08:12

## 2017-01-01 RX ADMIN — IPRATROPIUM BROMIDE AND ALBUTEROL SULFATE 3 ML: .5; 3 SOLUTION RESPIRATORY (INHALATION) at 08:05

## 2017-01-01 RX ADMIN — SODIUM CHLORIDE, POTASSIUM CHLORIDE, SODIUM LACTATE AND CALCIUM CHLORIDE: 600; 310; 30; 20 INJECTION, SOLUTION INTRAVENOUS at 17:40

## 2017-01-01 RX ADMIN — DEXTROSE 0.5 MG/MIN: 5 SOLUTION INTRAVENOUS at 01:04

## 2017-01-01 RX ADMIN — POTASSIUM PHOSPHATE, MONOBASIC AND POTASSIUM PHOSPHATE, DIBASIC 25 MMOL: 224; 236 INJECTION, SOLUTION INTRAVENOUS at 02:09

## 2017-01-01 RX ADMIN — POTASSIUM CHLORIDE 10 MEQ: 14.9 INJECTION, SOLUTION, CONCENTRATE PARENTERAL at 10:32

## 2017-01-01 RX ADMIN — ONDANSETRON 4 MG: 2 INJECTION INTRAMUSCULAR; INTRAVENOUS at 06:22

## 2017-01-01 RX ADMIN — DEXTROSE 1 MG/MIN: 5 SOLUTION INTRAVENOUS at 16:05

## 2017-01-01 RX ADMIN — POTASSIUM CHLORIDE 10 MEQ: 14.9 INJECTION, SOLUTION, CONCENTRATE PARENTERAL at 06:47

## 2017-01-01 RX ADMIN — ACETYLCYSTEINE 2 ML: 200 SOLUTION ORAL; RESPIRATORY (INHALATION) at 08:16

## 2017-01-01 RX ADMIN — SODIUM CHLORIDE, POTASSIUM CHLORIDE, SODIUM LACTATE AND CALCIUM CHLORIDE 1000 ML: 600; 310; 30; 20 INJECTION, SOLUTION INTRAVENOUS at 01:11

## 2017-01-01 RX ADMIN — HEPARIN SODIUM 5000 UNITS: 5000 INJECTION, SOLUTION INTRAVENOUS; SUBCUTANEOUS at 18:33

## 2017-01-01 RX ADMIN — POTASSIUM CHLORIDE: 2 INJECTION, SOLUTION, CONCENTRATE INTRAVENOUS at 19:43

## 2017-01-01 RX ADMIN — HYDROMORPHONE HYDROCHLORIDE 0.2 MG: 10 INJECTION, SOLUTION INTRAMUSCULAR; INTRAVENOUS; SUBCUTANEOUS at 10:41

## 2017-01-01 RX ADMIN — METOPROLOL TARTRATE 5 MG: 5 INJECTION, SOLUTION INTRAVENOUS at 04:20

## 2017-01-01 RX ADMIN — ALBUTEROL SULFATE 2.5 MG: 2.5 SOLUTION RESPIRATORY (INHALATION) at 08:16

## 2017-01-01 RX ADMIN — ACETYLCYSTEINE 2 ML: 200 SOLUTION ORAL; RESPIRATORY (INHALATION) at 15:52

## 2017-01-01 RX ADMIN — METOPROLOL TARTRATE 5 MG: 5 INJECTION, SOLUTION INTRAVENOUS at 05:00

## 2017-01-01 RX ADMIN — POTASSIUM PHOSPHATE, MONOBASIC AND POTASSIUM PHOSPHATE, DIBASIC 20 MMOL: 224; 236 INJECTION, SOLUTION INTRAVENOUS at 22:21

## 2017-01-01 RX ADMIN — METOPROLOL TARTRATE 5 MG: 5 INJECTION, SOLUTION INTRAVENOUS at 03:39

## 2017-01-01 RX ADMIN — LIDOCAINE 1 PATCH: 50 PATCH CUTANEOUS at 19:50

## 2017-01-01 RX ADMIN — INSULIN ASPART 1 UNITS: 100 INJECTION, SOLUTION INTRAVENOUS; SUBCUTANEOUS at 07:38

## 2017-01-01 RX ADMIN — HEPARIN SODIUM 5000 UNITS: 5000 INJECTION, SOLUTION INTRAVENOUS; SUBCUTANEOUS at 18:34

## 2017-01-01 RX ADMIN — INSULIN ASPART 1 UNITS: 100 INJECTION, SOLUTION INTRAVENOUS; SUBCUTANEOUS at 03:23

## 2017-01-01 RX ADMIN — IPRATROPIUM BROMIDE AND ALBUTEROL SULFATE 3 ML: .5; 3 SOLUTION RESPIRATORY (INHALATION) at 19:10

## 2017-01-01 RX ADMIN — IPRATROPIUM BROMIDE AND ALBUTEROL SULFATE 3 ML: .5; 3 SOLUTION RESPIRATORY (INHALATION) at 07:37

## 2017-01-01 RX ADMIN — ACETYLCYSTEINE 2 ML: 200 SOLUTION ORAL; RESPIRATORY (INHALATION) at 08:37

## 2017-01-01 RX ADMIN — METOPROLOL TARTRATE 5 MG: 5 INJECTION, SOLUTION INTRAVENOUS at 08:54

## 2017-01-01 RX ADMIN — CIPROFLOXACIN 400 MG: 2 INJECTION, SOLUTION INTRAVENOUS at 14:08

## 2017-01-01 RX ADMIN — DEXTROSE 0.5 MG/MIN: 5 SOLUTION INTRAVENOUS at 06:14

## 2017-01-01 RX ADMIN — IPRATROPIUM BROMIDE AND ALBUTEROL SULFATE 3 ML: .5; 3 SOLUTION RESPIRATORY (INHALATION) at 13:59

## 2017-01-01 RX ADMIN — INSULIN ASPART 1 UNITS: 100 INJECTION, SOLUTION INTRAVENOUS; SUBCUTANEOUS at 00:44

## 2017-01-01 RX ADMIN — OLANZAPINE 5 MG: 5 TABLET, ORALLY DISINTEGRATING ORAL at 21:32

## 2017-01-01 RX ADMIN — METOPROLOL TARTRATE 5 MG: 5 INJECTION, SOLUTION INTRAVENOUS at 06:00

## 2017-01-01 RX ADMIN — SODIUM CHLORIDE, POTASSIUM CHLORIDE, SODIUM LACTATE AND CALCIUM CHLORIDE 1000 ML: 600; 310; 30; 20 INJECTION, SOLUTION INTRAVENOUS at 02:25

## 2017-01-01 RX ADMIN — ACETAMINOPHEN 650 MG: 650 SUPPOSITORY RECTAL at 23:20

## 2017-01-01 RX ADMIN — AMIODARONE HYDROCHLORIDE 150 MG: 1.5 INJECTION, SOLUTION INTRAVENOUS at 11:39

## 2017-01-01 RX ADMIN — INSULIN ASPART 1 UNITS: 100 INJECTION, SOLUTION INTRAVENOUS; SUBCUTANEOUS at 07:54

## 2017-01-01 RX ADMIN — INSULIN ASPART 1 UNITS: 100 INJECTION, SOLUTION INTRAVENOUS; SUBCUTANEOUS at 00:23

## 2017-01-01 RX ADMIN — PANTOPRAZOLE SODIUM 40 MG: 40 INJECTION, POWDER, FOR SOLUTION INTRAVENOUS at 13:10

## 2017-01-01 RX ADMIN — IPRATROPIUM BROMIDE AND ALBUTEROL SULFATE 3 ML: .5; 3 SOLUTION RESPIRATORY (INHALATION) at 14:06

## 2017-01-01 RX ADMIN — METRONIDAZOLE 500 MG: 500 INJECTION, SOLUTION INTRAVENOUS at 19:52

## 2017-01-01 RX ADMIN — METOPROLOL TARTRATE 5 MG: 5 INJECTION, SOLUTION INTRAVENOUS at 23:42

## 2017-01-01 RX ADMIN — Medication 0.3 MG: at 13:36

## 2017-01-01 RX ADMIN — METOPROLOL TARTRATE 5 MG: 5 INJECTION, SOLUTION INTRAVENOUS at 13:08

## 2017-01-01 RX ADMIN — SODIUM CHLORIDE 500 ML: 900 INJECTION, SOLUTION INTRAVENOUS at 16:13

## 2017-01-01 RX ADMIN — POTASSIUM CHLORIDE 10 MEQ: 14.9 INJECTION, SOLUTION, CONCENTRATE PARENTERAL at 07:47

## 2017-01-01 RX ADMIN — ONDANSETRON 4 MG: 2 INJECTION INTRAMUSCULAR; INTRAVENOUS at 13:48

## 2017-01-01 RX ADMIN — FENTANYL CITRATE 25 MCG: 50 INJECTION, SOLUTION INTRAMUSCULAR; INTRAVENOUS at 11:16

## 2017-01-01 RX ADMIN — PANTOPRAZOLE SODIUM 40 MG: 40 INJECTION, POWDER, FOR SOLUTION INTRAVENOUS at 08:49

## 2017-01-01 RX ADMIN — IPRATROPIUM BROMIDE AND ALBUTEROL SULFATE 3 ML: .5; 3 SOLUTION RESPIRATORY (INHALATION) at 19:39

## 2017-01-01 RX ADMIN — Medication 0.2 MG: at 22:37

## 2017-01-01 RX ADMIN — PANTOPRAZOLE SODIUM 40 MG: 40 INJECTION, POWDER, FOR SOLUTION INTRAVENOUS at 08:19

## 2017-01-01 RX ADMIN — DEXTROSE 0.5 MG/MIN: 5 SOLUTION INTRAVENOUS at 12:31

## 2017-01-01 RX ADMIN — CIPROFLOXACIN 400 MG: 2 INJECTION, SOLUTION INTRAVENOUS at 15:36

## 2017-01-01 RX ADMIN — I.V. FAT EMULSION 250 ML: 20 EMULSION INTRAVENOUS at 13:03

## 2017-01-01 RX ADMIN — SODIUM CHLORIDE, POTASSIUM CHLORIDE, SODIUM LACTATE AND CALCIUM CHLORIDE 500 ML: 600; 310; 30; 20 INJECTION, SOLUTION INTRAVENOUS at 06:19

## 2017-01-01 RX ADMIN — METOPROLOL TARTRATE 5 MG: 5 INJECTION, SOLUTION INTRAVENOUS at 17:57

## 2017-01-01 RX ADMIN — HEPARIN SODIUM 5000 UNITS: 5000 INJECTION, SOLUTION INTRAVENOUS; SUBCUTANEOUS at 20:00

## 2017-01-01 RX ADMIN — INSULIN ASPART 1 UNITS: 100 INJECTION, SOLUTION INTRAVENOUS; SUBCUTANEOUS at 00:59

## 2017-01-01 RX ADMIN — ACETYLCYSTEINE: 200 SOLUTION ORAL; RESPIRATORY (INHALATION) at 09:33

## 2017-01-01 RX ADMIN — IPRATROPIUM BROMIDE AND ALBUTEROL SULFATE 3 ML: .5; 3 SOLUTION RESPIRATORY (INHALATION) at 13:36

## 2017-01-01 RX ADMIN — MORPHINE SULFATE 2 MG: 2 INJECTION, SOLUTION INTRAMUSCULAR; INTRAVENOUS at 02:25

## 2017-01-01 RX ADMIN — FENTANYL CITRATE 50 MCG: 50 INJECTION, SOLUTION INTRAMUSCULAR; INTRAVENOUS at 10:43

## 2017-01-01 RX ADMIN — METRONIDAZOLE 500 MG: 500 INJECTION, SOLUTION INTRAVENOUS at 20:01

## 2017-01-01 RX ADMIN — DEXTROSE 0.5 MG/MIN: 5 SOLUTION INTRAVENOUS at 00:47

## 2017-01-01 RX ADMIN — ATROPINE SULFATE 2 DROP: 10 SOLUTION/ DROPS OPHTHALMIC at 09:59

## 2017-01-01 RX ADMIN — PANTOPRAZOLE SODIUM 40 MG: 40 INJECTION, POWDER, FOR SOLUTION INTRAVENOUS at 10:07

## 2017-01-01 RX ADMIN — HEPARIN SODIUM 5000 UNITS: 5000 INJECTION, SOLUTION INTRAVENOUS; SUBCUTANEOUS at 20:06

## 2017-01-01 RX ADMIN — ATROPINE SULFATE 2 DROP: 10 SOLUTION/ DROPS OPHTHALMIC at 12:02

## 2017-01-01 RX ADMIN — ALBUTEROL SULFATE 2.5 MG: 2.5 SOLUTION RESPIRATORY (INHALATION) at 13:02

## 2017-01-01 RX ADMIN — IPRATROPIUM BROMIDE AND ALBUTEROL SULFATE 3 ML: .5; 3 SOLUTION RESPIRATORY (INHALATION) at 20:27

## 2017-01-01 RX ADMIN — MORPHINE SULFATE 4 MG: 2 INJECTION, SOLUTION INTRAMUSCULAR; INTRAVENOUS at 13:06

## 2017-01-01 RX ADMIN — METOPROLOL TARTRATE 5 MG: 5 INJECTION, SOLUTION INTRAVENOUS at 17:50

## 2017-01-01 RX ADMIN — I.V. FAT EMULSION 250 ML: 20 EMULSION INTRAVENOUS at 20:12

## 2017-01-01 RX ADMIN — SODIUM CHLORIDE, PRESERVATIVE FREE 5 ML: 5 INJECTION INTRAVENOUS at 18:38

## 2017-01-01 RX ADMIN — OLANZAPINE 5 MG: 5 TABLET, ORALLY DISINTEGRATING ORAL at 00:51

## 2017-01-01 RX ADMIN — AMIODARONE HYDROCHLORIDE 150 MG: 1.5 INJECTION, SOLUTION INTRAVENOUS at 00:35

## 2017-01-01 RX ADMIN — METOPROLOL TARTRATE 5 MG: 5 INJECTION, SOLUTION INTRAVENOUS at 08:36

## 2017-01-01 RX ADMIN — Medication 0.2 MG: at 04:39

## 2017-01-01 RX ADMIN — METOPROLOL TARTRATE 5 MG: 5 INJECTION, SOLUTION INTRAVENOUS at 13:28

## 2017-01-01 RX ADMIN — INSULIN ASPART 1 UNITS: 100 INJECTION, SOLUTION INTRAVENOUS; SUBCUTANEOUS at 08:51

## 2017-01-01 RX ADMIN — FENTANYL CITRATE 50 MCG: 50 INJECTION, SOLUTION INTRAMUSCULAR; INTRAVENOUS at 06:43

## 2017-01-01 RX ADMIN — LIDOCAINE 1 PATCH: 50 PATCH CUTANEOUS at 19:14

## 2017-01-01 RX ADMIN — LIDOCAINE 1 PATCH: 50 PATCH CUTANEOUS at 20:02

## 2017-01-01 RX ADMIN — ALBUTEROL SULFATE 2.5 MG: 2.5 SOLUTION RESPIRATORY (INHALATION) at 07:54

## 2017-01-01 RX ADMIN — METOPROLOL TARTRATE 5 MG: 5 INJECTION, SOLUTION INTRAVENOUS at 06:39

## 2017-01-01 RX ADMIN — METOPROLOL TARTRATE 5 MG: 5 INJECTION, SOLUTION INTRAVENOUS at 18:33

## 2017-01-01 RX ADMIN — ACETYLCYSTEINE 2 ML: 200 SOLUTION ORAL; RESPIRATORY (INHALATION) at 07:38

## 2017-01-01 RX ADMIN — ESMOLOL HYDROCHLORIDE 10 MG: 10 INJECTION, SOLUTION INTRAVENOUS at 07:28

## 2017-01-01 RX ADMIN — SODIUM CHLORIDE, PRESERVATIVE FREE 71 ML: 5 INJECTION INTRAVENOUS at 14:04

## 2017-01-01 RX ADMIN — DEXTROSE 0.5 MG/MIN: 5 SOLUTION INTRAVENOUS at 16:39

## 2017-01-01 RX ADMIN — INSULIN ASPART 1 UNITS: 100 INJECTION, SOLUTION INTRAVENOUS; SUBCUTANEOUS at 17:03

## 2017-01-01 RX ADMIN — PANTOPRAZOLE SODIUM 40 MG: 40 INJECTION, POWDER, FOR SOLUTION INTRAVENOUS at 08:00

## 2017-01-01 RX ADMIN — MORPHINE SULFATE 2 MG: 2 INJECTION, SOLUTION INTRAMUSCULAR; INTRAVENOUS at 08:22

## 2017-01-01 RX ADMIN — IPRATROPIUM BROMIDE AND ALBUTEROL SULFATE 3 ML: .5; 3 SOLUTION RESPIRATORY (INHALATION) at 16:22

## 2017-01-01 RX ADMIN — ACETYLCYSTEINE 2 ML: 200 SOLUTION ORAL; RESPIRATORY (INHALATION) at 11:46

## 2017-01-01 RX ADMIN — POTASSIUM CHLORIDE 10 MEQ: 14.9 INJECTION, SOLUTION, CONCENTRATE PARENTERAL at 09:48

## 2017-01-01 RX ADMIN — HEPARIN SODIUM 5000 UNITS: 5000 INJECTION, SOLUTION INTRAVENOUS; SUBCUTANEOUS at 18:39

## 2017-01-01 RX ADMIN — INSULIN ASPART 1 UNITS: 100 INJECTION, SOLUTION INTRAVENOUS; SUBCUTANEOUS at 23:33

## 2017-01-01 RX ADMIN — INSULIN ASPART 1 UNITS: 100 INJECTION, SOLUTION INTRAVENOUS; SUBCUTANEOUS at 01:05

## 2017-01-01 RX ADMIN — IPRATROPIUM BROMIDE AND ALBUTEROL SULFATE 3 ML: .5; 3 SOLUTION RESPIRATORY (INHALATION) at 09:03

## 2017-01-01 RX ADMIN — INSULIN ASPART 1 UNITS: 100 INJECTION, SOLUTION INTRAVENOUS; SUBCUTANEOUS at 07:47

## 2017-01-01 RX ADMIN — Medication 10 MG: at 06:15

## 2017-01-01 RX ADMIN — CALCIUM GLUCONATE: 94 INJECTION, SOLUTION INTRAVENOUS at 20:19

## 2017-01-01 RX ADMIN — DEXTROSE 0.5 MG/MIN: 5 SOLUTION INTRAVENOUS at 08:43

## 2017-01-01 RX ADMIN — LIDOCAINE 1 PATCH: 50 PATCH CUTANEOUS at 20:14

## 2017-01-01 RX ADMIN — HEPARIN SODIUM 5000 UNITS: 5000 INJECTION, SOLUTION INTRAVENOUS; SUBCUTANEOUS at 19:49

## 2017-01-01 RX ADMIN — ACETYLCYSTEINE 2 ML: 200 SOLUTION ORAL; RESPIRATORY (INHALATION) at 13:37

## 2017-01-01 RX ADMIN — PANTOPRAZOLE SODIUM 40 MG: 40 INJECTION, POWDER, FOR SOLUTION INTRAVENOUS at 07:38

## 2017-01-01 RX ADMIN — METOPROLOL TARTRATE 5 MG: 5 INJECTION, SOLUTION INTRAVENOUS at 20:26

## 2017-01-01 RX ADMIN — DEXTROSE 0.5 MG/MIN: 5 SOLUTION INTRAVENOUS at 22:04

## 2017-01-01 RX ADMIN — HEPARIN SODIUM 5000 UNITS: 5000 INJECTION, SOLUTION INTRAVENOUS; SUBCUTANEOUS at 07:57

## 2017-01-01 RX ADMIN — HEPARIN SODIUM 5000 UNITS: 5000 INJECTION, SOLUTION INTRAVENOUS; SUBCUTANEOUS at 08:28

## 2017-01-01 RX ADMIN — ALBUTEROL SULFATE 2.5 MG: 2.5 SOLUTION RESPIRATORY (INHALATION) at 13:20

## 2017-01-01 RX ADMIN — METOPROLOL TARTRATE 5 MG: 5 INJECTION, SOLUTION INTRAVENOUS at 01:30

## 2017-01-01 RX ADMIN — METOPROLOL TARTRATE 5 MG: 5 INJECTION INTRAVENOUS at 16:19

## 2017-01-01 RX ADMIN — ATROPINE SULFATE 2 DROP: 10 SOLUTION/ DROPS OPHTHALMIC at 13:09

## 2017-01-01 RX ADMIN — HEPARIN SODIUM 5000 UNITS: 5000 INJECTION, SOLUTION INTRAVENOUS; SUBCUTANEOUS at 18:19

## 2017-01-01 RX ADMIN — INSULIN ASPART 1 UNITS: 100 INJECTION, SOLUTION INTRAVENOUS; SUBCUTANEOUS at 03:50

## 2017-01-01 RX ADMIN — HEPARIN SODIUM 5000 UNITS: 5000 INJECTION, SOLUTION INTRAVENOUS; SUBCUTANEOUS at 18:00

## 2017-01-01 RX ADMIN — DEXTROSE 1 MG/MIN: 5 SOLUTION INTRAVENOUS at 11:34

## 2017-01-01 RX ADMIN — OLANZAPINE 5 MG: 5 TABLET, ORALLY DISINTEGRATING ORAL at 22:09

## 2017-01-01 RX ADMIN — LABETALOL HYDROCHLORIDE 5 MG: 5 INJECTION, SOLUTION INTRAVENOUS at 09:35

## 2017-01-01 RX ADMIN — INSULIN ASPART 1 UNITS: 100 INJECTION, SOLUTION INTRAVENOUS; SUBCUTANEOUS at 04:06

## 2017-01-01 RX ADMIN — SODIUM CHLORIDE, PRESERVATIVE FREE 5 ML: 5 INJECTION INTRAVENOUS at 06:28

## 2017-01-01 RX ADMIN — IPRATROPIUM BROMIDE AND ALBUTEROL SULFATE 3 ML: .5; 3 SOLUTION RESPIRATORY (INHALATION) at 11:45

## 2017-01-01 RX ADMIN — ACETYLCYSTEINE 2 ML: 200 SOLUTION ORAL; RESPIRATORY (INHALATION) at 12:12

## 2017-01-01 RX ADMIN — Medication 0.2 MG: at 19:06

## 2017-01-01 RX ADMIN — Medication 0.2 MG: at 02:19

## 2017-01-01 RX ADMIN — CEFAZOLIN 1 G: 1 INJECTION, POWDER, FOR SOLUTION INTRAMUSCULAR; INTRAVENOUS at 08:49

## 2017-01-01 RX ADMIN — INSULIN ASPART 1 UNITS: 100 INJECTION, SOLUTION INTRAVENOUS; SUBCUTANEOUS at 08:05

## 2017-01-01 RX ADMIN — POTASSIUM CHLORIDE 10 MEQ: 14.9 INJECTION, SOLUTION, CONCENTRATE PARENTERAL at 18:34

## 2017-01-01 RX ADMIN — FUROSEMIDE 20 MG: 10 INJECTION, SOLUTION INTRAVENOUS at 17:03

## 2017-01-01 RX ADMIN — SODIUM CHLORIDE, PRESERVATIVE FREE 5 ML: 5 INJECTION INTRAVENOUS at 15:31

## 2017-01-01 RX ADMIN — METOPROLOL TARTRATE 5 MG: 5 INJECTION, SOLUTION INTRAVENOUS at 22:17

## 2017-01-01 RX ADMIN — LORAZEPAM 0.5 MG: 2 INJECTION INTRAMUSCULAR; INTRAVENOUS at 16:22

## 2017-01-01 RX ADMIN — INSULIN ASPART 1 UNITS: 100 INJECTION, SOLUTION INTRAVENOUS; SUBCUTANEOUS at 20:02

## 2017-01-01 RX ADMIN — PROCHLORPERAZINE EDISYLATE 5 MG: 5 INJECTION INTRAMUSCULAR; INTRAVENOUS at 12:20

## 2017-01-01 RX ADMIN — FUROSEMIDE 10 MG: 10 INJECTION, SOLUTION INTRAVENOUS at 14:54

## 2017-01-01 RX ADMIN — SODIUM CHLORIDE, POTASSIUM CHLORIDE, SODIUM LACTATE AND CALCIUM CHLORIDE: 600; 310; 30; 20 INJECTION, SOLUTION INTRAVENOUS at 06:06

## 2017-01-01 RX ADMIN — ATROPINE SULFATE 2 DROP: 10 SOLUTION/ DROPS OPHTHALMIC at 04:20

## 2017-01-01 RX ADMIN — KETOROLAC TROMETHAMINE 15 MG: 15 INJECTION, SOLUTION INTRAMUSCULAR; INTRAVENOUS at 14:39

## 2017-01-01 RX ADMIN — METOPROLOL TARTRATE 5 MG: 5 INJECTION, SOLUTION INTRAVENOUS at 05:42

## 2017-01-01 RX ADMIN — HYDROMORPHONE HYDROCHLORIDE 0.2 MG: 10 INJECTION, SOLUTION INTRAMUSCULAR; INTRAVENOUS; SUBCUTANEOUS at 03:55

## 2017-01-01 RX ADMIN — IPRATROPIUM BROMIDE AND ALBUTEROL SULFATE 3 ML: .5; 3 SOLUTION RESPIRATORY (INHALATION) at 12:19

## 2017-01-01 RX ADMIN — SODIUM PHOSPHATE, MONOBASIC, MONOHYDRATE AND SODIUM PHOSPHATE, DIBASIC, ANHYDROUS 15 MMOL: 276; 142 INJECTION, SOLUTION INTRAVENOUS at 11:19

## 2017-01-01 RX ADMIN — ERTAPENEM SODIUM 1 G: 1 INJECTION, POWDER, LYOPHILIZED, FOR SOLUTION INTRAMUSCULAR; INTRAVENOUS at 11:12

## 2017-01-01 RX ADMIN — POTASSIUM CHLORIDE 10 MEQ: 14.9 INJECTION, SOLUTION, CONCENTRATE PARENTERAL at 16:49

## 2017-01-01 RX ADMIN — ACETYLCYSTEINE 2 ML: 200 SOLUTION ORAL; RESPIRATORY (INHALATION) at 07:54

## 2017-01-01 RX ADMIN — DEXTROSE 0.5 MG/MIN: 5 SOLUTION INTRAVENOUS at 07:41

## 2017-01-01 RX ADMIN — DEXTROSE 0.5 MG/MIN: 5 SOLUTION INTRAVENOUS at 16:57

## 2017-01-01 RX ADMIN — CALCIUM GLUCONATE: 94 INJECTION, SOLUTION INTRAVENOUS at 20:50

## 2017-01-01 RX ADMIN — NALOXONE HYDROCHLORIDE 0.4 MG: 0.4 INJECTION, SOLUTION INTRAMUSCULAR; INTRAVENOUS; SUBCUTANEOUS at 18:33

## 2017-01-01 RX ADMIN — IPRATROPIUM BROMIDE AND ALBUTEROL SULFATE 3 ML: .5; 3 SOLUTION RESPIRATORY (INHALATION) at 08:28

## 2017-01-01 RX ADMIN — LIDOCAINE 1 PATCH: 50 PATCH CUTANEOUS at 20:17

## 2017-01-01 RX ADMIN — ALBUTEROL SULFATE 2.5 MG: 2.5 SOLUTION RESPIRATORY (INHALATION) at 08:11

## 2017-01-01 RX ADMIN — POTASSIUM CHLORIDE 10 MEQ: 14.9 INJECTION, SOLUTION, CONCENTRATE PARENTERAL at 08:37

## 2017-01-01 RX ADMIN — ACETYLCYSTEINE 2 ML: 200 SOLUTION ORAL; RESPIRATORY (INHALATION) at 13:02

## 2017-01-01 RX ADMIN — ALBUTEROL SULFATE 2.5 MG: 2.5 SOLUTION RESPIRATORY (INHALATION) at 07:38

## 2017-01-01 RX ADMIN — I.V. FAT EMULSION 250 ML: 20 EMULSION INTRAVENOUS at 20:08

## 2017-01-01 RX ADMIN — METRONIDAZOLE 500 MG: 500 INJECTION, SOLUTION INTRAVENOUS at 04:40

## 2017-01-01 RX ADMIN — METOPROLOL TARTRATE 2.5 MG: 5 INJECTION INTRAVENOUS at 11:54

## 2017-01-01 RX ADMIN — INSULIN ASPART 1 UNITS: 100 INJECTION, SOLUTION INTRAVENOUS; SUBCUTANEOUS at 04:59

## 2017-01-01 RX ADMIN — INSULIN ASPART 1 UNITS: 100 INJECTION, SOLUTION INTRAVENOUS; SUBCUTANEOUS at 03:45

## 2017-01-01 RX ADMIN — METOPROLOL TARTRATE 5 MG: 5 INJECTION, SOLUTION INTRAVENOUS at 07:50

## 2017-01-01 RX ADMIN — HEPARIN SODIUM 5000 UNITS: 5000 INJECTION, SOLUTION INTRAVENOUS; SUBCUTANEOUS at 20:42

## 2017-01-01 RX ADMIN — Medication 0.3 MG: at 10:52

## 2017-01-01 RX ADMIN — ERTAPENEM SODIUM 1 G: 1 INJECTION, POWDER, LYOPHILIZED, FOR SOLUTION INTRAMUSCULAR; INTRAVENOUS at 13:01

## 2017-01-01 RX ADMIN — ALBUTEROL SULFATE 2.5 MG: 2.5 SOLUTION RESPIRATORY (INHALATION) at 20:32

## 2017-01-01 RX ADMIN — POTASSIUM CHLORIDE: 2 INJECTION, SOLUTION, CONCENTRATE INTRAVENOUS at 20:30

## 2017-01-01 RX ADMIN — POTASSIUM CHLORIDE: 2 INJECTION, SOLUTION, CONCENTRATE INTRAVENOUS at 20:46

## 2017-01-01 RX ADMIN — IPRATROPIUM BROMIDE AND ALBUTEROL SULFATE 3 ML: .5; 3 SOLUTION RESPIRATORY (INHALATION) at 15:52

## 2017-01-01 RX ADMIN — HEPARIN SODIUM 5000 UNITS: 5000 INJECTION, SOLUTION INTRAVENOUS; SUBCUTANEOUS at 05:00

## 2017-01-01 RX ADMIN — POTASSIUM CHLORIDE: 2 INJECTION, SOLUTION, CONCENTRATE INTRAVENOUS at 19:59

## 2017-01-01 RX ADMIN — SODIUM CHLORIDE 500 MG: 9 INJECTION, SOLUTION INTRAVENOUS at 14:16

## 2017-01-01 RX ADMIN — INSULIN ASPART 1 UNITS: 100 INJECTION, SOLUTION INTRAVENOUS; SUBCUTANEOUS at 08:37

## 2017-01-01 RX ADMIN — INSULIN ASPART 1 UNITS: 100 INJECTION, SOLUTION INTRAVENOUS; SUBCUTANEOUS at 08:33

## 2017-01-01 RX ADMIN — INSULIN ASPART 1 UNITS: 100 INJECTION, SOLUTION INTRAVENOUS; SUBCUTANEOUS at 23:42

## 2017-01-01 RX ADMIN — HEPARIN SODIUM 5000 UNITS: 5000 INJECTION, SOLUTION INTRAVENOUS; SUBCUTANEOUS at 06:00

## 2017-01-01 RX ADMIN — I.V. FAT EMULSION 250 ML: 20 EMULSION INTRAVENOUS at 07:52

## 2017-01-01 RX ADMIN — METRONIDAZOLE 500 MG: 500 INJECTION, SOLUTION INTRAVENOUS at 04:55

## 2017-01-01 RX ADMIN — SODIUM CHLORIDE, PRESERVATIVE FREE 5 ML: 5 INJECTION INTRAVENOUS at 06:30

## 2017-01-01 RX ADMIN — ESMOLOL HYDROCHLORIDE 10 MG: 10 INJECTION, SOLUTION INTRAVENOUS at 08:00

## 2017-01-01 RX ADMIN — METRONIDAZOLE 500 MG: 500 INJECTION, SOLUTION INTRAVENOUS at 13:40

## 2017-01-01 RX ADMIN — ROCURONIUM BROMIDE 5 MG: 10 INJECTION INTRAVENOUS at 08:58

## 2017-01-01 RX ADMIN — IPRATROPIUM BROMIDE AND ALBUTEROL SULFATE 3 ML: .5; 3 SOLUTION RESPIRATORY (INHALATION) at 19:11

## 2017-01-01 RX ADMIN — MORPHINE SULFATE 4 MG: 2 INJECTION, SOLUTION INTRAMUSCULAR; INTRAVENOUS at 15:59

## 2017-01-01 RX ADMIN — HEPARIN SODIUM 5000 UNITS: 5000 INJECTION, SOLUTION INTRAVENOUS; SUBCUTANEOUS at 08:12

## 2017-01-01 RX ADMIN — ERTAPENEM SODIUM 1 G: 1 INJECTION, POWDER, LYOPHILIZED, FOR SOLUTION INTRAMUSCULAR; INTRAVENOUS at 13:55

## 2017-01-01 RX ADMIN — HEPARIN SODIUM 5000 UNITS: 5000 INJECTION, SOLUTION INTRAVENOUS; SUBCUTANEOUS at 08:27

## 2017-01-01 RX ADMIN — INSULIN ASPART 1 UNITS: 100 INJECTION, SOLUTION INTRAVENOUS; SUBCUTANEOUS at 03:56

## 2017-01-01 RX ADMIN — INSULIN ASPART 1 UNITS: 100 INJECTION, SOLUTION INTRAVENOUS; SUBCUTANEOUS at 16:43

## 2017-01-01 RX ADMIN — POTASSIUM CHLORIDE AND SODIUM CHLORIDE: 450; 150 INJECTION, SOLUTION INTRAVENOUS at 10:45

## 2017-01-01 RX ADMIN — ALBUTEROL SULFATE 2.5 MG: 2.5 SOLUTION RESPIRATORY (INHALATION) at 09:14

## 2017-01-01 RX ADMIN — IPRATROPIUM BROMIDE AND ALBUTEROL SULFATE 3 ML: .5; 3 SOLUTION RESPIRATORY (INHALATION) at 14:34

## 2017-01-01 RX ADMIN — HEPARIN SODIUM 5000 UNITS: 5000 INJECTION, SOLUTION INTRAVENOUS; SUBCUTANEOUS at 17:16

## 2017-01-01 RX ADMIN — OLANZAPINE 5 MG: 5 TABLET, ORALLY DISINTEGRATING ORAL at 21:11

## 2017-01-01 RX ADMIN — POTASSIUM CHLORIDE 10 MEQ: 14.9 INJECTION, SOLUTION, CONCENTRATE PARENTERAL at 08:44

## 2017-01-01 RX ADMIN — METRONIDAZOLE 500 MG: 500 INJECTION, SOLUTION INTRAVENOUS at 20:54

## 2017-01-01 RX ADMIN — METOPROLOL TARTRATE 5 MG: 5 INJECTION, SOLUTION INTRAVENOUS at 17:47

## 2017-01-01 RX ADMIN — INSULIN ASPART 1 UNITS: 100 INJECTION, SOLUTION INTRAVENOUS; SUBCUTANEOUS at 12:34

## 2017-01-01 RX ADMIN — Medication 0.2 MG: at 19:35

## 2017-01-01 RX ADMIN — INSULIN ASPART 1 UNITS: 100 INJECTION, SOLUTION INTRAVENOUS; SUBCUTANEOUS at 00:48

## 2017-01-01 RX ADMIN — ACETYLCYSTEINE 2 ML: 200 SOLUTION ORAL; RESPIRATORY (INHALATION) at 20:47

## 2017-01-01 RX ADMIN — INSULIN ASPART 1 UNITS: 100 INJECTION, SOLUTION INTRAVENOUS; SUBCUTANEOUS at 08:53

## 2017-01-01 RX ADMIN — INSULIN ASPART 2 UNITS: 100 INJECTION, SOLUTION INTRAVENOUS; SUBCUTANEOUS at 04:18

## 2017-01-01 RX ADMIN — BISACODYL 10 MG: 10 SUPPOSITORY RECTAL at 14:11

## 2017-01-01 RX ADMIN — ERTAPENEM SODIUM 1 G: 1 INJECTION, POWDER, LYOPHILIZED, FOR SOLUTION INTRAMUSCULAR; INTRAVENOUS at 11:50

## 2017-01-01 RX ADMIN — Medication 0.2 MG: at 20:02

## 2017-01-01 RX ADMIN — POTASSIUM CHLORIDE 10 MEQ: 14.9 INJECTION, SOLUTION, CONCENTRATE PARENTERAL at 23:10

## 2017-01-01 RX ADMIN — PANTOPRAZOLE SODIUM 40 MG: 40 INJECTION, POWDER, FOR SOLUTION INTRAVENOUS at 09:26

## 2017-01-01 RX ADMIN — CIPROFLOXACIN 400 MG: 2 INJECTION, SOLUTION INTRAVENOUS at 12:30

## 2017-01-01 RX ADMIN — HEPARIN SODIUM 5000 UNITS: 5000 INJECTION, SOLUTION INTRAVENOUS; SUBCUTANEOUS at 08:33

## 2017-01-01 RX ADMIN — ACETYLCYSTEINE 2 ML: 200 SOLUTION ORAL; RESPIRATORY (INHALATION) at 21:23

## 2017-01-01 RX ADMIN — METOPROLOL TARTRATE 5 MG: 5 INJECTION INTRAVENOUS at 15:00

## 2017-01-01 RX ADMIN — HEPARIN SODIUM 5000 UNITS: 5000 INJECTION, SOLUTION INTRAVENOUS; SUBCUTANEOUS at 19:14

## 2017-01-01 RX ADMIN — SODIUM CHLORIDE 500 ML: 9 INJECTION, SOLUTION INTRAVENOUS at 10:15

## 2017-01-01 RX ADMIN — FUROSEMIDE 10 MG: 10 INJECTION, SOLUTION INTRAVENOUS at 08:33

## 2017-01-01 RX ADMIN — I.V. FAT EMULSION 250 ML: 20 EMULSION INTRAVENOUS at 19:38

## 2017-01-01 RX ADMIN — SODIUM CHLORIDE 1000 ML: 9 INJECTION, SOLUTION INTRAVENOUS at 11:08

## 2017-01-01 RX ADMIN — METOPROLOL TARTRATE 5 MG: 5 INJECTION, SOLUTION INTRAVENOUS at 11:00

## 2017-01-01 RX ADMIN — Medication 2 G: at 06:52

## 2017-01-01 RX ADMIN — INSULIN ASPART 1 UNITS: 100 INJECTION, SOLUTION INTRAVENOUS; SUBCUTANEOUS at 16:25

## 2017-01-01 RX ADMIN — INSULIN ASPART 2 UNITS: 100 INJECTION, SOLUTION INTRAVENOUS; SUBCUTANEOUS at 04:50

## 2017-01-01 RX ADMIN — ACETYLCYSTEINE 2 ML: 200 SOLUTION ORAL; RESPIRATORY (INHALATION) at 09:51

## 2017-01-01 RX ADMIN — SODIUM CHLORIDE, PRESERVATIVE FREE 5 ML: 5 INJECTION INTRAVENOUS at 11:14

## 2017-01-01 RX ADMIN — ACETAMINOPHEN 650 MG: 650 SUPPOSITORY RECTAL at 08:27

## 2017-01-01 RX ADMIN — I.V. FAT EMULSION 250 ML: 20 EMULSION INTRAVENOUS at 20:00

## 2017-01-01 RX ADMIN — Medication 2 G: at 18:52

## 2017-01-01 RX ADMIN — INSULIN ASPART 1 UNITS: 100 INJECTION, SOLUTION INTRAVENOUS; SUBCUTANEOUS at 17:38

## 2017-01-01 RX ADMIN — FUROSEMIDE 10 MG: 10 INJECTION, SOLUTION INTRAVENOUS at 04:36

## 2017-01-01 RX ADMIN — LIDOCAINE 1 PATCH: 50 PATCH CUTANEOUS at 20:18

## 2017-01-01 RX ADMIN — DEXTROSE 1 MG/MIN: 5 SOLUTION INTRAVENOUS at 01:09

## 2017-01-01 RX ADMIN — ROCURONIUM BROMIDE 50 MG: 10 INJECTION INTRAVENOUS at 06:09

## 2017-01-01 RX ADMIN — ONDANSETRON 4 MG: 2 INJECTION INTRAMUSCULAR; INTRAVENOUS at 02:34

## 2017-01-01 RX ADMIN — FUROSEMIDE 20 MG: 10 INJECTION INTRAMUSCULAR; INTRAVENOUS at 08:30

## 2017-01-01 RX ADMIN — INSULIN ASPART 1 UNITS: 100 INJECTION, SOLUTION INTRAVENOUS; SUBCUTANEOUS at 01:20

## 2017-01-01 RX ADMIN — MORPHINE SULFATE 2 MG: 2 INJECTION, SOLUTION INTRAMUSCULAR; INTRAVENOUS at 04:12

## 2017-01-01 RX ADMIN — INSULIN ASPART 1 UNITS: 100 INJECTION, SOLUTION INTRAVENOUS; SUBCUTANEOUS at 08:11

## 2017-01-01 RX ADMIN — ACETYLCYSTEINE 2 ML: 200 SOLUTION ORAL; RESPIRATORY (INHALATION) at 13:20

## 2017-01-01 RX ADMIN — METOPROLOL TARTRATE 5 MG: 5 INJECTION, SOLUTION INTRAVENOUS at 11:19

## 2017-01-01 RX ADMIN — Medication 0.2 MG: at 17:45

## 2017-01-01 RX ADMIN — METRONIDAZOLE 500 MG: 500 INJECTION, SOLUTION INTRAVENOUS at 20:04

## 2017-01-01 RX ADMIN — ERTAPENEM SODIUM 1 G: 1 INJECTION, POWDER, LYOPHILIZED, FOR SOLUTION INTRAMUSCULAR; INTRAVENOUS at 12:06

## 2017-01-01 RX ADMIN — SODIUM CHLORIDE, PRESERVATIVE FREE 5 ML: 5 INJECTION INTRAVENOUS at 09:51

## 2017-01-01 RX ADMIN — SODIUM CHLORIDE, POTASSIUM CHLORIDE, SODIUM LACTATE AND CALCIUM CHLORIDE 500 ML: 600; 310; 30; 20 INJECTION, SOLUTION INTRAVENOUS at 11:02

## 2017-01-01 RX ADMIN — Medication 0.2 MG: at 12:55

## 2017-01-01 RX ADMIN — PROPOFOL 100 MG: 10 INJECTION, EMULSION INTRAVENOUS at 06:09

## 2017-01-01 RX ADMIN — INSULIN ASPART 1 UNITS: 100 INJECTION, SOLUTION INTRAVENOUS; SUBCUTANEOUS at 08:00

## 2017-01-01 RX ADMIN — FUROSEMIDE 20 MG: 10 INJECTION, SOLUTION INTRAVENOUS at 08:30

## 2017-01-01 RX ADMIN — METOPROLOL TARTRATE 5 MG: 5 INJECTION, SOLUTION INTRAVENOUS at 23:33

## 2017-01-01 RX ADMIN — I.V. FAT EMULSION 250 ML: 20 EMULSION INTRAVENOUS at 20:14

## 2017-01-01 RX ADMIN — METOPROLOL TARTRATE 5 MG: 5 INJECTION, SOLUTION INTRAVENOUS at 12:22

## 2017-01-01 RX ADMIN — INSULIN ASPART 1 UNITS: 100 INJECTION, SOLUTION INTRAVENOUS; SUBCUTANEOUS at 08:17

## 2017-01-01 RX ADMIN — INSULIN ASPART 1 UNITS: 100 INJECTION, SOLUTION INTRAVENOUS; SUBCUTANEOUS at 00:06

## 2017-01-01 RX ADMIN — INSULIN ASPART 1 UNITS: 100 INJECTION, SOLUTION INTRAVENOUS; SUBCUTANEOUS at 13:08

## 2017-01-01 RX ADMIN — HEPARIN SODIUM 5000 UNITS: 5000 INJECTION, SOLUTION INTRAVENOUS; SUBCUTANEOUS at 05:47

## 2017-01-01 RX ADMIN — POTASSIUM CHLORIDE 20 MEQ: 29.8 INJECTION, SOLUTION INTRAVENOUS at 11:55

## 2017-01-01 RX ADMIN — INSULIN ASPART 1 UNITS: 100 INJECTION, SOLUTION INTRAVENOUS; SUBCUTANEOUS at 12:13

## 2017-01-01 RX ADMIN — I.V. FAT EMULSION 250 ML: 20 EMULSION INTRAVENOUS at 20:49

## 2017-01-01 RX ADMIN — INSULIN ASPART 1 UNITS: 100 INJECTION, SOLUTION INTRAVENOUS; SUBCUTANEOUS at 12:18

## 2017-01-01 RX ADMIN — IPRATROPIUM BROMIDE AND ALBUTEROL SULFATE 3 ML: .5; 3 SOLUTION RESPIRATORY (INHALATION) at 20:47

## 2017-01-01 RX ADMIN — METRONIDAZOLE 500 MG: 500 INJECTION, SOLUTION INTRAVENOUS at 12:26

## 2017-01-01 RX ADMIN — POTASSIUM CHLORIDE 10 MEQ: 14.9 INJECTION, SOLUTION, CONCENTRATE PARENTERAL at 06:38

## 2017-01-01 RX ADMIN — METOPROLOL TARTRATE 5 MG: 5 INJECTION, SOLUTION INTRAVENOUS at 18:09

## 2017-01-01 RX ADMIN — CALCIUM GLUCONATE: 94 INJECTION, SOLUTION INTRAVENOUS at 20:07

## 2017-01-01 RX ADMIN — HEPARIN SODIUM 5000 UNITS: 5000 INJECTION, SOLUTION INTRAVENOUS; SUBCUTANEOUS at 08:53

## 2017-01-01 RX ADMIN — ALBUMIN (HUMAN) 12.5 G: 12.5 SOLUTION INTRAVENOUS at 16:24

## 2017-01-01 RX ADMIN — ALBUTEROL SULFATE 2.5 MG: 2.5 SOLUTION RESPIRATORY (INHALATION) at 09:35

## 2017-01-01 RX ADMIN — DEXTROSE AND SODIUM CHLORIDE: 5; 900 INJECTION, SOLUTION INTRAVENOUS at 11:47

## 2017-01-01 RX ADMIN — MORPHINE SULFATE 2 MG: 2 INJECTION, SOLUTION INTRAMUSCULAR; INTRAVENOUS at 19:02

## 2017-01-01 RX ADMIN — MORPHINE SULFATE 2 MG: 2 INJECTION, SOLUTION INTRAMUSCULAR; INTRAVENOUS at 09:59

## 2017-01-01 RX ADMIN — INSULIN ASPART 1 UNITS: 100 INJECTION, SOLUTION INTRAVENOUS; SUBCUTANEOUS at 19:51

## 2017-01-01 RX ADMIN — METOPROLOL TARTRATE 5 MG: 5 INJECTION, SOLUTION INTRAVENOUS at 14:17

## 2017-01-01 RX ADMIN — POTASSIUM CHLORIDE 10 MEQ: 14.9 INJECTION, SOLUTION, CONCENTRATE PARENTERAL at 09:20

## 2017-01-01 RX ADMIN — I.V. FAT EMULSION 250 ML: 20 EMULSION INTRAVENOUS at 20:19

## 2017-01-01 ASSESSMENT — PAIN DESCRIPTION - DESCRIPTORS
DESCRIPTORS: ACHING
DESCRIPTORS: ACHING;SORE
DESCRIPTORS: ACHING
DESCRIPTORS: ACHING;TENDER
DESCRIPTORS: ACHING;TENDER
DESCRIPTORS: ACHING
DESCRIPTORS: ACHING;SORE
DESCRIPTORS: ACHING
DESCRIPTORS: ACHING
DESCRIPTORS: ACHING;TENDER
DESCRIPTORS: ACHING
DESCRIPTORS: ACHING;TENDER
DESCRIPTORS: ACHING;TENDER
DESCRIPTORS: ACHING;SORE
DESCRIPTORS: ACHING

## 2017-02-03 NOTE — NURSING NOTE
"Chief Complaint   Patient presents with     Ear Problem       Initial /60 mmHg  Pulse 92  Temp(Src) 98  F (36.7  C) (Oral)  Resp 16  Ht 5' 3.75\" (1.619 m)  Wt 137 lb 2 oz (62.199 kg)  BMI 23.73 kg/m2  SpO2 97%  LMP  (LMP Unknown)  Breastfeeding? No Estimated body mass index is 23.73 kg/(m^2) as calculated from the following:    Height as of this encounter: 5' 3.75\" (1.619 m).    Weight as of this encounter: 137 lb 2 oz (62.199 kg).  BP completed using cuff size: regular  Ciera Fox MA      "

## 2017-02-03 NOTE — PROGRESS NOTES
"  SUBJECTIVE:                                                    Adia Del Valle is a 89 year old female who presents to clinic today for the following health issues:    1. Patient stated that she was seen at a hearing place and was told by them that she need both ear wash.   Adia Del Valle is a 89 year old female  who presents for evaluation of cerumen impaction symptoms.  Has a plugged sensation in on both sides ear(s) for several  weeks Mild to moderate pressue without acute pain present and no discharge from canal.  Slight decrease in hearing  present.  No recent symptoms of URI or other infection.   Past history of similar problems  Present, last irrigated in 3/2015.   Risk factors for cerumen impaction include: hearing aid use.    Prevention and treatment options reviewed.      Past medical histories reviewed and updated in Epic.     O:  /60 mmHg  Pulse 92  Temp(Src) 98  F (36.7  C) (Oral)  Resp 16  Ht 5' 3.75\" (1.619 m)  Wt 137 lb 2 oz (62.199 kg)  BMI 23.73 kg/m2  SpO2 97%  LMP  (LMP Unknown)  Breastfeeding? No  No discharge from canals present.  Cerumen impaction present on both sides.  Wax removed with ear wash per nursing staff.  After irrigation Bilateral canals patent, TM's pearly grey and intact.     (H61.23) Bilateral impacted cerumen  (primary encounter diagnosis)  Comment:   Plan:       Shala Santillan RN, CNP      "

## 2017-03-14 NOTE — TELEPHONE ENCOUNTER
She has had different epidural injections different locations and approaches - one at L2-3 (interlaminar), one at L5-S1 right transforminal.  I would not know what/how to order and am not sure if they would require a more recent MRI.  She should call Dr. Rose's office and see if he can order or if they want to see her first.

## 2017-03-14 NOTE — TELEPHONE ENCOUNTER
Pt insurance is [employer]. Therefore, it is open access and pt does not need an insurance referral. Pt needs an Order from Heide Murphy to receive shot for spinal stenosis [facility].     Toya Storm, Olla Referral Rep

## 2017-03-14 NOTE — TELEPHONE ENCOUNTER
Called pateint and as it turn s out she is seeing [facility] here at  on Thursday this week and will ask them about the spinal injections and a treatment plan.    Today she has asked us to send her [facility] to [facility] as it is more cost effective.  This has been done.  Anastasia Padilla RN

## 2017-03-14 NOTE — TELEPHONE ENCOUNTER
SNEHA review:  Are you willing to order this spinal injection? Or should she get the orders through Dr. Rose from  Ortho whom she saw 8/ 2015.  She has had injections in the past but not sure where this was.     Orders are in place for sign off if you agree.   Anastasia Padilla RN

## 2017-03-14 NOTE — TELEPHONE ENCOUNTER
Reason for Call: Request for an order or referral:    Order or referral being requested: Pt is calling requesting an order to receive a shot it her back for spinal stenosis. She would like to get it done at [facility] ph: [phone]. Pt stated she is in a lot of pain and would like to get this done ASAP.     Date needed: as soon as possible    Has the patient been seen by the PCP for this problem? YES    Additional comments: please call pt when orders have been faxed so she can make an appt.    Phone number Patient can be reached at:  Home number on file [phone] (home)    Best Time:  anytime    Can we leave a detailed message on this number?  YES    Call taken on 3/14/2017 at 9:49 AM by Belen Nieto

## 2017-03-14 NOTE — TELEPHONE ENCOUNTER
Routing to referral pool.    Please review and advise if referral/order can be given for [facility].    Thank you!  RUSSELL ClemensN, RN

## 2017-05-15 NOTE — TELEPHONE ENCOUNTER
Medication Detail      Disp Refills Start End IMAN   hydrochlorothiazide (HYDRODIURIL) 25 MG tablet 45 tablet 1 3/14/2017  No   Sig: Take 0.5 tablets (12.5 mg) by mouth daily       Last Office Visit with G, P or [facility] prescribing provider: 2/3/17       Potassium   Date Value Ref Range Status   05/09/2016 3.5 3.4 - 5.3 mmol/L Final     Creatinine   Date Value Ref Range Status   05/09/2016 0.74 0.52 - 1.04 mg/dL Final     BP Readings from Last 3 Encounters:   02/03/17 110/60   09/06/16 106/69   05/09/16 116/57

## 2017-05-16 NOTE — TELEPHONE ENCOUNTER
Routing refill request to provider for review/approval because:  Sari given x1 and patient did not follow up, please advise

## 2017-06-12 NOTE — TELEPHONE ENCOUNTER
"Patient states she began having sharp abdominal pains last weekend  Took Miralax, tums and ex-lax and then yesterday had \"quite a few BMs\"  Has had a decreased appetite  Has appointment scheduled for this afternoon and is wanting x-rays to see if she has a blockage.  Declines ER evaluation and just wants to keep appointment with BIPIN Murphy for this afternoon.  Salma Brown RN  "

## 2017-06-12 NOTE — PROGRESS NOTES
SUBJECTIVE:  Adia Del Valle, a 89 year old female scheduled an appointment to discuss the following issues:     Abdominal pain, generalized  Constipation, unspecified constipation type  Hypertension goal BP (blood pressure) < 140/90  Hyperlipidemia LDL goal <130    See scanned down time sheet  Medical, social, surgical, and family histories reviewed.    ROS:  C: NEGATIVE for fever, chills  R: NEGATIVE for significant cough or SOB  CV: NEGATIVE for chest pain, palpitations   GI: see HPI  MUSCULOSKELETAL:chronic back and leg pain  N: NEGATIVE for weakness, dizziness or paresthesias or headache    OBJECTIVE:  /64  Pulse 82  Temp 98.3  F (36.8  C) (Oral)  Resp 18  Wt 128 lb 2 oz (58.1 kg)  LMP  (LMP Unknown)  SpO2 98%  BMI 22.17 kg/m2  EXAM:  See scanned form    ASSESSMENT/PLAN:  (R10.84) Abdominal pain, generalized  (primary encounter diagnosis)  Comment: likely due to constipation; xray shows large amount of stool  Plan: XR Abdomen 2 Views, Comprehensive metabolic         panel, CBC with platelets differential          Patient Instructions   Take Miralax (one capful) twice daily until stools are soft and regular, then decrease to once daily.  Take Milk of Magnesia if needed (take in place of Ex-Lax if needed).  Increase fiber (raw fruit and veggies, bran, whole grains, Fiber One cereal).  Drink plenty of fluids.        Follow up if no improvement over the next 1-2 weeks, sooner if symptoms worsen.     (K59.00) Constipation, unspecified constipation type  Comment:   Plan: See above.     (I10) Hypertension goal BP (blood pressure) < 140/90  Comment: at goal  Plan: Comprehensive metabolic panel        The current medical regimen is effective;  continue present plan and medications.     (E78.5) Hyperlipidemia LDL goal <130  Comment:   Plan: Lipid Profile with reflex to direct LDL

## 2017-06-12 NOTE — PATIENT INSTRUCTIONS
Take Miralax (one capful) twice daily until stools are soft and regular, then decrease to once daily.  Take Milk of Magnesia if needed (take in place of Ex-Lax if needed).  Increase fiber (raw fruit and veggies, bran, whole grains, Fiber One cereal).  Drink plenty of fluids.

## 2017-06-12 NOTE — NURSING NOTE
"Chief Complaint   Patient presents with     Abdominal Pain       Initial /64  Pulse 82  Temp 98.3  F (36.8  C) (Oral)  Resp 18  Wt 128 lb 2 oz (58.1 kg)  LMP  (LMP Unknown)  SpO2 98%  BMI 22.17 kg/m2 Estimated body mass index is 22.17 kg/(m^2) as calculated from the following:    Height as of 2/3/17: 5' 3.75\" (1.619 m).    Weight as of this encounter: 128 lb 2 oz (58.1 kg).  Medication Reconciliation: complete     Andreea Cornelius MA      "

## 2017-06-12 NOTE — MR AVS SNAPSHOT
"              After Visit Summary   6/12/2017    Adia Del Valle    MRN: [id number]           Patient Information     Date Of Birth          5/26/1924        Visit Information        Provider Department      6/12/2017 2:15 PM Heide Murphy NP [facility]        Today's Diagnoses     Abdominal pain, generalized    -  1    Constipation, unspecified constipation type        Hypertension goal BP (blood pressure) < 140/90        Hyperlipidemia LDL goal <130          Care Instructions    Take Miralax (one capful) twice daily until stools are soft and regular, then decrease to once daily.  Take Milk of Magnesia if needed (take in place of Ex-Lax if needed).  Increase fiber (raw fruit and veggies, bran, whole grains, Fiber One cereal).  Drink plenty of fluids.               Follow-ups after your visit        Who to contact     If you have questions or need follow up information about today's clinic visit or your schedule please contact [facility] directly at [phone].  Normal or non-critical lab and imaging results will be communicated to you by [employer], letter or phone within 4 business days after the clinic has received the results. If you do not hear from us within 7 days, please contact the clinic through Schedule C System[employer] or phone. If you have a critical or abnormal lab result, we will notify you by phone as soon as possible.  Submit refill requests through [employer] or call your pharmacy and they will forward the refill request to us. Please allow 3 business days for your refill to be completed.          Additional Information About Your Visit        [employer] Information     [employer] lets you send messages to your doctor, view your test results, renew your prescriptions, schedule appointments and more. To sign up, go to [email or web address] . Click on \"Log in\" on the left side of the screen, which will take you to the Welcome page. Then click on \"Sign up Now\" on the right side of " the page.     You will be asked to enter the access code listed below, as well as some personal information. Please follow the directions to create your username and password.     Your access code is: [email or web address]  Expires: 9/10/2017  3:37 PM     Your access code will  in 90 days. If you need help or a new code, please call your [place] clinic or [phone].        Care EveryWhere ID     This is your Care EveryWhere ID. This could be used by other organizations to access your [place] medical records  [id number]        Your Vitals Were     Last Period                   (LMP Unknown)            Blood Pressure from Last 3 Encounters:   17 110/60   16 106/69   16 116/57    Weight from Last 3 Encounters:   17 137 lb 2 oz (62.2 kg)   16 138 lb (62.6 kg)   16 143 lb (64.9 kg)              We Performed the Following     CBC with platelets differential     Comprehensive metabolic panel     Lipid Profile with reflex to direct LDL        Primary Care Provider Office Phone # Fax #    Heide Murphy [phone]       [facility] [address] [facility] [address]        Thank you!     Thank you for choosing [facility]  for your care. Our goal is always to provide you with excellent care. Hearing back from our patients is one way we can continue to improve our services. Please take a few minutes to complete the written survey that you may receive in the mail after your visit with us. Thank you!             Your Updated Medication List - Protect others around you: Learn how to safely use, store and throw away your medicines at www.disposemymeds.org.          This list is accurate as of: 17  3:37 PM.  Always use your most recent med list.                   Brand Name Dispense Instructions for use    ALEVE PO      Take by mouth 2 times daily       aspirin 81 MG tablet     100    1 tab po QD (Once per day)        calcium-vitamin D 600-400 MG-UNIT per tablet    CALTRATE     Take 1 tablet by mouth daily       CO Q 10 PO      Take by mouth daily       EYE DROPS OP      EYE DROPS       * hydrochlorothiazide 25 MG tablet    HYDRODIURIL    45 tablet    Take 0.5 tablets (12.5 mg) by mouth daily       * hydrochlorothiazide 25 MG tablet    HYDRODIURIL    45 tablet    TAKE 1/2 TABLET(12.5 MG) BY MOUTH DAILY       polyethylene glycol powder    MIRALAX/GLYCOLAX         PRESERVISION AREDS 2 Caps     60 capsule    Take 2 capsules by mouth daily       saccharomyces boulardii 250 MG capsule    FLORASTOR     Take 100 mg by mouth       UNABLE TO FIND      MEDICATION NAME: Preservision 2 times daily       * Notice:  This list has 2 medication(s) that are the same as other medications prescribed for you. Read the directions carefully, and ask your doctor or other care provider to review them with you.

## 2017-06-18 PROBLEM — K26.5 PERFORATED DUODENAL ULCER (H): Status: ACTIVE | Noted: 2017-01-01

## 2017-06-18 NOTE — BRIEF OP NOTE
[facility] [place]    Brief Operative Note    Pre-operative diagnosis: Peforated Gastric Ulcer  Post-operative diagnosis Same   Procedure: Procedure(s):  Laparoscopic Exploration of Abdomen, Upper Endoscopy, Closure of duodenal Ulcer, Modified Juan J Patch  - Wound Class: II-Clean Contaminated   - Wound Class: I-Clean    Surgeon: Surgeon(s) and Role:     * Rolly Fletcher MD - Primary     * Reagan Marshall MD - Resident - Assisting     * Deana Wong MD - Resident - Assisting     * Michael Perez MD - Resident - Observing  Anesthesia: General   Estimated blood loss: Less than 10 ml  Drains: None  Specimens:   ID Type Source Tests Collected by Time Destination   A : celiac lymph node Tissue Other SURGICAL PATHOLOGY EXAM Rolly Fletcher MD 6/18/2017  7:33 AM      Findings:   Perforated duodenal ulcer with gastric contents in peritoneum, repaired with modified juan j patch, abdomen extensively irrigated with 10L+ NS  Complications: None.  Implants: None.

## 2017-06-18 NOTE — OR NURSING
If nephearl Del Valle is not immediately available patient has friend-Fallon ReeseMaxpf-[phone]-who can be contacted if needed per patient permission and request.  HELGA Pace RN

## 2017-06-18 NOTE — ED NOTES
Bed: ED20  Expected date: 6/18/17  Expected time: 12:03 AM  Means of arrival: Ambulance  Comments:  87 F  abd pain

## 2017-06-18 NOTE — ANESTHESIA POSTPROCEDURE EVALUATION
Patient: Adia Del Valle    Procedure(s):  Laparoscopic Exploration of Abdomen, Upper Endoscopy, Closure of duodenal Ulcer, Modified Juan J Patch  - Wound Class: II-Clean Contaminated   - Wound Class: I-Clean      Diagnosis:Peforated Gastric Ulcer  Diagnosis Additional Information: No value filed.    Anesthesia Type:  General, RSI    Note:  Anesthesia Post Evaluation    Patient location during evaluation: PACU  Patient participation: Unable to participate in evaluation secondary to age  Level of consciousness: awake  Pain management: adequate  Airway patency: patent  Cardiovascular status: acceptable  Respiratory status: acceptable  Hydration status: acceptable  PONV: none             Last vitals:  Vitals:    06/18/17 1145 06/18/17 1200 06/18/17 1215   BP: 111/62 125/74 138/81   Resp: 16 16 16   Temp:   36.3  C (97.4  F)   SpO2: 100% 96% 95%         Electronically Signed By: Sidney Feliz MD  June 18, 2017  12:41 PM

## 2017-06-18 NOTE — ANESTHESIA CARE TRANSFER NOTE
Patient: Adia Del Valle    Procedure(s):  Laparoscopic Exploration of Abdomen, Upper Endoscopy, Closure of duodenal Ulcer, Modified Juan J Patch  - Wound Class: II-Clean Contaminated   - Wound Class: I-Clean      Diagnosis: Peforated Gastric Ulcer  Diagnosis Additional Information: No value filed.    Anesthesia Type:   General, RSI     Note:  Airway :Face Mask  Patient transferred to:PACU  Comments: Extubated in the operating room with the patient showing responsiveness to commands and good strength. Transferred to pacu in stable condition. Patient complained of pain in the pacu, given 50mcg fentanyl at bedside and re-assessed 15 min later. Respirations stable and pain better controlled. Sign out given to staff.    Enzo Spencer MD  CA1  [id number]      Vitals: (Last set prior to Anesthesia Care Transfer)    CRNA VITALS  6/18/2017 1010 - 6/18/2017 1110      6/18/2017             NIBP: 157/83    Pulse: 79    NIBP Mean: 103    Ht Rate: 76    SpO2: 100 %    Resp Rate (observed): (!)  5                Electronically Signed By: Enzo Spencer MD  June 18, 2017  11:12 AM

## 2017-06-18 NOTE — H&P
"  General Surgery Admission History and Physical     Adia Del Valle MRN# [id number]   YOB: 1924 Age: 89 year old      Date of Admission:  6/18/2017    CC: Perforated viscus    Assessment: Adia Del Valle is a(n) 89 year old year old female with PMH of hypertension and hyperlipidemia who presents with perforated viscus, likely gastric, seen on CT. She is currently hemodynamically stable.    Plan:  - IVF resuscitation  - Correct hypokalemia  - Pre op EKG, CXR and T&C obtained  - Consent obtained  - OR for diagnostic laparoscopy possible exploratory laparotomy  - Plan for SICU admission post op    Discussed with Dr. Fletcher, general surgery staff.  -------------------    HPI: Adia Del Valle is a(n) 89 year old year old female who presents as a transfer from [place] for perforated viscus. She complains of 3 hours of abdominal pain prior to presentation. PMH significant for a history of hypertension, hyperlipidemia, spinal stenosis, and cholecystectomy in 2007. Patient states that she has had abdominal pain over the past 2-3 months. She never had abdominal pain issues prior to that. Her abdominal pain became severe and came to the ED via ambulance. Here the pain has subsided a little bit. She describes it as \"gas pains\" that radiate to her back. She takes aleve regularly. She has never been on an acid reducing medication. Her last bowel movement was a runny stool yesterday, none since. She denies dysuria, hematuria, or bloody stools.     ROS:  The remainder of the complete ROS was negative unless noted in the HPI.    Past Medical History:  Past Medical History:   Diagnosis Date     Cardiomegaly      Cholecystitis, unspecified      Disorder of bone and cartilage, unspecified      Generalized osteoarthrosis, unspecified site      Lump or mass in breast      Pure hypercholesterolemia      Unspecified essential hypertension      Unspecified venous (peripheral) insufficiency        Past Surgical " History:  Past Surgical History:   Procedure Laterality Date     CATARACT IOL, RT/LT       CHOLECYSTECTOMY, LAPOROSCOPIC  8/07    Cholecystectomy, Laparoscopic     COLONOSCOPY       ENDOSCOPIC RELEASE CARPAL TUNNEL  4/25/2012    Procedure:ENDOSCOPIC RELEASE CARPAL TUNNEL; RIGHT ENDOSCOPIC CARPAL TUNNEL RELEASE; Surgeon:SERG PERKINS; Location:[facility]     PHACOEMULSIFICATION CLEAR CORNEA WITH STANDARD INTRAOCULAR LENS IMPLANT  5/23/2013    Procedure: PHACOEMULSIFICATION CLEAR CORNEA WITH STANDARD INTRAOCULAR LENS IMPLANT;  RIGHT PHACOEMULSIFICATION CLEAR CORNEA WITH STANDARD INTRAOCULAR LENS IMPLANT ;  Surgeon: Vidal Heredia MD;  Location: [facility]     SURGICAL HISTORY OF -       left breast biopsy     SURGICAL HISTORY OF -       left hammertoe and bunionectomy     SURGICAL HISTORY OF -   2008    cataract extraction     SURGICAL HISTORY OF -   2009    left carpal tunnel release     TONSILLECTOMY         Allergies:     Allergies   Allergen Reactions     Codeine Sulfate      Dizzy, nauseated     Tramadol Other (See Comments)     dizzy       Medications:    No current facility-administered medications on file prior to encounter.   Current Outpatient Prescriptions on File Prior to Encounter:  hydrochlorothiazide (HYDRODIURIL) 25 MG tablet TAKE 1/2 TABLET(12.5 MG) BY MOUTH DAILY   hydrochlorothiazide (HYDRODIURIL) 25 MG tablet Take 0.5 tablets (12.5 mg) by mouth daily   Naproxen Sodium (ALEVE PO) Take by mouth 2 times daily   saccharomyces boulardii (FLORASTOR) 250 MG capsule Take 100 mg by mouth   polyethylene glycol (MIRALAX/GLYCOLAX) powder    Multiple Vitamins-Minerals (PRESERVISION AREDS 2) CAPS Take 2 capsules by mouth daily   Coenzyme Q10 (CO Q 10 PO) Take by mouth daily   Tetrahydrozoline HCl (EYE DROPS OP) EYE DROPS   calcium-vitamin D (CALTRATE) 600-400 MG-UNIT per tablet Take 1 tablet by mouth daily   ASPIRIN 81 MG OR TABS 1 tab po QD (Once per day)   UNABLE TO FIND MEDICATION NAME: Preservision  2 times daily       Social History:  Social History   Substance Use Topics     Smoking status: Never Smoker     Smokeless tobacco: Never Used     Alcohol use No      Denies tobacco, alcohol and illicit drug use    Family History:  Family History   Problem Relation Age of Onset     Arthritis Mother      Respiratory Father      emphysema-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 No family hx of      DIABETES No family hx of      Hypertension No family hx of      CEREBROVASCULAR DISEASE No family hx of      Breast Cancer No family hx of      Cancer - colorectal No family hx of      Prostate Cancer No family hx of        Exam:  /63  Temp 97.4  F (36.3  C) (Oral)  Resp 24  Wt 58 kg (127 lb 13.9 oz)  LMP  (LMP Unknown)  SpO2 93%  BMI 22.12 kg/m2  General: Alert, interactive, & in NAD  Resp: Non-labored breathing  Cardiac: Regular rate, extremities warm  Abdomen: tense, peritonitic, tender to palpation throughout, guarding  Extremities: No LE edema  Skin: Warm and dry, no jaundice or rash    Labs:  WBC 8.2  Hgb 14.2  plt 296  K 2.9  BUN 35  Cr 0.69  Lactate 3.0  Lipase 1224    Imaging:   Recent Results (from the past 24 hour(s))   CT Abdomen Pelvis w Contrast    Narrative    CT ABDOMEN PELVIS W CONTRAST 6/18/2017 2:18 AM    HISTORY: Left-sided abdominal pain.    COMPARISON: None.    TECHNIQUE:  Abdomen and pelvis CT was performed following intravenous  administration of 65 cc Isovue-370.  Radiation dose for this scan was  reduced using automated exposure control, adjustment of the mA and/or  kV according to patient size, or iterative reconstruction technique.    FINDINGS: Basilar scarring. No pleural effusion.    Large amount of free intraperitoneal air. The wall of the stomach is  indistinct and the appearance is suspicious for a ruptured gastric  ulcer. There are moderately dilated loops of inflamed appearing small  bowel in the left hemiabdomen associated with  some free fluid.  Remainder of the small and large bowel has normal caliber. No  loculated collection to suggest abscess.    Intrahepatic biliary dilatation. Cholecystectomy clips. Spleen,  pancreas, adrenal glands and kidneys are unremarkable.    Multiple uterine fibroids. Urinary bladder is distended with normal  wall thickness. Small amount of free fluid in the dependent pelvis.    Dense atherosclerotic vascular calcification without evidence of  aneurysm.      Impression    IMPRESSION: Large amount of free intraperitoneal air. Indistinct  gastric wall suggests the possibility of a ruptured gastric ulcer.  Moderate free fluid in the abdomen without abscess.    GONZALEZ VEGA MD     CXR:   IMPRESSION: Cardiomegaly without acute pulmonary abnormality.    EKG:  ST depressions and T wave inversions in the inferior lateral leads    Deana Wong MD  General Surgery, PGY-1  Pg [phone]  3:23 AM, 6/18/2017

## 2017-06-18 NOTE — PROGRESS NOTES
Pt arrived on 4A from PACU around 1300. Vitals stable. 2 L NC; oxygenating well. Pt complains of abdominal pain around the incision sites. Treated with IV dilaudid. Two Abdominal YOSELYN drains. Drain #1 bile/brown drainage. Drain #2 serosang. Family visited and updated on pt's status. Urine output 35-45ml/hr.    Plan: Continue to monitor. Notify SICU of any changes.

## 2017-06-18 NOTE — ANESTHESIA PREPROCEDURE EVALUATION
Anesthesia Evaluation     .             ROS/MED HX    ENT/Pulmonary:       Neurologic:       Cardiovascular:     (+) hypertension----. : . . . :. .       METS/Exercise Tolerance:     Hematologic:         Musculoskeletal:         GI/Hepatic:         Renal/Genitourinary:         Endo:         Psychiatric:         Infectious Disease:         Malignancy:         Other:                      Allergies   Allergen Reactions     Codeine Sulfate      Dizzy, nauseated     Tramadol Other (See Comments)     dizzy     Prescription Medications as of 6/18/2017             hydrochlorothiazide (HYDRODIURIL) 25 MG tablet TAKE 1/2 TABLET(12.5 MG) BY MOUTH DAILY    hydrochlorothiazide (HYDRODIURIL) 25 MG tablet Take 0.5 tablets (12.5 mg) by mouth daily    Naproxen Sodium (ALEVE PO) Take by mouth 2 times daily    saccharomyces boulardii (FLORASTOR) 250 MG capsule Take 100 mg by mouth    polyethylene glycol (MIRALAX/GLYCOLAX) powder     Multiple Vitamins-Minerals (PRESERVISION AREDS 2) CAPS Take 2 capsules by mouth daily    Coenzyme Q10 (CO Q 10 PO) Take by mouth daily    Tetrahydrozoline HCl (EYE DROPS OP) EYE DROPS    calcium-vitamin D (CALTRATE) 600-400 MG-UNIT per tablet Take 1 tablet by mouth daily    ASPIRIN 81 MG OR TABS 1 tab po QD (Once per day)    UNABLE TO FIND MEDICATION NAME: Preservision 2 times daily      Facility Administered Medications as of 6/18/2017             iopamidol (ISOVUE-370) solution 100 mL Inject 100 mLs into the vein once    lactated ringers BOLUS 1,000 mL Inject 1,000 mLs into the vein once    morphine (PF) injection 2 mg (Auto Hold) ((Auto Hold) since 6/18/2017  5:23 AM) Inject 2 mg into the vein every hour as needed for moderate to severe pain    ondansetron (ZOFRAN) injection 4 mg Inject 2 mLs (4 mg) into the vein once    piperacillin-tazobactam (ZOSYN) 3.375 g vial to attach to  mL bag Inject 3.375 g into the vein once    lactated ringers infusion Inject into the vein continuous    potassium  chloride 10 mEq in 100 mL intermittent infusion with 10 mg lidocaine (Auto Hold) ((Auto Hold) since 6/18/2017  5:23 AM) Inject 100 mLs (10 mEq) into the vein once    ceFAZolin sodium-dextrose (ANCEF) infusion 2 g Inject 100 mLs (2 g) into the vein Pre-Op/Pre-procedure x 1 dose    ceFAZolin (ANCEF) 1 g vial to attach to  ml bag for ADULT or 50 ml bag for PEDS Inject 1 g into the vein See Admin Instructions    HYDROmorphone (DILAUDID) injection 0.2 mg Inject 0.2 mLs (0.2 mg) into the vein once      No results found for this or any previous visit (from the past 4320 hour(s)).  PAST MEDICAL HISTORY:   Past Medical History:   Diagnosis Date     Cardiomegaly      Cholecystitis, unspecified      Disorder of bone and cartilage, unspecified      Generalized osteoarthrosis, unspecified site      Lump or mass in breast      Pure hypercholesterolemia      Unspecified essential hypertension      Unspecified venous (peripheral) insufficiency        PAST SURGICAL HISTORY:   Past Surgical History:   Procedure Laterality Date     CATARACT IOL, RT/LT       CHOLECYSTECTOMY, LAPOROSCOPIC  8/07    Cholecystectomy, Laparoscopic     COLONOSCOPY       ENDOSCOPIC RELEASE CARPAL TUNNEL  4/25/2012    Procedure:ENDOSCOPIC RELEASE CARPAL TUNNEL; RIGHT ENDOSCOPIC CARPAL TUNNEL RELEASE; Surgeon:SERG PERKINS; Location:[facility]     PHACOEMULSIFICATION CLEAR CORNEA WITH STANDARD INTRAOCULAR LENS IMPLANT  5/23/2013    Procedure: PHACOEMULSIFICATION CLEAR CORNEA WITH STANDARD INTRAOCULAR LENS IMPLANT;  RIGHT PHACOEMULSIFICATION CLEAR CORNEA WITH STANDARD INTRAOCULAR LENS IMPLANT ;  Surgeon: Vidal Heredia MD;  Location: [facility]     SURGICAL HISTORY OF -       left breast biopsy     SURGICAL HISTORY OF -       left hammertoe and bunionectomy     SURGICAL HISTORY OF -   2008    cataract extraction     SURGICAL HISTORY OF -   2009    left carpal tunnel release     TONSILLECTOMY         FAMILY HISTORY:   Family History   Problem  Relation Age of Onset     Arthritis Mother      Respiratory Father      emphysema-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 No family hx of      DIABETES No family hx of      Hypertension No family hx of      CEREBROVASCULAR DISEASE No family hx of      Breast Cancer No family hx of      Cancer - colorectal No family hx of      Prostate Cancer No family hx of        SOCIAL HISTORY:   Social History   Substance Use Topics     Smoking status: Never Smoker     Smokeless tobacco: Never Used     Alcohol use No     Lab Results   Component Value Date    WBC 8.2 06/18/2017    HGB 14.2 06/18/2017    HCT 41.8 06/18/2017     06/18/2017    CHOL 301 (H) 06/12/2017    TRIG 193 (H) 06/12/2017    HDL 57 06/12/2017    ALT 15 06/18/2017    AST 20 06/18/2017     06/18/2017    BUN 35 (H) 06/18/2017    CO2 22 06/18/2017    TSH 1.95 01/19/2015    INR 1.09 06/18/2017     Prescriptions Prior to Admission   Medication Sig Dispense Refill Last Dose     hydrochlorothiazide (HYDRODIURIL) 25 MG tablet TAKE 1/2 TABLET(12.5 MG) BY MOUTH DAILY 45 tablet 0 6/17/2017 at Unknown time     hydrochlorothiazide (HYDRODIURIL) 25 MG tablet Take 0.5 tablets (12.5 mg) by mouth daily 45 tablet 1 6/17/2017 at Unknown time     Naproxen Sodium (ALEVE PO) Take by mouth 2 times daily   6/17/2017 at Unknown time     saccharomyces boulardii (FLORASTOR) 250 MG capsule Take 100 mg by mouth   6/17/2017 at Unknown time     polyethylene glycol (MIRALAX/GLYCOLAX) powder    6/17/2017 at Unknown time     Multiple Vitamins-Minerals (PRESERVISION AREDS 2) CAPS Take 2 capsules by mouth daily 60 capsule 0 6/17/2017 at Unknown time     Coenzyme Q10 (CO Q 10 PO) Take by mouth daily   6/17/2017 at Unknown time     Tetrahydrozoline HCl (EYE DROPS OP) EYE DROPS   6/17/2017 at Unknown time     calcium-vitamin D (CALTRATE) 600-400 MG-UNIT per tablet Take 1 tablet by mouth daily   6/17/2017 at Unknown time     ASPIRIN 81 MG OR  TABS 1 tab po QD (Once per day) 100 3 6/17/2017 at Unknown time     UNABLE TO FIND MEDICATION NAME: Preservision 2 times daily   Unknown at Unknown time                      Anesthesia Plan      History & Physical Review      ASA Status:  3 emergent.        Plan for General and RSI with Intravenous induction. Maintenance will be Balanced.      Additional equipment: 2nd IV      Postoperative Care      Consents  Anesthetic plan, risks, benefits and alternatives discussed with:  Patient..                          .

## 2017-06-18 NOTE — OR NURSING
Natalie Miranda CRNA aware patient did not receive potassium infusion in the [place] ED.  HELGA Pace RN

## 2017-06-19 NOTE — PROGRESS NOTES
"  Care Coordinator Progress Note     Admission Date/Time:  6/18/2017  Attending MD:  Rolly Fletcher MD     Data  Chart reviewed, discussed with interdisciplinary team.   Patient was admitted for: Perforated viscus.    Concerns with insurance coverage for discharge needs: None.  Current Living Situation: Patient lives alone.  Support System: Supportive  Services Involved: DME  Transportation: Family or Friend will provide  Barriers to Discharge: lives alone    Coordination of Care and Referrals: Provided patient/family with options for Home Care.   Pt is agreeable to home care after discharge.  She has not worked with PT/OT yet due to pain issues.  She is not interested in TCU placement.       Assessment  Met with pt today to review plan of discharge.  Pt updated me that she lives alone in a senior cooperative.  She described the cooperative at length, including the fact that she lives within walking distance to 4 restaurants, a grocery store, post office and shoe store. She uses a walker to ambulate and does have limited vision due to macular degeneration.  When asked if she has help if she needs it, pt described a system within her building where she must update her notification board every morning by 9 am that she is all right.  If the board is not moved, someone will call her.  If she does not answer, someone will go into her apartment to check on her.  She feels very safe with this system.  I updated her that PT/OT would be working with her and make recommendations for home with home care vs rehab.  Pt stated that she is not interested in rehab, that she \"has grit\" and if her pain is under control she will be working hard with therapies to discharge home with home care.  RNCC to follow for PT/OT recommendations and discharge planning.         Plan  Anticipated Discharge Date:  TBD    Anticipated Discharge Plan:  Home with home care services      Blair Pérez RN, BSN  ICU Care Coordinator  Pager: " [phone]  Phone:  [phone]

## 2017-06-19 NOTE — PLAN OF CARE
Problem: Goal Outcome Summary  Goal: Goal Outcome Summary  Outcome: Improving  D/I= 89 yo lady w/ repair of perforated duodenal ulcer yesterday.Skilled nursing assessments and adm of medications and prn analgesic. A/P=Is reluctant to take pain medication and does not understand why she might be having pain. Did receive relief w/ use of tylenol suppository. Urine output is low. Potassium is being repleted. Need to encourage pt to take pain medication.

## 2017-06-19 NOTE — PROGRESS NOTES
Surgery  06/19/2017     POD#1.   Complaining of abdominal pain overnight, hard to sleep.  Marginal UOP.  NAEO.    BP 97/52  Temp 98.7  F (37.1  C) (Oral)  Resp 12  Wt 60.2 kg (132 lb 11.5 oz)  LMP  (LMP Unknown)  SpO2 98%  BMI 22.96 kg/m2  NAD  Nonlabored breathing  Soft, appropriately tender, wound bed clean, dressing changed. YOSELYN drains in RUQ with bilious drainage.  Ext wwp    Labs reviewed  141 111 35 / 116  3.6 22 0.73 \  Mg 2.4  P 2.8  Lipase 330    WBC 11.5  Hgb 12.8  Plt 258    , 195 since MN  Medial drain 225  Lateral drain 203    A/P  89F with perforated duodenal ulcer s/p exploratory laparoscopy, primary duodenal ulcer repair, Juan J patch 6/18/17.     - NPO, mIVF  - Consider PICC/TPN, would not attempt NJ placement  - Cont cipro/flagyl as do not have adequate source control with bile leak. Hopeful for spontaneous seal with drains and NPO.  - PPx: PPI, PCDs, and heparin SQ  - Dispo: 6B     Seen with Dr. Marshall, chief resident.     Royer Linder MD  General Surgery PGY-2  Pager [phone]

## 2017-06-19 NOTE — OP NOTE
DATE OF PROCEDURE:  06/18/2017      PREOPERATIVE DIAGNOSIS:  Perforated peptic ulcer.      POSTOPERATIVE DIAGNOSIS:  Perforated duodenal ulcer.      PROCEDURE:  Laparoscopic exploration, closure of perforated duodenal ulcer and modified Juan J patch.  EGD was also performed.      STAFF:  Rolly Fletcher MD      RESIDENT:  Reagan Marshall MD.      HISTORY OF PRESENT ILLNESS:  Adia Del Valle is a 89-year-old female presented to the [facility] with severe abdominal pain associated with a CT scan of the abdomen that demonstrated possible perforation of peptic ulcer.  The patient has a history of nonsteroidal anti-inflammatory and aspirin use and she had a sudden onset of severe abdominal pain recently, although she has had upper abdominal pain for some time that was not as severe as this.  The patient understood the risks and benefits of surgery including potential injury to intraabdominal organs, possibility for infection, possibility of bleeding to the point at which transfusions would be required.  Patient also understood the potential that the perforated peptic ulcer would recur and the potential that hernia or other surgical complications would require a second surgery.  Patient understood the anesthesia risks including myocardial infarction, pulmonary emboli, stroke, even death in the operating room.  The patient understood these risks and wished to proceed.      DESCRIPTION OF PROCEDURE:  Adia Del Valle was taken to the main operating room.  She was intubated under general endotracheal anesthesia.  The patient was prepped and draped in sterile fashion.  Following the appropriate timeout procedure to assure patient identification and procedure, an approximately 1 cm incision was made in the periumbilical area with a #11 blade taken down to the level of the midline fascia with blunt dissection.  The fascia was brought up to the wound and incised.  The laparoscopic port was placed in an open  fashion without injury or difficulty.  Pneumoperitoneum was achieved.  The abdomen was covered with fibrinous material highly inflamed and an abundant amount of green thick fluid was present in the abdominal cavity.  At first, identification of the stomach, no specific perforation site could be identified.  Air was inserted in the NG tube by the anesthesia team and again no air was noted coming from the stomach, which was placed under sterile saline for this purpose.  Following this, a saturated solution of methylene blue was instilled into the stomach to identify the perforation site.  No leakage of blue dye was identified.  At this point, the remainder of the abdominal cavity was carefully inspected.  The lesser sac was entered through the gastrocolic ligament, which was taken down with Harmonic scalpel.  No blue dye was present in the lesser sac and there was not a large amount of purulent material in the lesser sac.  This was considered to be a negative exploration of the lesser sac.  The sigmoid colon, transverse colon and cecum were all carefully inspected for possible perforation or diverticulitis.  There is no evidence of this and during those inspections, the small bowel was carefully inspected and run for approximately 3/4 a distance of the small bowel carefully looking for possible Meckel's diverticulum that might be perforated.  None was found.  At this point, endoscopy of the upper abdomen was considered to be necessary.  EGD was performed.  The stomach was deeply stained with the methylene blue.  No perforation was identified within the stomach.  No other abnormalities were noted; however, at the pylorus, a marked stricture of the pylorus was noted and no blue dye transverse the pylorus into the duodenum.  On entry into the duodenum and instillation of air, it was noticed that a perforation site at the anterior first portion of the duodenum was present.  This was the site of perforation.  It was not  identified as there was gastric outlet obstruction and none of the air or blue dye entered into the duodenum to identify the perforation site at the anterior duodenum.  At this point, perforation site was then closed using EndoStitch 0-Ethibond suture.  A portion of omentum was created using the LigaSure device and this was brought up to the site of perforation and sutured in place in the modified Juan J patch.  Following this, the remainder of the abdomen was carefully inspected.  Hemostasis was excellent and all the periumbilical port site was closed with 0-PDS suture using the Leno-Sarah device.  The laparoscopic ports were removed.  A drain was placed into the abdomen through the most dependent 5 mm port site.  This drain was secured at the skin level using 3-0 nylon suture.  This specifically was a #19 round Gal drain.  At this point, the skin edges were then reapproximated.  The skin closure was applied and the patient tolerated the procedure well.  Needle and sponge count correct x2 and the patient was returned to postanesthesia recovery in good condition.         CHELITA VELIZ MD             D: 2017 19:10   T: 2017 21:44   MT: TD      Name:     SHANE LOCKE   MRN:      [id number]        Account:        [id number]   :      1924           Procedure Date: 2017      Document: [id number]

## 2017-06-19 NOTE — PLAN OF CARE
Problem: Goal Outcome Summary  Goal: Goal Outcome Summary  OT 4A: Cancel- Pt declines OT today d/t pain upon 2 attempts.

## 2017-06-20 NOTE — PLAN OF CARE
Problem: Goal Outcome Summary  Goal: Goal Outcome Summary  Outcome: Improving  D/I: Pt neuros intact, normal tensive was given bolus of 500ml NS, NSR with rare PAC to sinus tach when in pain, afebrile. Pt given a few doses of prn dilaudid. Capnography off early this evening, on RA at this time maintaining sats,  LS clear. Encouraged to use IS; tolerating well. Weak productive cough; had trouble coughing out phlegm. Very minimal urine output 5-15ml/hr, given a bolus of albumin 250ml with improvement of u/o; catheter was irrigated and flushed. Pt refused PT and OT today. Refused to let writer take out catheter and wants to wait until tomorrow when she feels stronger.     A/P: Pt has order to transfer to , continue to follow POC, encourage use of IS, manage pain, notify team for any changes or concern.

## 2017-06-20 NOTE — PLAN OF CARE
Problem: Goal Outcome Summary  Goal: Goal Outcome Summary  PT/6B- Evaluation completed and treatment initiated. Pt requires min to mod A x1-2 for transfers and bed mobility. Only tolerated brief stands this morning due to fatigue and pain, unable to encourage ambulation. Will assess gait at future sessions. Limited by stomach pain and fatigue. Currently recommend discharge to TCU as patient presents below baseline mobility and resides independent in own apartment without assist.

## 2017-06-20 NOTE — PROGRESS NOTES
Social Work: Assessment with Discharge Plan    Patient Name:  Adia Del Valle  :  1924  Age:  89 year old  MRN:  [id number]  Risk/Complexity Score:  Filed Complexity Screen Score: 4  Completed assessment with:  pt    Presenting Information   Reason for Referral:  Discharge plan and Potential need for community services upon discharge  Date of Intake:  2017  Referral Source:  Chart Review  Decision Maker:  Pt is her own decision maker.  Alternate Decision Maker:  Brothra Del Valle  Health Care Directive:  Provided education and Declined completing  Living Situation:  Pt lives alone in a handicap accessible apt at [facility] in [place].  Previous Functional Status:  Independent but pt uses a 4WW and cane for ambulation. Pt has restaurants, stores, post office, and public transportation within walking distance from her apt. Pt's co-op apt has a notification board where pt checks in by 9am otherwise someone is sent to check on her.  Cultural/Language/Spiritual Considerations:  English is primary language. Pt identifies as Mosque.    Physical Health  Reason for Admission:    1. Perforated viscus      Services Needed/Recommended:  TCU    Mental Health/Chemical Dependency  Diagnosis:  none  Support/Services in Place:  n/a  Services Needed/Recommended:  n/a    Support System  Significant relationship at present time:  none  Family of origin is available for support:  Yes  Nephew- Fco Del Valle (P: [phone], C: [phone])  Brother- Nilo Del Valle (P: [phone])  Other support available:  Friend- Fallon Reese (P: [phone])  Gaps in support system:  Pt lives alone.  Patient is caregiver to:  None     Provider Information   Primary Care Physician:  Heide Murphy   [phone]   Clinic:  [address] [facility]*      :  none    Financial   Income Source:  Social Security  Financial Concerns:  none  Insurance:     Payor/Plan Subscriber Name Rel Member # Group #   MEDICARE - MEDICARE SHANE GRIJALVA  [id number]       ATTN CLAIMS, [address]   MEDICA - MEDICA * SHANE LOCKE  [id number]      [address] [address]       Discharge Plan   Patient and family discharge goal:  Pt's goal is home. Pt not in agreement w/recommendation for TCU but also discussed at length that she felt very weak, was not eating, and was in pain. At this time, pt will not even consider the possibility of going to TCU. Writer provided education/information on what services TCU can offer and the benefits of TCU. Pt accepted a list of Medicare certified SNFs, but then asked writer to put it on the other side of pt's room.   Provided education on discharge plan:  Yes.   Patient agreeable to discharge plan: No  General information regarding anticipated insurance coverage and possible out of pocket cost was discussed. Patient and patient's family are aware patient may incur the cost of transportation to the facility, pending insurance payment: YES  Barriers to discharge:  Medical clearance and safe dc plan.    Discharge Recommendations   Anticipated Disposition:  PT/OT recommending TCU. At this time, pt is adamantly refusing to consider a TCU.  Transportation Needs:  TBD  Name of Transportation Company and Phone:  N/A    PHILLIP Vaughn, LICSW  6B Intermediate Care Unit  Phone: [phone]  Pager: [phone]

## 2017-06-20 NOTE — PROGRESS NOTES
Surgery  06/20/2017     POD#2.  Lost PIVs.  Awaiting PICC.  No flatus    /65  Temp 98.3  F (36.8  C) (Oral)  Resp 16  Wt 60.2 kg (132 lb 11.5 oz)  LMP  (LMP Unknown)  SpO2 97%  BMI 22.96 kg/m2  NAD  Nonlabored breathing  Soft, appropriately tender, wound bed clean, dressing changed. YOSELYN drains in RUQ with bilious drainage, clearing.  Ext wwp    Labs reviewed  141 111 26 / 141  4.1 21 0.66 \  Mg 2.1  P 1.5  LA 1.9    WBC 11.5  Hgb 12.8  Plt 258    , 140 since MN  Medial drain 173  Lateral drain 239    A/P  89F with perforated duodenal ulcer s/p exploratory laparoscopy, primary duodenal ulcer repair, Juan J patch 6/18/17.     - NPO, mIVF  - PICC/TPN, would not attempt NJ placement  - Cont cipro/flagyl as do not have adequate source control with bile leak. Hopeful for spontaneous seal with drains and NPO.  - Pulmonary toilet, mobilize  - PPx: PPI, PCDs, and heparin SQ  - PT/OT  - Dispo: 6B     Seen with Dr. Marshall, chief resident.     Royer Linder MD  General Surgery PGY-2  Pager [phone]

## 2017-06-20 NOTE — PROGRESS NOTES
Surgery Cross-Cover Note  6/19/2017 10:44 PM     Was paged regarding a-fib. Patient noted to be in a-fib with RVR, -140s, confirmed by EKG. SBPs 90s. Patient asymptomatic, resting comfortably in bed, alert and oriented.      Labs:   K 3.6  Mg 2.1  Phos 0.6    Discussed with cardiology fellow on call.  Amiodarone gtt started.  Electrolyte replacement ordered.  Will continue to monitor.      Artemio Guevara MD  PGY-1 General Surgery

## 2017-06-20 NOTE — PROGRESS NOTES
CLINICAL NUTRITION SERVICES - ASSESSMENT NOTE     Nutrition Prescription    RECOMMENDATIONS FOR MDs/PROVIDERS TO ORDER:  -Order lyte replacement protocols (Mg++/K+phos)  -Change IVF to non-dextrose containing prior to start of TPN to avoid lyte abnormalities with refeeding risk.   -Consider decreasing IVF with start of TPN as this provides additional 1440mL/day fluids  -If patient expected to DC on TPN please notify RD or pharmacy so TPN can be cycled prior to discharge    Malnutrition: Non-severe malnutrition in the context of chronic illness    Recommendations already ordered by Registered Dietitian (RD):  -TPN regimen as follows (per 60 dosing weight): 1440mL, 130g dextrose, 90g amino acids + 250mL 20% lipids 5 x per week, pending labs (Mg++/K+ WNL and phos >1.9) advance by 40g dextrose/day to goal of 210g. This regimen provides: 1503kcals, 90g protein, 210g carbohydrate (2.4 mg/kg/min) and 28% fat daily.    Future/Additional Recommendations:  -Monitor for electrolyte abnormalities patient at refeeding risk.        REASON FOR ASSESSMENT  Adia Del Valle is a/an 89 year old female assessed by the dietitian for Pharmacy/Nutrition to Start and Manage PN - GI perforation (Ma, Reagan ACOSTA MD)- central line NOT in place    NUTRITION HISTORY  Pt reports abdominal pain over the past 2-3 months and has not had abdominal pain issues prior to this. She has never been on an acid reducing medication and her last bowel movement was yesterday (diarrhea). Pt was found to have a perforated duodenal ulcer, providers do not want to place NJ, therefore, TPN will be initiated via PICC.    Per pt: states she was eating her entire meals, but was having abdominal pain. She tried miralax and tums, but these did not work. She reports not having any food or water for the past 4 days.     CURRENT NUTRITION ORDERS  Diet: NPO  Intake/Tolerance: Pt reported abdominal pain, back [ain and fatigue/weakness on RD visit.     LABS  LTFs: (H), Phos:  "1.5 (L), Labs reviewed    MEDICATIONS  Medications reviewed    ANTHROPOMETRICS  Height: 1.62 m (5' 4\")  Most Recent Weight:  60.2 kg   IBW: 54.5 kg  BMI: Normal BMI  Weight History: ~8% wt loss in 1 year, ~4% in 4 months. Pt reports weighing about 135lbs a few weeks ago. She states wt loss, however records show only a slight decrease.  Wt Readings from Last 10 Encounters:   06/19/17 60.2 kg (132 lb 11.5 oz)   06/12/17 58.1 kg (128 lb 2 oz)   02/03/17 62.2 kg (137 lb 2 oz)   09/06/16 62.6 kg (138 lb)   05/09/16 64.9 kg (143 lb)   07/07/15 65.3 kg (144 lb)   04/29/15 67.3 kg (148 lb 6 oz)   03/25/15 67 kg (147 lb 12.8 oz)   01/27/15 66.8 kg (147 lb 6 oz)   01/19/15 66.4 kg (146 lb 6.4 oz)     Dosing Weight: 60 kg    ASSESSED NUTRITION NEEDS  Estimated Energy Needs: 6935-3861 kcals/day (25 - 30 kcals/kg)  Justification: Maintenance  Estimated Protein Needs: 72-90 grams protein/day (1.2 - 1.5 grams of pro/kg)  Justification: Maintenance and Repletion  Estimated Fluid Needs: 1 mL/kcal/day   Justification: Maintenance    PHYSICAL FINDINGS  See malnutrition section below.  Poor skin turgor    MALNUTRITION  % Intake: </= 50% for >/= 5 days (severe)  % Weight Loss: Weight loss does not meet criteria  Subcutaneous Fat Loss: Facial region:  mild, Upper arm:  Mild and Lower arm:  mild  Muscle Loss: Temporal:  Mild-moderate, Facial & jaw region:  mild and Upper/ Lower arm  (forearm):  moderate  Fluid Accumulation/Edema: None noted  Malnutrition Diagnosis: Non-severe malnutrition in the context of chronic illness    NUTRITION DIAGNOSIS  Altered GI function related to GI perforation and prolonged NPO diet order post op as evidenced by need for TPN      INTERVENTIONS  Implementation  Nutrition Education: Provided education on role of RD and nutrition POC   Parenteral Nutrition/IV Fluids - Initiate     Goals  Total avg nutritional intake to meet a minimum of 25 kcal/kg and 1.5 g PRO/kg daily (per dosing wt 60 kg).   "   Monitoring/Evaluation  Progress toward goals will be monitored and evaluated per protocol.    Fallon Sebastian RD, MS, LD  6B- Pager: [phone]

## 2017-06-20 NOTE — PROGRESS NOTES
" 06/20/17 1030   Quick Adds   Type of Visit Initial PT Evaluation   Living Environment   Lives With alone   Living Arrangements apartment   Home Accessibility no concerns   Transportation Available family or friend will provide   Self-Care   Dominant Hand right   Usual Activity Tolerance moderate   Current Activity Tolerance poor   Regular Exercise yes   Activity/Exercise Type walking   Activity/Exercise/Self-Care Comment Reports not needing any help at home.   Functional Level Prior   Ambulation 1-->assistive equipment   Transferring 1-->assistive equipment   Toileting 0-->independent   Bathing 0-->independent   Dressing 0-->independent   Eating 0-->independent   Fall history within last six months no   Prior Functional Level Comment Using 4WW at baseline, independent in household. Reports she has a lot of friends who can help if needed   General Information   Onset of Illness/Injury or Date of Surgery - Date 06/18/17   Referring Physician Reagan Marshall MD   Patient/Family Goals Statement return to home   Pertinent History of Current Problem (include personal factors and/or comorbidities that impact the POC) Per chart, patient is a \"89 year old year old female --with a past history of HTN, HL, cardiomegaly, cholecystitis s/p lap cholecystectomy (2007), and OA on chronic naproxen-- who was transferred from [place] 6/18 with three days of abdominal pain and imaging consistent with perforated viscus, presumed to be gastric based on imaging. She was taken to OR for exploratory laparoscopy and repair of duodenal ulcer primarily and with Juan J patch 6/18 by Dr Fletcher.\"   Precautions/Limitations abdominal precautions   General Observations Patient resting in bed, states she is not able to due much because she is really tired. Willing to try working with therapy   General Info Comments Activity Orders: up ad princess   Cognitive Status Examination   Orientation orientation to person, place and time   Level of Consciousness " "alert   Follows Commands and Answers Questions 75% of the time   Personal Safety and Judgment intact   Pain Assessment   Patient Currently in Pain Yes, see Vital Sign flowsheet  (stomach)   Integumentary/Edema   Integumentary/Edema no deficits were identifed   Posture    Posture Comments flexed trunk in sitting and standing   Range of Motion (ROM)   ROM Comment WFL with observed mobility   Strength   Strength Comments at least 3/5 with observed mobility, ankles 4/5 MMT   Bed Mobility   Bed Mobility Comments min A x 2   Transfer Skills   Transfer Comments mod A x 2 sit to stand   Gait   Gait Comments not tested at this time   Balance   Balance Comments fair sitting balance, limited by stomach pain   General Therapy Interventions   Planned Therapy Interventions balance training;bed mobility training;gait training;strengthening;stretching;transfer training;risk factor education;home program guidelines;progressive activity/exercise   Clinical Impression   Criteria for Skilled Therapeutic Intervention yes, treatment indicated   PT Diagnosis deconditioning   Influenced by the following impairments pain, fatigue, weakness   Functional limitations due to impairments impaired mobility, impaired balance, impaired gait, decreased activity tolerance   Clinical Presentation Evolving/Changing   Clinical Presentation Rationale PMH and current medical condition   Clinical Decision Making (Complexity) Moderate complexity   Therapy Frequency` daily   Predicted Duration of Therapy Intervention (days/wks) 2 weeks   Anticipated Discharge Disposition Transitional Care Facility   Risk & Benefits of therapy have been explained Yes   Patient, Family & other staff in agreement with plan of care Yes   Clinical Impression Comments Patient presents below baseline, resides independently at home and current requiring Ax1-2 for mobility.   [facility] AM-PAC TM \"6 Clicks\"   2016, Trustees of [facility], under license to [employer].  " "All rights reserved.   6 Clicks Short Forms Basic Mobility Inpatient Short Form   [facility] AM-PAC  \"6 Clicks\" V.2 Basic Mobility Inpatient Short Form   1. Turning from your back to your side while in a flat bed without using bedrails? 3 - A Little   2. Moving from lying on your back to sitting on the side of a flat bed without using bedrails? 2 - A Lot   3. Moving to and from a bed to a chair (including a wheelchair)? 2 - A Lot   4. Standing up from a chair using your arms (e.g., wheelchair, or bedside chair)? 2 - A Lot   5. To walk in hospital room? 1 - Total   6. Climbing 3-5 steps with a railing? 1 - Total   Basic Mobility Raw Score (Score out of 24.Lower scores equate to lower levels of function) 11   Total Evaluation Time   Total Evaluation Time (Minutes) 12     "

## 2017-06-20 NOTE — PROGRESS NOTES
06/20/17 1332   Quick Adds   Type of Visit Initial Occupational Therapy Evaluation   Living Environment   Lives With alone   Living Arrangements (senior apartment)   Home Accessibility tub/shower is not walk in;grab bars present (bathtub)  (does not have shower chair)   Number of Stairs to Enter Home 0   Number of Stairs Within Home 0   Transportation Available family or friend will provide;agency transportation   Self-Care   Usual Activity Tolerance moderate   Current Activity Tolerance poor   Regular Exercise yes   Activity/Exercise Type (reports doing seated exercises regularly)   Equipment Currently Used at Home (4WW, cane)   Activity/Exercise/Self-Care Comment Reports baseline LE weakness   Functional Level Prior   Ambulation 1-->assistive equipment   Transferring 1-->assistive equipment   Toileting 0-->independent   Bathing 0-->independent   Dressing 0-->independent   Eating 0-->independent   Cognition 0 - no cognition issues reported   Fall history within last six months no   Prior Functional Level Comment Pt I or mod I in most ADL/IADLs. Pt has some services as needed at her apartment, but only uses the meal service occasionally. Pt completes all cleaning, laundry, medication management.    General Information   Referring Physician Deana Wong MD   Patient/Family Goals Statement Return to [facility]   Additional Occupational Profile Info/Pertinent History of Current Problem 89F with perforated duodenal ulcer s/p exploratory laparoscopy, primary duodenal ulcer repair, Juan J patch 6/18/17   Precautions/Limitations fall precautions;abdominal precautions   Cognitive Status Examination   Cognitive Comment Pt reports no issues at baseline, will assess further   Visual Perception   Visual Acuity Macular degeneration   Strength   Strength Comments NT 2/2 abdominal precautions   Clinical Impression   Criteria for Skilled Therapeutic Interventions Met yes, treatment indicated   OT Diagnosis Decreased I in  ADLs due to deconditioning   Assessment of Occupational Performance 3-5 Performance Deficits   Identified Performance Deficits bathing, dressing, G/H, toileting, functional endurance   Clinical Decision Making (Complexity) Low complexity   Therapy Frequency (6x/week)   Predicted Duration of Therapy Intervention (days/wks) 2 weeks   Anticipated Equipment Needs at Discharge (TBD)   Anticipated Discharge Disposition Transitional Care Facility   Risks and Benefits of Treatment have been explained. Yes   Patient, Family & other staff in agreement with plan of care Yes   Total Evaluation Time   Total Evaluation Time (Minutes) 8

## 2017-06-20 NOTE — CONSULTS
Cardiology Consult Note    Reason for consult: post of afib  ------------------  Assessment:  Atrial fibrillation, post op  - diagnosed 6/19 10pm, non valvular, no prior DCCV  - drivers of afib include critical illness, post op pain, Peritonitis from perforated duodenum  - [id number] score 4 but will hold off on anticoagulation for now  - rate control with BB and CCB challenging given SBP 80s. Therefore will start amiodarone drip now  - consult team will follow and further recommendations re: dose and duration of amiodarone to follow  - monitor daily LFTs given amiodarone use  - K>4, Mg> 2    To be staffed with Dr. Rodriguez in the morning  Naveed Mcdonald MD  Cardiology Fellow    ------------------  CC: post op afib     HPI: Adia Del Valle is a 89 year old with perforated duodenal ulcer s/p exploratory laparoscopy, primary duodenal ulcer repair, Juan J patch 6/18/17 and cardiology was consulted for post of afib.   Cardiac risk factor profile (+) HTN, (-) Type II DM, (+) HLD, (-) tobacco use, (-) premature family CAD   At baseline, no cardiac history. She was transferred from [place] 6/18 with three days of abdominal pain and imaging consistent with perforated viscus, presumed to be gastric based on imaging. She was taken to OR for exploratory laparoscopy and repair of duodenal ulcer primarily and with Juan J patch 6/18. Today, she had sudden onset of afib w/ RVR. Was given metop 5mg iv x 1 but SBP 80s. She is asymtpomatic and denies any chest pain, dyspnea, orthopnea, PND, palptiations, pre-syncope.     Gen: -fever, -chills  HEENT: -decreased vision, -throat pain  Pulm: -dyspnea, -cough, -hemoptysis  CV: -chest pain, -exertional dyspnea, -orthopnea, -PND, -edema, -palpitations, -claudication  GI: -nausea, -vomiting, -diarrhea, -hematochezia  Heme: -NS, -weight loss  Neuro: -syncope, -dizziness  Endocrine: -polyuria, -polydypsia, -polyphagia  MSK: -myalgias  Skin: -rash or ecchymosis    PAST MEDICAL HISTORY:  Past  Medical History:   Diagnosis Date     Cardiomegaly      Cholecystitis, unspecified      Disorder of bone and cartilage, unspecified      Generalized osteoarthrosis, unspecified site      Lump or mass in breast      Pure hypercholesterolemia      Unspecified essential hypertension      Unspecified venous (peripheral) insufficiency        PAST SURGICAL HISTORY:  Past Surgical History:   Procedure Laterality Date     CATARACT IOL, RT/LT       CHOLECYSTECTOMY, LAPOROSCOPIC  8/07    Cholecystectomy, Laparoscopic     COLONOSCOPY       ENDOSCOPIC RELEASE CARPAL TUNNEL  4/25/2012    Procedure:ENDOSCOPIC RELEASE CARPAL TUNNEL; RIGHT ENDOSCOPIC CARPAL TUNNEL RELEASE; Surgeon:SERG PERKINS; Location:[facility]     GASTROSCOPY N/A 6/18/2017    Procedure: GASTROSCOPY;;  Surgeon: Rolly Fletcher MD;  Location: [facility] OR     LAPAROSCOPIC CHOLECYSTECTOMY N/A 6/18/2017    Procedure: LAPAROSCOPIC CHOLECYSTECTOMY;  Laparoscopic Exploration of Abdomen, Upper Endoscopy, Closure of duodenal Ulcer, Modified Juan J Patch ;  Surgeon: Rolly Fletcher MD;  Location: [facility] OR     LAPAROTOMY EXPLORATORY N/A 6/18/2017    Procedure: LAPAROTOMY EXPLORATORY;;  Surgeon: Rolly Fletcher MD;  Location: [facility] OR     PHACOEMULSIFICATION CLEAR CORNEA WITH STANDARD INTRAOCULAR LENS IMPLANT  5/23/2013    Procedure: PHACOEMULSIFICATION CLEAR CORNEA WITH STANDARD INTRAOCULAR LENS IMPLANT;  RIGHT PHACOEMULSIFICATION CLEAR CORNEA WITH STANDARD INTRAOCULAR LENS IMPLANT ;  Surgeon: Vidal Heredia MD;  Location[facility]     SURGICAL HISTORY OF -       left breast biopsy     SURGICAL HISTORY OF -       left hammertoe and bunionectomy     SURGICAL HISTORY OF -   2008    cataract extraction     SURGICAL HISTORY OF -   2009    left carpal tunnel release     TONSILLECTOMY         SH and FH reviewed in EPIC    Cardiac meds: amiodarone gtt, hep 5k SC q12h    ALL  Allergies   Allergen Reactions     Codeine Sulfate      Dizzy, nauseated     Tramadol Other  (See Comments)     dizzy       VITAL SIGNS:  BP (!) 80/65  Temp 99.1  F (37.3  C) (Axillary)  Resp 20  Wt 60.2 kg (132 lb 11.5 oz)  LMP  (LMP Unknown)  SpO2 97%  BMI 22.96 kg/m2    PHYSICAL EXAM  General: NAD  Eyes:  PERRL, EOMI  Respiratory: CTAB, no wheezes, rales  Cardiac: JVP wnl, normal S1/S2. no S3 or S4. no murmurs  GI: soft, tender to palpation  MSK: no deformities  Skin: no rash  Neurologic: alert, oriented    LABS: reviewed in [facility]    EKG 6/19 - Afib w/     Echo 1/2015   Global and regional left ventricular function is normal with an EF of 60-65%.   Right ventricular function, chamber size, wall motion, and thickness are   normal. Pulmonary artery systolic pressure is normal. The inferior vena cava    is normal. No pericardial effusion is present.    Addendum: The patient was discussed with Dr. Rodrgiuez. After amiodarone for several hours she has converted to NSR. Her afib, as above, is likely from increased catecholamine drive in the setting of post operative status, post op pain. We will get an ECHO as an EKG is with low voltage and her albumin is quite low, assess for pericardial effusion.    -Please replace her lytes with goal K > 4, Mg > 2   -Finish amiodarone drip and then transition to oral amiodarone 400 mg BID x 7 days, then 400 mg daily x 7 days, then 200 mg daily x 7 days   -Have this patient follow up in 1 month with cardiology or EP to address continued need for Amio  -Would not give this patient AC given recent procedure despite [id number] score of 4     We will sign off after ECHO results if negative. Thank you for involving us in this patient's care.     I interviewed and examined the patient with the house staff.  I agree with the assessment and plan as documented.      Grant Rodriguez MD, PhD  Professor of Medicine  Division of Cardiology

## 2017-06-20 NOTE — PROVIDER NOTIFICATION
6/19/17 2225: Dr. Guevara with surgery cross-cover notified pt now in Afib w/ RVR with HR between 140-160s with frequent PVCS. Stat EKG ordered shows Afib/RVR as well. BP dropped to 80s/40s, amio gtt initiated.     Critical lab of 0.6 phos reported x2. MD aware both times immediately. Potassium phos ordered.     6/20/17 0330: MD aware of low UOP throughout the night. Flushed valentino and bladder scanned per MD request, Bladder scan showed 0ml in bladder. Flushed valentino without complications. Dr. Guevara also aware throughout the night of ongoing vascular access difficulty. PICC ordered to be placed this AM.

## 2017-06-20 NOTE — PLAN OF CARE
Problem: Goal Outcome Summary  Goal: Goal Outcome Summary  Outcome: No Change  Neuro: A&Ox4. Irritated at end of shift d/t multiple sticks/IV starts/lack of sleep.   Cardiac: SR when acquired form 4A at 2100. At 2225, began Afib with RVR HR 140s-160s SBP in 90s. Amio bolus and infusion started. End of shift, SBP 100s/60s, -120. Asymptomatic for cardiac symptoms.           Respiratory: Sating 95% on RA. Pt educated on IS but can only get 250. Educated that 10x and hour needed to be done to prevent post-op complications. No SOB.   GI/: Minimal UOP overnight. 1L LR bolus given and 500 bolus ordered this AM but unable to start d/t IV complications. Hypoactive bowels. 2 YOSELYN drains with ~15ml Q2-4 hrs serous fluid. No N/V.   Diet/appetite: Tolerating NPO diet. NG to low int. Suction, had 200 overnight brown/rust colored output.   Activity:  Assist of *, up to chair and in halls.  Pain: At acceptable level on current regimen. Toradol given x1.   Skin: Intact, no new deficits noted. 2 RN skin assessment performed at 2100 with ROSA Arana.      R: Continue with POC. Notify primary team with changes.

## 2017-06-20 NOTE — PLAN OF CARE
Problem: Goal Outcome Summary  Goal: Goal Outcome Summary  Outcome: Improving  PICC line inserted this morning. Patient was extremely anxious and frustrated regarding strict NPO orders. Throughout day with reinforcement about TPN teaching, patient appears to become more calm and cooperative. Lipids started at 1300, TPN will start tonight at 2000. AOx4, minimal pain, afebrile. Room air. Converted to sinus rhythm from afib around 1245, has been sinus 80's-90's. Normotensive. NG to LIS, strict NPO. UOP has varied all day between 10mL/hr to 50mL/hr, service is aware and will continue to monitor. ECHO completed today. Sat on edge of bed x1 with PT. See flowsheets for futher details.      Plan: Continue to monitor closely, continue with strict NPO, start TPN. Will update service with changes or concerns regarding patient condition.

## 2017-06-21 NOTE — PLAN OF CARE
Problem: Goal Outcome Summary  Goal: Goal Outcome Summary  PT 7A: Pt participated well in PT this visit and appears to be gaining confidence, but is yet much weaker than baseline. Pt required MODA-MAXA x 1 supine>sit, MODA-MAXA x 1 sit<>stand with reduced eccentric control and safety noted stand>sit. Pt ambulated short distance near bedside with SHIRA and FWW up to ~ 12 ft. O2 sats 96% on 4L via NC. Pt declined further gait due to fatigue. Pt was able to perform seated ex. Therapist educated pt on goals of sitting up in chair often throughout the day and rationale.     Recommend discharge to TCU to address below baseline functional strength, safety and balance.

## 2017-06-21 NOTE — PROGRESS NOTES
Surgery Progress Note    Subjective/Interval History:  POD #3. Increasing O2 requirements overnight, put on 4L oxygen from room air. Complaining of phlegm output. Lasix given last night.     Objective:  Temp:  [97.9  F (36.6  C)-98.7  F (37.1  C)] 98.4  F (36.9  C)  Pulse:  [93] 93  Heart Rate:  [] 87  Resp:  [20-26] 24  BP: (115-158)/(61-88) 147/87  SpO2:  [90 %-95 %] 95 %    General appearance: in NAD  Pulm: Non-labored breathing; on 4L O2 via NC.  Abd: Soft, NT, ND. YOSELYN drains in RUQ with bilious drainage.   Skin: warm and well-perfused.       Assessment/Plan:     89F with perforated duodenal ulcer s/p exploratory laparoscopy, primary duodenal ulcer repair, Juan J patch 6/18/17.      - NPO, mIVF  - Malnutrition: PICC/TPN, would not attempt NJ placement  -  Perforated duodenal ulcer: Cont cipro/flagyl. Hopeful for spontaneous seal with drains and NPO. Planning contrast study in the coming days to evaluate for ongoing leak.  - Pulmonary toilet, mobilize. Called RT, junior Q4 hours while awake.   - Afib: Followed by cardiology, put on amiodarone, f/u in clinic in 1 month.  - PPx: PPI, PCDs, and heparin SQ  - PT/OT  - Dispo: 6B     Patient discussed with Dr. Marshall, chief resident    Gigi Zimmerman,   General Surgery, PGY-1  Pg [phone]

## 2017-06-21 NOTE — PLAN OF CARE
Problem: Goal Outcome Summary  Goal: Goal Outcome Summary  Outcome: No Change  Neuro: A&Ox4.   Cardiac: SR HR 80s-90s. VSS. SBP 130s-150s. At 0600 pt c/o of chest fluttering/palpitations. No pain. Surgery team aware.  Respiratory: Sating sufficient on RA at beginning of shift, in the middle of the night pt was sat-ing 88%, sufficient O2 acquired on 2L. At 0400 pt O2 needs increased to 4-5 L NC. Lung sounds diminished throughout entire shift, even with changes in O2 sats.   GI/: Adequate urine output, much improved overnight. 10mg lasix given, had 825 out of urine over last 2 hours. Hypoactive bowels. YOSELYN drains 15-40 Q4, #1 with serous drainage, #2 bile/brown drainage. Incisions WDL.  Still continuing IS with 300 max amount, needs reinforcing often on proper technique. No SOB reported.    Diet/appetite: Tolerating TPN/lipids and NPO. ~250ml, brown output from NG, LIS.   Activity:  Assist of 2 to turn/boost. Turn Q2.   Pain: At acceptable level on current regimen. Used toradol X1 which greatly helped abd pain. Denied pain with 0400 assessment  Skin: Intact, no new deficits noted. Blanchable redness to bottom. Surgical incisions WDL.      R: Continue with POC. Notify primary team with changes.

## 2017-06-21 NOTE — PROVIDER NOTIFICATION
0400-- MD Dr. Guevara notified of pt increased O2 needs, Now needing 4-5L NC. Other VSS. No SOB/chest pain. Attempted to trouble shoot equipment, different probes, IS, without improvement in O2 sats. One time dose of lasix given.     0600--MD notified that pt c/o chest palpitations. Surgery team notified as well during rounds at 0650.

## 2017-06-21 NOTE — PROGRESS NOTES
Came to assess patient for  NT suctioning needed Patient sitting chair BS clear bilateral appears comfortable Sao2 93%  Talk to nurse inform not needed at this time will return for evening therapy to assess if NT suctioning needed at that time.

## 2017-06-21 NOTE — PLAN OF CARE
Problem: Goal Outcome Summary  Goal: Goal Outcome Summary  OT: 6B: Pt progressing well in therapy and transferred sit <> stand pivot EOB <> chair w/ mod A x1 and 2nd person for line mgmt for ADLS EOB and in chair, VSS throughout.   Rec: TCU to increase ind in ADLS/IADLS

## 2017-06-22 NOTE — PROVIDER NOTIFICATION
Notified surgery cross cover of increased PVC's per tele tech. Pt asymptomatic, VSS. Will call lab to get AM draw STAT, MD to put in orders for EKG.

## 2017-06-22 NOTE — PLAN OF CARE
Problem: Goal Outcome Summary  Goal: Goal Outcome Summary  Outcome: No Change  /73 (BP Location: Left arm)  Pulse 93  Temp 98  F (36.7  C) (Oral)  Resp 18  Wt 65.9 kg (145 lb 4.5 oz)  LMP  (LMP Unknown)  SpO2 96%  BMI 25.13 kg/m2     Cognitive: A&Ox4. Forgetful.  Cardiac: sinus rhythm sustains 80-90's with PAC's and increased number of PVC's. -160's. Hydralazine given x1.  Resp: 2L NC.  GI/: Del Real with adequate output.  Diet/Appetite: NPO, TPN with lipids.  Access: R double lumen PICC with TPN/lipds and Amiodarone at 30mL/hr. L PIV, TKO.  Drains: 2 R abdominal YOSELYN drains to bulb suction with minimal output. NG to LIS with brown output.  Activity: Refused repositioning.  Pain: Denies.      Replacing 1 of 4 potassium and 1 of 1 magnesium.     Will continue with plan of care and notify MD of any changes.

## 2017-06-22 NOTE — PROGRESS NOTES
Surgery Progress Note    Subjective/Interval History:  Denies pain. On 2L of O2 today, down from 4L from yesterday. Ambulating.     Objective:  Temp:  [97.4  F (36.3  C)-98.6  F (37  C)] 98.5  F (36.9  C)  Heart Rate:  [80-95] 90  Resp:  [18-24] 20  BP: (122-166)/(66-94) 159/74  SpO2:  [93 %-98 %] 96 %    General appearance: in NAD  Pulm: Non-labored breathing; saturations improving on 2L O2  Abd: Soft, NT, ND. YOSELYN drains in RUQ.   Skin: warm and well-perfused.     Labs reviewed    Assessment/Plan:   89F with perforated duodenal ulcer s/p exploratory laparoscopy, primary duodenal ulcer repair, Juan J patch 6/18/17.       - NPO, mIVF  - Malnutrition: PICC/TPN, would not attempt NJ placement  -  Perforated duodenal ulcer: Cont cipro/flagyl. Hopeful for spontaneous seal with drains and NPO. Planning contrast study in the coming days to evaluate for ongoing leak. - IV contrast only, no PO contrast.  -  WBC count up from 11.7 to 14.4 today. If higher tomorrow will re-scan.  - If temp >101.5 will start Vanc and Zosyn along with re-scan.   - Pulmonary toilet, mobilize. Oxygenating improving on 2L of O2.   - Afib: Followed by cardiology, put on amiodarone, f/u in clinic in 1 month. PVC's overnight.  - PPx: PPI, PCDs, and heparin SQ  - PT/OT  - Dispo: 6B      Patient discussed with Dr. Marshall, chief resident    Gigi Zimmerman, DO  General Surgery, PGY-1  Pg [phone]

## 2017-06-22 NOTE — PLAN OF CARE
Problem: Goal Outcome Summary  Goal: Goal Outcome Summary  PT 6B: Pt completes bed mobility with min assist, sit to stand transfers with mod assist of 1-2. Pt ambulates 50ft + 100ft with FWW, mod assist of 1 for stability, and wheelchair follow. Pt on 2L O2 throughout session, SpO2 ~93%. Pt SOB with activity, reports she is very weak and not getting better.   Recommend discharge to TCU when medically appropriate to improve strength, activity tolerance, and independence with functional mobility.

## 2017-06-22 NOTE — PROGRESS NOTES
Called to See Patient:    Frequent PVCs as per telemetry staff    S: patient has no complain of CP, SOB, palpitations,     O:     Vitals:   /73 (BP Location: Left arm)  Pulse 93  Temp 98  F (36.7  C) (Oral)  Resp 18  Wt 65.9 kg (145 lb 4.5 oz)  LMP  (LMP Unknown)  SpO2 96%  BMI 25.13 kg/m2    O/E:  Alert, comfortable  Abd: SNT    ECG: shows PVCs, no ST changes    D/W Dr Rodriguez ( payal on call)    Plan:  To send for labs stat  To continue with current management  Handed over to primary team to trace labs and correct electrolytes accordingly

## 2017-06-23 NOTE — PROVIDER NOTIFICATION
Notified surgery cross cover for patient request to keep valentino cath till 4am , because she wants to sleep during the night . Spoke to Dr Hernández about the above .   Per Dr Hernández ok to keep valentino till  4am .

## 2017-06-23 NOTE — PLAN OF CARE
Problem: Goal Outcome Summary  Goal: Goal Outcome Summary  OT-6B: Cancel. Per discussion with RN, pt not appropriate and in atrial fibrillation. Will reschedule.

## 2017-06-23 NOTE — PLAN OF CARE
Problem: Goal Outcome Summary  Goal: Goal Outcome Summary  Outcome: No Change  /76 (BP Location: Left arm)  Pulse 93  Temp 97.9  F (36.6  C) (Oral)  Resp 20  Wt 65.5 kg (144 lb 6.4 oz)  LMP  (LMP Unknown)  SpO2 93%  BMI 24.98 kg/m2     Cognitive: A&Ox4. Forgetful.  Cardiac: Sinus rhythm 's with frequent PAC'S and PVC'S. -160's.  Resp: 2L NC.  GI/: Adequate urine output, valentino removed.  Diet/Appetite: NPO. TPN 60mL/hr with lipids.  Skin: Blanchable redness on bottom.  Access:R double lumen PICC, Amiodarone gtt at 30mL/hr, and L PIV.  Drains: NG to LIS with 225mL output brown/tan total overnight, YOSELYN drains x2 with minimal yellow/clear output.  Activity: Repo   Pain: Denies pain.    C/O nausea after lab left this AM. Zofran given x1.   Pt [place] IRA and has macular degeneration.   Potassium 1 of 2 infusing. Mag 2g infusing.     Will continue with plan of care and notify MD of any changes.

## 2017-06-23 NOTE — PLAN OF CARE
Problem: Goal Outcome Summary  Goal: Goal Outcome Summary  PT 6B: Pt completes short distance ambulation to bathroom with FWW and min assist of 1, needs min assist of 2 for sit to stand transfers. Pt also stands to do self cares in bathroom, sits up in chair for about 5 minutes. Pt fatigued, reports she isn't getting stronger, needs encouragement to participate.   Recommend discharge to TCU when medically appropriate to improve strength, activity tolerance, and independence with functional mobility.

## 2017-06-23 NOTE — PLAN OF CARE
Problem: Goal Outcome Summary  Goal: Goal Outcome Summary  Outcome: Improving  [place] . Pleasant . A&OX4 . Denied any pain and nausea . VSS . Afebrile , sat above 94% on 2 lit NC .  TELE SR/ST  Abdomin soft . Hypoactive BS . Not passing gas . NG to ILS with moderate output of clear-brown output . Per MD do not flush the NG tube. Abdominal JPs with small output . Potassium . phosphorus and magnesium replaced during the shift . Next recheck will be in am tomorrow .   Amiodarone gtt infusing at 30mg/min . Walked x1 and up chair x2 during the shift . Patient requested to keep the valentino till  4am tomorrow so she can sleep during the night . Stable . Continue to monitor and report any concerns.

## 2017-06-23 NOTE — PROGRESS NOTES
Surgery Progress Note    Subjective/Interval History:    Complaining of increasing abdominal pain with nausea. NG in place.   Objective:  Temp:  [97.9  F (36.6  C)-98.5  F (36.9  C)] 98  F (36.7  C)  Pulse:  [93] 93  Heart Rate:  [86-92] 90  Resp:  [18-20] 18  BP: (125-162)/(66-91) 143/77  SpO2:  [93 %-96 %] 95 %    General appearance: in NAD, awake, alert, oriented  Pulm: Non-labored breathing; saturating well on 2L  Abd: Soft, NT, ND. Incisions c/d/i  Skin: warm and well-perfused.       Assessment/Plan:   89F with perforated duodenal ulcer s/p exploratory laparoscopy, primary duodenal ulcer repair, Juan J patch 6/18/17.       - NPO, mIVF  - Malnutrition: PICC/TPN, would not attempt NJ placement  -  Perforated duodenal ulcer:  Hopeful for spontaneous seal with drains and NPO. Planning contrast study in the coming days to evaluate for ongoing leak. - IV contrast only, no PO contrast.  -  WBC elevating, will start ertapenem  - Pulmonary toilet, mobilize. Oxygenating stable on 2L of O2    - Afib: Followed by cardiology, dc amiodarone  - PPx: PPI, PCDs, and heparin SQ  - PT/OT  - Procalcitonin level pending  - Dispo: 6B       Patient discussed with Dr. Marshall, chief resident     Gigi Zimmerman, DO  General Surgery, PGY-1  Pg [phone]

## 2017-06-23 NOTE — PROVIDER NOTIFICATION
Patient converted to a-fib 130 . Amio gtt was stopped at noon . Patient on schedule metoprolol 2.5 q6hrs . Patient has a prn iv metoprolol when HR above 120 . 5 mg iv metoprolol . HR slowed  . Spoke to Dr RG about the above . Scheduled iv metoprolol increased to 5 mg q6hrs . Will monitor

## 2017-06-23 NOTE — PHARMACY-VANCOMYCIN DOSING SERVICE
Pharmacy Vancomycin Note  Date of Service 2017  Patient's  [id number]   89 year old, female    Indication: Community Acquired Pneumonia  Goal Trough Level: 15-20 mg/L  Day of Therapy: 3  Current Vancomycin regimen:  2000 mg IV q12h    Current estimated CrCl = Estimated Creatinine Clearance: 67.9 mL/min (based on Cr of 0.48).    Creatinine for last 3 days  2017:  4:57 AM Creatinine 0.62 mg/dL  2017:  5:39 AM Creatinine 0.47 mg/dL  2017:  5:27 AM Creatinine 0.48 mg/dL    Recent Vancomycin Levels (past 3 days)  No results found for requested labs within last 72 hours.    Vancomycin IV Administrations (past 72 hours)      No vancomycin orders with administrations in past 72 hours.                Nephrotoxins and other renal medications (Future)    Start     Dose/Rate Route Frequency Ordered Stop    17  ketorolac (TORADOL) injection 15 mg      15 mg Intravenous EVERY 6 HOURS PRN 17             Contrast Orders - past 72 hours     None          Interpretation of levels and current regimen:  Trough level is  Therapeutic - Level was 20.2 but drawn at 10 hours post dose (2 hours before next dose), true trough expected to be within goal of 15-20    Has serum creatinine changed > 50% in last 72 hours: No    Urine output:  good urine output    Renal Function: Stable    Plan:  1.  Continue Current Dose  2.  Pharmacy will check trough levels as appropriate in 1-3 Days.    3. Serum creatinine levels will be ordered daily for the first week of therapy and at least twice weekly for subsequent weeks.      Juliet Hull        .

## 2017-06-23 NOTE — PROVIDER NOTIFICATION
"Notified gen surg of pt's new nausea, pain in mid abdomen/hard abdomen and general \"funny feeling\". Team rounding currently and will stop by.   "

## 2017-06-24 NOTE — PLAN OF CARE
Problem: Goal Outcome Summary  Goal: Goal Outcome Summary  Outcome: No Change  /77 (BP Location: Left arm)  Pulse 93  Temp 98.1  F (36.7  C) (Oral)  Resp 18  Wt 63.8 kg (140 lb 10.5 oz)  LMP  (LMP Unknown)  SpO2 97%  BMI 24.33 kg/m2     Remains A-fib with rates 100's-110's. They have not been sustained above 120 during night. All other vitals stable. Afebrile. 2L NC with coarse and crackly lungs. These sounds improve with the frequent encouragement to cough and use her IS/Acapella. NPO. NG to LIS with moderate brown output. YOSELYN x 2 with small output. Lidocaine patch in place on abdomen and abdominal binder in place. 1 small BM. Denies use of pain medication and only wants repositioning. Up to commode x 2 to void. Initially retained about 90 cc of urine after voiding around 21:00 but expelled most of her urine at 01:34. This AM at 05:30 she retained 233 cc of urine after bladder scanning for 359. See I&O for details. MD notified of this and she said to ask patient to try to void again when she was ready. RN acknowledged. TPN and lipids infusing. Needs frequent instructions on how to use the call light. Continue to monitor.

## 2017-06-24 NOTE — PLAN OF CARE
Problem: Goal Outcome Summary  Goal: Goal Outcome Summary  Outcome: No Change  [place] . Pleasant . A&OX4  . Am Nausea resolved with iv compazine . Complained of back pain . Declined any pain intervention . VSS . Afebrile  , On 2 Lit NC . Tele was on NSR ,Amio gtt was d/c at noon and started on iv metoprolol q6 hrs . Patient converted to A-fib 120-130 at 14:55 ( asymptomatic) . Prn iv metoprolol x2 given rate slowed to 's . Dr RG and DR Zimmerman are aware about the changes . NG to ILS with brown output . Not passing gas . Weak coughing , needed a lot encouragement to use the acepella and IS . Walked to bathroom x2 and up chair x2 during the shift . Voided small urine . straight cath x2 during the shift . Potassium and magnesium replaced . Next recheck will be with am lab . Continue to monitor and report any concerns.

## 2017-06-24 NOTE — PLAN OF CARE
Problem: Goal Outcome Summary  Goal: Goal Outcome Summary  Outcome: Therapy, progress towards functional goals is fair  OT-6B: Late entry. Pt required moderate encouragement to participate. Therapist educated pt on and reviewed abdominal precautions and safety with functional mobility. Pt required Mod A and Max vc's for transfer supine<->seated EOB. Pt required Min A , vc's and FWW for transfer sit<->standing ambulation to bathroom. Pt engaged in self cares seated with setup and Mod A. Pt tolerated session well. VSS.      REC: Discharge to TCU when medically appropriate.

## 2017-06-24 NOTE — PROVIDER NOTIFICATION
Spoke to Dr Zimmerman about continued A-fib  ( asymptomatic)   . Scheduled and prn iv metoprolol given . Hemodynamically stable. Patient also needed straight cath x2 for urine retention . No new order issued . Will monitor .

## 2017-06-24 NOTE — PROGRESS NOTES
Surgery Progress Note    Subjective/Interval History:  Had 2 bowel movements this AM. Patient rate controlled with metoprolol for her afib, occasionally low BP's. Denies nausea/vomiting. Pain controlled.     Objective:  Temp:  [97.5  F (36.4  C)-98.4  F (36.9  C)] 97.5  F (36.4  C)  Heart Rate:  [] 110  Resp:  [16-20] 18  BP: (102-141)/() 102/71  SpO2:  [93 %-97 %] 97 %    General appearance: in NAD  Pulm: Non-labored breathing; saturating well on NC 2L  Abd: Soft, NT, ND. Incision c/d/i.   Skin: warm and well-perfused.     NG tube in place    Assessment/Plan:   89F with perforated duodenal ulcer s/p exploratory laparoscopy, primary duodenal ulcer repair, Juan J patch 6/18/17.     -Perforated duodenal ulcer: Hopeful for spontaneous seal with drains and NPO. Ordered CT study to evaluate for ongoing leak. No PO contrast.  - Cardiology consulted for afib. They advised to continue current metoprolol regimen as long as rates are controlled.  -WBC trended upwards yesterday, results today are pending. Procalcitonin levels dropping. Continue ertapenem therapy.  - Prophylaxis: PPI, PCDs, and heparin SQ  - Pulmonary toilet, mobilize. Oxygenation stable well on   - PT/OT      Patient discussed with Dr. Israel Zimmerman, DO  General Surgery, PGY-1  Pg [phone]

## 2017-06-24 NOTE — PROVIDER NOTIFICATION
Notified Surgery cross cover for urine retention .  mls . Patient tired for using the bathroom often .   Spoke to Dr Argentina Santiago about the above . New order of valentino cath placement for urine retention issued .

## 2017-06-24 NOTE — PROVIDER NOTIFICATION
Notified surgery team about a-fib 's . Patient's SBP drop to 70's and MAP below 65  . Patient asymptomatic for a-fib and low BP . Frequent BP check done . SBP improved  . Patient continued to void small amount with PVR Still in 300's . Team came to assess patient at bedside . Plan for card consult and to place valentino if PVR above 400s.   Will monitor and report any concerns.

## 2017-06-24 NOTE — PLAN OF CARE
Problem: Goal Outcome Summary  Goal: Goal Outcome Summary  Outcome: No Change  [place] , pleasant . A&OX4 , Forgetful at times . Denied any pain on Rest . Denied any nausea . Vitals with soft LOW BP in am  . Please see the MD notification . BP improved around noon.   Afebrile. Sat above 94% on 2 Lit NC . Tele A-fib ( asymptomatic )  . Please the MD notification note , Cards was consulted . No Amio gtt . Patient had one prn iv metoprolol for HR in the 130. Scheduled iv metoprolol given per order . Breath sound coarse /crackles . Needed a lot encouragement to use the IS and the acepella .  WBC was up . Abdominal CT was ordered  . Patient had dulcolax supp . Patient had three larger>> medium >> medium brown BMs . NG tube to ILS patent . Output trending down compare to yesterday . Potassium replaced per order . Next recheck will be tomorrow . Up to bathroom with assist of two three times. Del Real was placed for urine retention . Stable . Continue to monitor and report any concern .

## 2017-06-25 NOTE — PLAN OF CARE
Problem: Goal Outcome Summary  Goal: Goal Outcome Summary  Outcome: No Change  /56 (BP Location: Left arm)  Pulse 93  Temp 98.1  F (36.7  C) (Oral)  Resp 18  Wt 62.5 kg (137 lb 12.6 oz)  LMP  (LMP Unknown)  SpO2 95%  BMI 23.84 kg/m2     Patient remains in A-fib with rates controlled in the 80's-110's. All other vitals stable. Afebrile. Alert and oriented x 4. Patient did pull her NG tube out but was aware of doing it. This RN and other's are not sure if it was due to forgetfulness. Mitts have been on intermittently for this reason. NG to LIS with about  cc output every 4 hours.  Oxygen has been weaned to RA with sat's mid 90's. Lung sounds coarse and encouragement needed for pulmonary toilet. Del Real in place with good UOP. 3 Loose incontinent small-large stools during shift. Repositioned q 2 hours. Abdominal binder over incisions in place. 2 lower abdominal YOSELYN's with small output. Denies pain. No n/v. TPN infusing at goal. Continue to monitor.

## 2017-06-25 NOTE — PLAN OF CARE
Problem: Goal Outcome Summary  Goal: Goal Outcome Summary  Outcome: No Change  Pt AOx4, forgetful at times, VSS, afebrile, on RA sats > 95%. HR 's, afib. NG to LIS, YOSELYN x 2 with small amount of serous output. abdominal binder on. Up with assist of 2 to commode, multiple loose BM's, incontinent at times. Denies pain. NPO. Using call light appropriately throughout day, more impulsive and forgetful this afternoon, setting bed alarm off, needing reminder to use call light. Will continue to monitor per plan of cares.

## 2017-06-25 NOTE — PROGRESS NOTES
Surgery Progress Note    Subjective/Interval History:  Feeling disoriented today. Afib controlled on metoprolol with rates around 100-110. BP's increasing today. NPO getting TPN. No N/V. Had loose bm's overnight.     Objective:  Temp:  [97.7  F (36.5  C)-98.3  F (36.8  C)] 98.1  F (36.7  C)  Heart Rate:  [] 104  Resp:  [16-20] 18  BP: ()/() 130/75  SpO2:  [94 %-98 %] 95 %    General appearance: in NAD  Pulm: Non-labored breathing; saturating well on RA  Abd: Soft, NT, ND. J/p drains output s/s  Skin: warm and well-perfused.     Del Real in place.    Assessment/Plan:   89F with perforated duodenal ulcer s/p exploratory laparoscopy, primary duodenal ulcer repair, Juan J patch 6/18/17.      -Perforated duodenal ulcer: Hopeful for spontaneous seal with drains and NPO. Ct results show decreasing amount of free intra abdominal fluid and no drainable collections. Possibility of leak not excluded. Will follow up with further imaging.   - Cardiology consulted for afib. They advised to continue current metoprolol regimen as long as rates are controlled. Stable today  -WBC trended upwards yesterday, stable today. Procalcitonin levels dropping still.. Continue ertapenem therapy.  - Prophylaxis: PPI, PCDs, and heparin SQ  - Pulmonary toilet, mobilize. Oxygenation stable well on   - PT/OT        Patient discussed with Dr. Israel Zimmerman,   General Surgery, PGY-1  Pg [phone]

## 2017-06-25 NOTE — PLAN OF CARE
Problem: Goal Outcome Summary  Goal: Goal Outcome Summary  PT 6B: Cancel- pt with many trips to bathroom and very fatigued. Will reschedule.

## 2017-06-25 NOTE — PROVIDER NOTIFICATION
Surgery cross-cover notified that patient has pulled her NG tube out. She is currently A & O x 4 and Mitts have been placed. MD has asked RN to replace NG tube and we will order a STAT portable x-ray once placed to verify placement. RN acknowledged.

## 2017-06-26 NOTE — PROGRESS NOTES
CLINICAL NUTRITION SERVICES - REASSESSMENT NOTE     Nutrition Prescription    RECOMMENDATIONS FOR MDs/PROVIDERS TO ORDER:  - Adv diet as appropriate, please contact RD/ or consult when/if team would like to wean/cycle CPN.  - Continue to monitor lytes    Malnutrition Status:    - Non-severe malnutrition in the context of acute on chronic illness    Recommendations already ordered by Registered Dietitian (RD):  - none at this time    Future/Additional Recommendations:  - Continue to monitor alk phos for need to decrease dextrose or cycle TPN     EVALUATION OF THE PROGRESS TOWARD GOALS   Diet: NPO  Nutrition Support: TPN regimen as follows (per 60 dosing weight): 1440mL, 130g dextrose, 90g amino acids + 250mL 20% lipids 5 x per week, pending labs (Mg++/K+ WNL and phos >1.9) advance by 40g dextrose/day to goal of 210g. This regimen provides: 1431kcals, 90g protein, 210g carbohydrate (2.4 mg/kg/min) and 28% fat daily    Intake: Pt has been at goal over the past week.      NEW FINDINGS   Weight: Wt flux most likely d/t fluids  Labs: TA (H), Alk phos: 282 (H), BUN: 33 (H), Cr: .51 (L)  GI: -Perforated duodenal ulcer: Hopeful for spontaneous seal with drains and NPO. Ct results show decreasing amount of free intra abdominal fluid and no drainable collections. Possibility of leak not excluded. Will follow up with further imaging. Will wait for Wednesday to perform contrast study. NG to LIS with brown/clear output, multiple loose BMs, Pt very fatigued. On  pt accidentally pulled out her NG, was replaced that day    MALNUTRITION  % Intake: No decreased intake noted- pt receiving full nutrition through TPN  % Weight Loss: None noted  Subcutaneous Fat Loss: Facial region:  mild, Upper arm:  Mild and Lower arm:  mild  Muscle Loss: Temporal:  Mild-moderate, Facial & jaw region:  mild and Upper/ Lower arm  (forearm):  moderate  Fluid Accumulation/Edema: None noted  Malnutrition Diagnosis: Non-severe malnutrition in the  context of acute on chronic illness    Previous Goals   Total avg nutritional intake to meet a minimum of 25 kcal/kg and 1.5 g PRO/kg daily (per dosing wt 60 kg).  Evaluation: Met    Previous Nutrition Diagnosis  Altered GI function related to GI perforation and prolonged NPO diet order post op as evidenced by need for TPN    Evaluation: No change    CURRENT NUTRITION DIAGNOSIS  Altered GI function related to GI perforation and prolonged NPO diet order post op as evidenced by need for TPN       INTERVENTIONS  Implementation  Collaboration with other providers- 6B rounds    Goals  Total avg nutritional intake to meet a minimum of 25 kcal/kg and 1.5 g PRO/kg daily (per dosing wt 60 kg).    Monitoring/Evaluation  Progress toward goals will be monitored and evaluated per protocol.    Fallon Sebastian, RD, MS, LD  6B- Pager: [phone]

## 2017-06-26 NOTE — PLAN OF CARE
Problem: Goal Outcome Summary  Goal: Goal Outcome Summary  Outcome: Improving  /75 (BP Location: Left arm)  Pulse 93  Temp 97.2  F (36.2  C) (Axillary)  Resp 16  Wt 62 kg (136 lb 11 oz)  LMP  (LMP Unknown)  SpO2 97%  BMI 23.65 kg/m2     VSS. Afib rates 90s-140s. BPs stable, RA. Denies pain-although abdomen tender with palpation. A&O x4, forgetful at times. NG to LIS, NPO status. Abdominal drains x2 with serous output. Del Real with adequate output. No BM this shift. R PICC with TPN. Up with 2 assist. Continue to monitor.

## 2017-06-26 NOTE — PLAN OF CARE
Problem: Goal Outcome Summary  Goal: Goal Outcome Summary     OT-6b: Pt mod A for sit>stand, tolerating 3 x 1 min bouts of standing exercise, limited by fatigue primarily, but tolerating well overall. Rec d/c to TCU.

## 2017-06-26 NOTE — PLAN OF CARE
"Problem: Goal Outcome Summary  Goal: Goal Outcome Summary  PT 6B: Pt completes bed mobility and sit<>stand transfers with ModA of 1. Pt with posterior lean while seated EOB and when standing - needs ModA to maintain upright posture with fww. Ambulates ~12 ft in room to/from bathroom with Ryley for balance and fww. Fatigues quickly with activity. Continues to be in Afib - HR up to 150's max with activity (pt reports feeling \"fine\") and RN aware. SpO2 89-91% on RA with activity.      Recommend discharge to TCU once medically appropriate.       "

## 2017-06-26 NOTE — PROGRESS NOTES
Surgery Progress Note  06/26/2017  Subjective/Interval History:  More alert today. Vitals stable. No nausea, getting TPN    Objective:  Temp:  [96.7  F (35.9  C)-98.5  F (36.9  C)] 97.2  F (36.2  C)  Heart Rate:  [] 123  Resp:  [14-20] 16  BP: ()/(34-91) 107/75  SpO2:  [95 %-98 %] 97 %    General appearance: in NAD  Pulm: Non-labored breathing; saturating well on RA  Abd: Soft, NT, ND.  Skin: warm and well-perfused.     Del Real and NG tube in place.  Drain outputs s/s.    Assessment/Plan:   3F with perforated duodenal ulcer s/p exploratory laparoscopy, primary duodenal ulcer repair, Juan J patch 6/18/17.      -Perforated duodenal ulcer: Hopeful for spontaneous seal with drains and NPO. Ct results show decreasing amount of free intra abdominal fluid and no drainable collections. Possibility of leak not excluded. Will follow up with further imaging. Will wait for Wednesday to perform contrast study.  - Afib stable, no metoprolol yesterday  -WBC stable. Procalcitonin levels dropping still, third day in a row. Continue ertapenem therapy.  - Prophylaxis: PPI, PCDs, and heparin SQ  - Pulmonary toilet, mobilize. Oxygenation stable well on   - PT/OT        Patient discussed with Dr. Marshall, chief resident     Gigi Zimmerman, DO  General Surgery, PGY-1  Pg [phone]

## 2017-06-26 NOTE — PROGRESS NOTES
RT progress Note:   Patient remains on room air with SPO2 95-98%. B.S coarse.  Mod to strong productive cough. She was able to cough out small   Frothy white secretion. Patient has been declining Mucomyst neb  Complaining oral irritation from it. P: Mucomyst was discontinued   And duo neb was changed to PRN per [facility] protocol. Will continue to monitor.

## 2017-06-26 NOTE — PLAN OF CARE
Problem: Goal Outcome Summary  Goal: Goal Outcome Summary  Outcome: No Change  BP (!) 127/91 (BP Location: Left arm)  Pulse 93  Temp 98  F (36.7  C) (Oral)  Resp 18  Wt 62 kg (136 lb 11 oz)  LMP  (LMP Unknown)  SpO2 96%  BMI 23.65 kg/m2     Remains a-fib with rates 's. All other vitals stable. Afebrile. Alert and oriented x 4 with some forgetfulness. RA with coarse lung sounds. YOSELYN x 2 with small serous output. Abdominal binder on. Del Real in place with good UOP. Incontinent of several small-medium loose stools. Repositioned q 2 hours. No pain. NG to LIS with brown/clear output. NPO. No n/v. Uses call light. Continue to monitor.

## 2017-06-27 NOTE — PLAN OF CARE
Problem: Goal Outcome Summary  Goal: Goal Outcome Summary  Outcome: No Change  /77 (BP Location: Left arm)  Pulse 93  Temp 98.1  F (36.7  C) (Axillary)  Resp 16  Wt 60.7 kg (133 lb 13.1 oz)  LMP  (LMP Unknown)  SpO2 98%  BMI 23.15 kg/m2     Neuro: A&Ox3-4. Pt is forgetful at times, occassionally unsure of time but easily reoriented. Soft call light ordered- per pt she struggles to hit the light. NG to LIS with moderate amount of brown output.    Cardiac: afib. VSS. AF.           Respiratory: Sating >95% on RA. Productive cough of white sputum,   GI/: Adequate urine output. BM X1  Diet/appetite: TPN and Lipids running, NPO  Activity:  Assist of 1-2, up to chair and in commode  Pain: At acceptable level on current regimen.   Skin: Intact, no new deficits noted.     R: Continue with POC. Notify primary team with changes.

## 2017-06-27 NOTE — PLAN OF CARE
Problem: Goal Outcome Summary  Goal: Goal Outcome Summary  Outcome: No Change  Blood pressure 94/65, pulse 93, temperature 97.7  F (36.5  C), temperature source Oral, resp. rate 20, weight 60.7 kg (133 lb 13.1 oz), SpO2 93 %, not currently breastfeeding.    Pt VSS, BP running soft this afternoon, Metoprolol held. HR A. Fib.  Oriented x 4, but forgetful, lethargic earlier today but more awake now.  Pt c/o some abdominal pain, PRN Tylenol given.  NG To LIS, thick brown output.  TPN infusing.  Del Real in place with good UOP.  Up to commode x 1, loose stool.  CXR and CT done today.  Up to chair x 1. Call light within reach.  Plan to transfer pt to , waiting to give report.  Continue with plan of care.

## 2017-06-27 NOTE — PLAN OF CARE
Problem: Goal Outcome Summary  Goal: Goal Outcome Summary  Outcome: No Change  Blood pressure 138/85, pulse 93, temperature 98.6  F (37  C), temperature source Axillary, resp. rate 18, weight 62 kg (136 lb 11 oz), SpO2 97 %, not currently breastfeeding.    Neuro: A&Ox4. Forgetful and confused at times.   Cardiac: Afib. HR in 110's.   Respiratory: Sating 98% on RA, lungs sounds are coarse with crackles, encouraged pt to cough and deep breath.   GI/: Adequate urine output, valentino catheter in place.  Diet/appetite: Tolerating NPO diet with TPN and lipids.   Activity:  Assist of 2 to reposition in bed.   Pain: At acceptable level on current regimen. Denies pain.   Skin: Intact, no new deficits noted. Dressings to abdominal drains clean dry and intact.      R: Continue with POC. Notify primary team with changes.

## 2017-06-27 NOTE — PROGRESS NOTES
Surgery Progress Note  06/27/2017     Subjective/Interval History:  Throat irritated by tube.   Got out of bed yesterday.  Working on IS, pulm toilet.    Objective:  Temp:  [96.7  F (35.9  C)-98.6  F (37  C)] 98.1  F (36.7  C)  Heart Rate:  [] 96  Resp:  [14-20] 16  BP: ()/(34-92) 121/77  SpO2:  [95 %-98 %] 98 %    General appearance: in NAD  Pulm: Non-labored breathing; saturating well on RA  Abd: Soft, NT, ND. NG in place.   Skin: warm and well-perfused.     Del Real.  Drain outputs s/s.    Labs reviewed  c diff neg  Today's labs pending    Assessment/Plan:   3F with perforated duodenal ulcer s/p exploratory laparoscopy, primary duodenal ulcer repair, Juan J patch 6/18/17.      -Perforated duodenal ulcer: Hopeful for spontaneous seal with drains and NPO. Ct results show decreasing amount of free intra abdominal fluid and no drainable collections. Possibility of leak not excluded. Will follow up with further imaging. Will wait for Wednesday to perform contrast study.  - Afib stable, sched and PRN metoprolol.   - ID - Trend WBC and procalcitonin. Ertapenem (6/23-).  - Prophylaxis: PPI, PCDs, and heparin SQ  - Pulmonary toilet, mobilize. Oxygenating.  - PT/OT  - Urinary retention - Del Real catheter (6/25-)  - 10mg lasix today  - contrast study on Wednesday to eval for ongoing leak     Patient discussed with Dr. Marshall, chief resident     Royer Linder MD  General Surgery PGY-2  Pager [phone]

## 2017-06-27 NOTE — PLAN OF CARE
"Problem: Goal Outcome Summary  Goal: Goal Outcome Summary  PT 6B: Pt needing encouragement for OOB activity today. Completes supine>sitting EOB with ModA. Sit<>stands with ModA of 1 and fww. Pivot transfers to chair with ModA of 1 and fww. Pt with posterior lean both in sitting and standing - needs assist to maintain upright posture and prevent loss of balance. Declines any ambulation today d/t feeling \"weak\" despite encouragement. HR up to 123 bpm with activity.      Recommend discharge to TCU once medically appropriate to progress safety and IND with functional mobility.        "

## 2017-06-27 NOTE — PROGRESS NOTES
Progress Note:    Hospital Day: 9  Date of Initial SW Assessment: 06/20/2017    Data: Adia Del Valle is a 89 yo male admitted to [facility] 06/18/2017.    Intervention: Writer and Alisha LEECC met w/pt to discuss dc plans.    Assessment: PT/OT recommending TCU as pt was not able to ambulate today to the bathroom. Pt was initially adamantly declining TCU but is now more agreeable. Provided pt with list of Medicare certified SNFs to offer choice of facility. Pt did express some interest in [facility] so referral was made today. Pt stated she will discuss with her family and friends and declined an offer for SW or CC to contact pt's family.    Pt lives at [facility] and has a lot of friends in the building and a system where pt has to check in by 9am or someone is sent to check on her. Pt does live alone though and would not have family available for 24/7 assistance.    Plan:  -Discharge plans in progress: Referral out to [facility] and pt is reviewing SNF list for additional referral options. Per MD, possible dc end of week depending on contrast study in the upcoming days.  -Barriers to discharge plan: medical stability and TCU placement  -Follow up plan: SW will continue to follow and assist as needed.    PHILLIP Vaughn, [facility]  6B Intermediate Care Unit   Phone: [phone]  Pager: [phone]

## 2017-06-27 NOTE — PLAN OF CARE
Problem: Goal Outcome Summary  Goal: Goal Outcome Summary     OT-5b: Mod A for supine>sit and sit>stand. Stood for 3 x 30 second bouts with FWW, able to side step along bedside, and march in place briefly. Limited by fatigue, needing encouragement for continued participation, but VSS with activity. Rec d/c to TCU.

## 2017-06-28 NOTE — PROVIDER NOTIFICATION
Text page sent to [phone]: Pt is insisting on having a valentino put back in. Has been up several times to commode throughout night with small amounts of urine. Let me know, thanks.    Intervention: Will follow up with day team

## 2017-06-28 NOTE — PLAN OF CARE
"Problem: Goal Outcome Summary  Goal: Goal Outcome Summary  Outcome: No Change  Intermittently forgetful. Bed alarm on for safety. Soft BP, a.m dose of metoprolol held. Soft touch call light ordered. NG to LIS with small amount of thick brown output. YOSELYN drains intact with scant output. TPN and lipids infusing per orders. Valentino catheter pulled last evening, pt has been awake most of the night up to commode/using bedpan voiding small amounts. See flowsheets for output/bladder scan amounts. Pt \"really, really insists\" on putting valentino back in. No needs at this time.  Will report to oncoming staff.  Plan: Contrast study today to assess leakage.          "

## 2017-06-28 NOTE — PLAN OF CARE
Problem: Goal Outcome Summary  Goal: Goal Outcome Summary  Outcome: No Change  Voided only 75 on bedpan despite several request to have to go.  Bladder scan x 1 for 413 cc, straight cath x 1 for 600 cc urine.  Appears to have hospital delirium, seeing things that aren't there, talking to people and twitching and grabbing.  Needing reminders to not pull at NG tube.  Dozing in between cares.  Sepsis alert triggered at 1330, waiting for results.  No c/o pain.  Left heel reddened, but blanchable, now elevated on 2 pillows.  Continue to closely monitor.  Possible discharge to [facility] Friday.

## 2017-06-28 NOTE — PROGRESS NOTES
Surgery Progress Note  06/28/2017     Subjective/Interval History:  Del Real removed yesterday. Has been up to commode with adequate UOP. CT demonstrated continued leak yesterday. +Bowel function.    Objective:  Temp:  [95.9  F (35.5  C)-98.1  F (36.7  C)] 95.9  F (35.5  C)  Heart Rate:  [] 61  Resp:  [16-20] 18  BP: ()/() 100/65  SpO2:  [93 %-100 %] 96 %    General appearance: in NAD, NG in place to suction  Pulm: Non-labored breathing  CV: hemodynamically stable.   Abd: Soft, appropriately TTP, ND. Incisions c/d/i.  Skin: warm and well-perfused.     Drain outputs mildly bilious    Labs reviewed  c diff neg  Labs pending    Assessment/Plan:   89F with perforated duodenal ulcer s/p exploratory laparoscopy, primary duodenal ulcer repair, Juan J patch 6/18/17.      - Perforated duodenal ulcer: CT demonstrates continued leak. Continue non operative management. Anticipating spontaneous sealing. Continue NG to LIS. Continue TPN and abx.   - Afib stable, sched and PRN metoprolol.   - ID - Trend WBC and procalcitonin. Ertapenem (6/23-).  - Prophylaxis: PPI, PCDs, and heparin SQ  - Pulmonary toilet, mobilize. Oxygenating.  - PT/OT    Patient discussed with chief resident     Michael Perez, PGY2  [phone]

## 2017-06-28 NOTE — PROGRESS NOTES
"Social Work Services Progress Note    Hospital Day: 11  Date of Initial Social Work Evaluation:  6/20/2017  Collaborated with:  Chart review, 6B SW, bedside RN, Quinton Sandhu (here visiting patient)    Data:  Adia is a 89 year old female, admitted to [facility] 6/18/2017 for . She comes from home, independent, but is being recommended for TCU stay upon DC for bed mobility, transfers, ambulation, stamina, strength, and independence with functional mobility.     Adia has expressed a strong desire NOT to go to TCU. It appears she is worried that if she is sent to a nursing facility for TCU she will \"never leave\" the nursing facility and be moved into their long term care. 6B SW began DC planning with this patient, and discussed with patient the [facility] option- as there is NO LTC at this facility. She was more agreeable to this, and did not elect any community options for TCU referrals.     Intervention:  [facility] is reviewing this patient. They have not yet accepted or denied for admission, but SW discussed with admissions this AM- they are continuing to follow and are aware patient is anticipated to be ready for DC on Friday. PETROS and admissions also discussed the possibility of ARU for this patient. Admissions is reviewing.     PETROS paged the PT (@ 1:07 pm) who saw Adia this AM asking if she felt ARU recs would also be appropriate.- She responded at 1:30 PM stating this patient may well be a good ARU candidate, pending her stamina, she tired very easily this morning after ambulating a short distance. She is addending her note for TCU vs ARU.     PETROS paged Surgery MD, Chris @ [phone] at 1:35 PM requesting he place a consult for PM&R for further evaluation.    Assessment: PETROS met with patients quinton Sandhu- [phone], he reports he and his siblings Pernell and Fco are very involved with patient, as well as patients brother Nilo. Edson reports Adia has been doing \"okay\" at home, but they have increasing concerns around her \"eye " "sight, and her leg strength\". SW discussed this being an appropriate time to initiate some further home services VS a higher level of care- such as medication set up, possible assistance with cooking/cleaning (if/as needed), as assessing for additional appropriate services. After patient DC's from [facility]U, she can be referred to  Home Care for weekly medication set up, RN visits, SW visits to assess level of functioning at home, and some follow up home PT/OT. SW will also provide Edson with resources for \"[facility]\" to follow up with incase they are seeking further placement help, i.e. [facility] vs private duty home nursing/aides.     Pt lives alone in a handicap accessible apt at [facility] in [facility].  Previous Functional Status:  Independent but pt uses a 4WW and cane for ambulation. Pt has restaurants, stores, post office, and public transportation within walking distance from her apt. Pt's co-op apt has a notification board where pt checks in by 9am otherwise someone is sent to check on her.    Plan:    Anticipated Disposition:  Facility:  [facility]CU, VS ARU (?) Friday/Saturday    After patient DC's from [facility], she can be referred to  Home Care for weekly medication set up, RN visits, SW visits to assess level of functioning at home, and some follow up home PT/OT.       Barriers to d/c plan:  Contrast study performed today to assess drainage, medical readiness- per [facility] admissions, patient must be free from IV meds, PRN IV meds, and Chest tube, she also states the hospitalist wants a detailed plan for the drain and antibiotics going forward prior to DC.      Follow Up:  sw following, will need PAS and HE ride.    Clare AGUILERA, MSW  5B  (Medical/Surgical)  Phone: [phone]  Pager: [phone]     "

## 2017-06-28 NOTE — PLAN OF CARE
Problem: Goal Outcome Summary  Goal: Goal Outcome Summary  PT: Pt seen this morning for progression of IND with functional mobility.  Pt required modA for bed mobility and min-modA for sit<>stand transfers with use of walker.  Pt ambulated 5-6' x 2 with use of FWW and min-modA from PT for stability, seated rest break taken between bouts.  Pt also completed supine LE strengthening exercises.  Recommend ARU vs.TCU upon d/c pending ability to tolerate therapies in order to increase strength and IND with functional mobility.

## 2017-06-28 NOTE — PLAN OF CARE
Problem: Goal Outcome Summary  Goal: Goal Outcome Summary  Outcome: No Change  Pt is A&O, but is intermittently forgetful, bed alarm on for safety. Soft BP, evening metoprolol held. OVSS on RA. NG to LIS with small amount of thick brown output. YOSELYN drains intact with scant output. TPN and lipinds infusing per orders. Del Real catheter pulled, pt has voided x2 following removal. Pt is up to commode with assist of 1-2 for steadiness, and has loose stools. No acute concerns.     Plan for contrast study tomorrow to assess leakage. Continue to monitor and follow the POC.

## 2017-06-28 NOTE — PLAN OF CARE
Problem: Goal Outcome Summary  Goal: Goal Outcome Summary  OT/5B: Completed bed mobility supine to seated EOB with mod A. Completed g/h and ADL tasks at EOB with tray table set up, rest breaks as needed. Provided education on EC/WS and pacing skills.           Per plan established by the OT, the recommendation for dc location is TCU.

## 2017-06-28 NOTE — PHARMACY-ADMISSION MEDICATION HISTORY
Admission medication history interview status for the 2017 admission is complete. See Epic admission navigator for allergy information, pharmacy, prior to admission medications and immunization status.     Medication history interview sources:  Patient    Primary Pharmacy: [facility]    Changes made to PTA medication list (reason)  Added:   - senna-docusate 8.6-50 m tab twice daily as needed (per patient)  - acetaminophen 500 mg: one tab daily at night as needed (per patient)  Deleted:   - calcium/vitamin D (per patient)  - coenzyme Q10 (per patient)  - saccharomyces boulardii: 100 mg by mouth daily (per patient)  Changed:   - Preservision multivitamin from 2 capsules daily to one capsule daily  - naproxen from 1 tablet twice daily to 2 tablets once daily in the morning    Additional medication history information (including reliability of information, actions taken by pharmacist):  Patient was able to provide a verbal list of medications she is taking at home, including med name and dosing. Patient states that hydrochlorothiazide is the only prescription med she is currently taking and she receives this medication through [facility].       Prior to Admission medications    Medication Sig Last Dose Taking? Auth Provider   ASPIRIN PO Take 81 mg by mouth daily 2017 at Unknown time Yes Unknown, Entered By History   ACETAMINOPHEN PO Take 500 mg by mouth nightly as needed for pain Past Month at Unknown time Yes Unknown, Entered By History   Multiple Vitamins-Minerals (MULTIVITAMIN ADULT PO) Take 1 tablet by mouth every morning 2017 at Unknown time Yes Unknown, Entered By History   senna-docusate (SENOKOT-S;PERICOLACE) 8.6-50 MG per tablet Take 1 tablet by mouth 2 times daily as needed for constipation Past Month at Unknown time Yes Unknown, Entered By History   hydrochlorothiazide (HYDRODIURIL) 25 MG tablet TAKE 1/2 TABLET(12.5 MG) BY MOUTH DAILY 2017 at Unknown time Yes Heide Murphy  M, NP   Naproxen Sodium (ALEVE PO) Take 440 mg by mouth every morning with breakfast 6/17/2017 at Unknown time Yes Reported, Patient   polyethylene glycol (MIRALAX/GLYCOLAX) powder Take 17 g by mouth daily as needed for constipation  6/17/2017 at Unknown time Yes Reported, Patient   Multiple Vitamins-Minerals (PRESERVISION AREDS 2) CAPS Take 1 capsule by mouth daily  6/17/2017 at Unknown time Yes Heide Murphy, NP   Tetrahydrozoline HCl (EYE DROPS OP) Apply 1 drop to eye 2 times daily as needed EYE DROPS  6/17/2017 at Unknown time Yes Reported, Patient         Medication history completed by: Shahram Galaviz, PharmD student

## 2017-06-29 NOTE — PLAN OF CARE
"Problem: Goal Outcome Summary  Goal: Goal Outcome Summary  Alert however disoriented to times and situation. Patient also hallucinating overnight stating that she is seeing people in the room. Pt tried to get off bed a few times during the night indicating she \"needs to go to the bathroom.\" Bed alarm active. Assist of two with cares. Void X3 overnight with recent BS at 240 cc. BM X2; watery and brown in color. TPN/lipids infusing in double lumen PICC line, other lumen set to tko. PIV SL. NG to LIS. Vs wnl on 2L of o2 via nasal cannula d/t pt sats sitting on high 80s low 90s on RA. Denies pain. , 144 and 131; insulin coverage administered per poc. YOSELYN drains/dressings intact. Continues on telemetry monitoring.  R: Continue to monitor cognitive status and urine output. Continue with POC.      "

## 2017-06-29 NOTE — CONSULTS
[facility]   PM&R CONSULT    Consulting Provider: Chris Rodriguez   Reason for Consult: Assessment of rehabilitation   Location of Patient: 5B  Date of Encounter: 6/29/2017       ASSESSMENT/PLAN:    Ms. Adia Del Valle is a Right handed 89 year old yo female who presents with altered mental status and deconditioning debility.   Per my coordination with her nurse and Nursing aide, she is requiring mod assist with repositioning. She is disoriented. She most likely has UTI, which may be contributing to the deficits noted on exam today. She is also incontinent of bowel and bladder. She is hallucinating.   At this time, she is not appropriate for an ARU setting, where she will need to participate in 3 hrs of intense therapies daily with recovery being shown daily.   She is a resident of a senior living apartment, where services are not available. She lived there independently, with a set up where she has to check in every morning for safety. However there was concern for the safety voiced by her family even in the that situation with regards to her strength and vision ability.     At this time, she is appropriate for a TCU setting on discharge. however in the event of improved cognition please re consult us. Depending on the trajectory of her recovery she m,ay not be able to return to her previous level of function and environment upon discharge   Thank you for consulting the PM&R Department.   For any questions, please feel free to page me at [phone]       Daria Calderon MD   Department of PM&R    HPI:    Adia Del Valle is a 89 year old with PMH of HTN, HL, Type II DM, Cardiomegaly, and OA who was transferred from [place] 6/18 with three days of abdominal pain and imaging consistent with perforated viscus, presumed to be gastric based on imaging.   She was taken to OR for exploratory laparoscopy and underwent a repair of duodenal ulcer primarily and with Juan J patch 6/18.    Post op she developed sudden onset of afib w/ RVR, treated with metop 5mg iv x 1 but SBP 80s. She was seen by cardiology in consult for post of afib.     CT demonstrated continued leak yesterday. Current plan to continue non operative management. Anticipating spontaneous sealing. She is on NG to LIS, TPN and abx.     ID is onboard and monitoring the white count,she is on Ertapenem (6/23-).  Prophylaxis: PPI, PCDs, and heparin SQ  [place] was removed, she has been incontinent. Has been up to commode with adequate UOP.     Urine was noted to be foul smelling and has been sent for cultures    PREVIOUS LEVEL OF FUNCTION   At baseline, she was ambulating with a walker, but was independent with basic adl's and iadl's.       CURRENT FUNCTION   PT  Pt required modA for bed mobility and min-modA for sit<>stand transfers with use of walker.    Pt ambulated 5-6' x 2 with use of FWW and min-modA from PT for stability, seated rest break taken between bouts.    Pt also completed supine LE strengthening exercises.       OT  In OT, she completed bed mobility supine to seated EOB with mod A.   Completed g/h and ADL tasks at EOB with tray table set up, rest breaks as needed.      CURRENT RN NEEDS   Significant nursing needs, she is being repositioned q 2hrs.   She is incontinent of bowel and bladder  Limited by fatigue   Noted to be hallucinating  TPN ongoing   Oxygen via NC  NG tube to suction      SOCIAL HISTORY/Home Setting/Support:      Social History     Social History     Marital status: Single     Spouse name: N/A     Number of children: N/A     Years of education: N/A     Social History Main Topics     Smoking status: Never Smoker     Smokeless tobacco: Never Used     Alcohol use No     Drug use: No     Sexual activity: No     Other Topics Concern     Parent/Sibling W/ Cabg, Mi Or Angioplasty Before 65f 55m? No     Social History Narrative       Past Medical History:  Past Medical History:   Diagnosis Date     Cardiomegaly       Cholecystitis, unspecified      Disorder of bone and cartilage, unspecified      Generalized osteoarthrosis, unspecified site      Lump or mass in breast      Pure hypercholesterolemia      Unspecified essential hypertension      Unspecified venous (peripheral) insufficiency        Current Medications:  Current Facility-Administered Medications   Medication     parenteral nutrition - ADULT compounded formula CYCLE     ipratropium - albuterol 0.5 mg/2.5 mg/3 mL (DUONEB) neb solution 3 mL     ertapenem (INVanz) 1 g vial to attach to  mL bag     metoprolol (LOPRESSOR) injection 5 mg     potassium chloride SA (K-DUR/KLOR-CON M) CR tablet 20-40 mEq     potassium chloride (KLOR-CON) Packet 20-40 mEq     potassium chloride 10 mEq in 100 mL intermittent infusion     potassium chloride 10 mEq in 100 mL intermittent infusion with 10 mg lidocaine     potassium chloride 20 mEq in 50 mL intermittent infusion     sodium phosphate 15 mmol in D5W intermittent infusion     sodium phosphate 20 mmol in D5W intermittent infusion     sodium phosphate 25 mmol in D5W intermittent infusion     lipids (INTRALIPID) 20 % infusion 250 mL     HYDROmorphone (DILAUDID) injection 0.2 mg     lidocaine (LIDODERM) 5 % Patch 1 patch    And     lidocaine (LIDODERM) patch REMOVAL    And     lidocaine (LIDODERM) patch in PLACE     lidocaine 1 % 1 mL     lidocaine (LMX4) kit     sodium chloride (PF) 0.9% PF flush 10-20 mL     heparin lock flush 10 UNIT/ML injection 5-10 mL     heparin lock flush 10 UNIT/ML injection 5-10 mL     dextrose 10 % 1,000 mL infusion     naloxone (NARCAN) injection 0.1-0.4 mg     lidocaine (LMX4) kit     sodium chloride (PF) 0.9% PF flush 5-50 mL     metoprolol (LOPRESSOR) injection 5 mg     heparin sodium PF injection 5,000 Units     acetaminophen (TYLENOL) Suppository 650 mg     pantoprazole (PROTONIX) 40 mg IV push injection     prochlorperazine (COMPAZINE) injection 5 mg    Or     prochlorperazine (COMPAZINE) tablet  5 mg    Or     prochlorperazine (COMPAZINE) Suppository 12.5 mg     glucose 40 % gel 15-30 g    Or     dextrose 50 % injection 25-50 mL    Or     glucagon injection 1 mg     insulin aspart (NovoLOG) inj (RAPID ACTING)     ondansetron (ZOFRAN-ODT) ODT tab 4 mg    Or     ondansetron (ZOFRAN) injection 4 mg     magnesium sulfate 2 g in NS intermittent infusion ([facility] or [facility])     magnesium sulfate 4 g in 100 mL sterile water (premade)     hydrALAZINE (APRESOLINE) injection 5 mg             Labs   Lab Results   Component Value Date    WBC 17.1 (H) 06/29/2017    HGB 11.6 (L) 06/29/2017    HCT 35.1 06/29/2017    MCV 90 06/29/2017     (H) 06/29/2017     Lab Results   Component Value Date     06/29/2017    POTASSIUM 4.3 06/29/2017    CHLORIDE 101 06/29/2017    CO2 26 06/29/2017     (H) 06/29/2017     Lab Results   Component Value Date    GFRESTIMATED >90  Non  GFR Calc   06/29/2017    GFRESTBLACK >90   GFR Calc   06/29/2017     Lab Results   Component Value Date    AST 19 06/26/2017    ALT 28 06/26/2017    ALKPHOS 280 (H) 06/29/2017    BILITOTAL 0.6 06/26/2017    BILICONJ 0.0 07/28/2009     Lab Results   Component Value Date    INR 1.11 06/26/2017     Lab Results   Component Value Date    BUN 39 (H) 06/29/2017    CR 0.51 (L) 06/29/2017         ON EXAMINATION:  Vitals:    06/28/17 1747 06/28/17 2210 06/29/17 0110 06/29/17 0644   BP: 98/62 122/70 113/57 90/73   BP Location:  Left arm Left arm Left arm   Pulse:  94 75 84   Resp:  16     Temp:  97  F (36.1  C)  96.7  F (35.9  C)   TempSrc:  Oral  Oral   SpO2: 98% 98%  96%   Weight:           Physical Exam:  Blood pressure 90/73, pulse 84, temperature 96.7  F (35.9  C), temperature source Oral, resp. rate 16, weight 60.8 kg (134 lb 0.6 oz), SpO2 96 %, not currently breastfeeding.    GEN: NAD, lying comfortably in bed  NG tube t suction with blood tinged drainage   Oxygen via nasal cannula   She is disoriented, but awake    She is cooperative  She states that she needs to go to the bank, seems unaware that she is in the hospital.   Speech is clear   Very [place]   Comprehension is intermittent able to follow simple commands   HEENT: NCAT  MSK: generalized muscle atrophy noted  ABD: soft, non tender, pos BS  NEURO:   She is able to move all 4 extremities against gravity .   EXT: no edema bilaterally  Thank you for the consult. Please call for any questions. Pager number [phone]     Daria Calderon       Total of 70 min spent in this encounter, more than 50% in counseling and coordination.

## 2017-06-29 NOTE — PLAN OF CARE
Problem: Goal Outcome Summary  Goal: Goal Outcome Summary  OT/5B: Pt presented with some confusion and hallucinating. Performed approx 50% of command following for UE ROM exercises-frequent redirection and Benton assist. Attempt to engage in simple g/h tasks-pt hallucinating with Benton assist and difficult to redirect to task. Nursing notified.            Per plan established by the OT, the recommendation for dc location is TCU.

## 2017-06-29 NOTE — PLAN OF CARE
Problem: Goal Outcome Summary  Goal: Goal Outcome Summary  Outcome: No Change  Pt is A&O at baseline, but is intermittently forgetful and hallucinatory, often having conversations with family members not present. Bed alarm on for safety. Soft BP, OVSS on RA. NG to LIS with small amount of thick brown output. YOSELYN drains intact with scant output. TPN cycling, and lipids infusing per orders. Pt used bedpan frequently this shift, with minimal results. No acute concerns.      Plan to d/c Friday or Saturday to ARU. Continue to monitor and follow the POC.

## 2017-06-29 NOTE — PLAN OF CARE
Problem: Goal Outcome Summary  Goal: Goal Outcome Summary  Outcome: Declining  Delirium increasing throughout the shift.  Incontinent of bowel and bladder. CDiff and UA negative.  Continues to pull at lines, needs to be redirected to stop pulling.  Visual hallucinations all day, MD aware, follow up for haldol or zyprexa order for tonight.  Need to promote sleep/wake cycle, all lights to be on during the day and off at night.  Continue with redirection and orientation.

## 2017-06-29 NOTE — PROGRESS NOTES
Surgery Progress Note  06/29/2017     Subjective/Interval History:   Has been up to commode with adequate UOP. Nurse noted foul smelling urinary discharge. CT demonstrated continued leak yesterday. +Bowel function.    Objective:  Temp:  [96.5  F (35.8  C)-98.3  F (36.8  C)] 96.7  F (35.9  C)  Pulse:  [] 84  Heart Rate:  [76-95] 95  Resp:  [16-18] 16  BP: ()/(57-73) 90/73  SpO2:  [92 %-98 %] 96 %    General appearance: in NAD, NG in place to suction  Pulm: Non-labored breathing.  CV: hemodynamically stable.   Abd: Soft, appropriately TTP, ND. Incisions c/d/i.  Skin: warm and well-perfused.     Drain outputs mildly bilious    Labs reviewed  c diff neg, re-ordered due to loose stools. Pending results  UA ordered, pending    UOP stable    Assessment/Plan:   89F with perforated duodenal ulcer s/p exploratory laparoscopy, primary duodenal ulcer repair, Juan J patch 6/18/17.      - Perforated duodenal ulcer: CT demonstrates continued leak. Continue non operative management. Anticipating spontaneous sealing. Continue NG to LIS. Continue TPN and abx.   - Afib stable, sched and PRN metoprolol.   - ID - Trend WBC and procalcitonin. Ertapenem (6/23-).  - Prophylaxis: PPI, PCDs, and heparin SQ  - Pulmonary toilet, mobilize. Oxygenating. Respiratory therapy to follow.   - PT/OT    Patient discussed with chief resident     Gigi Zimmerman DO  General Surgery PGY 1   [phone]

## 2017-06-30 NOTE — PLAN OF CARE
Problem: Goal Outcome Summary  Goal: Goal Outcome Summary  Outcome: No Change  [id number]: Moved closer to nursing station. Patient continues to be pleasantly confused. Mitts have stayed off and patient has not tried to pull at lines. Continues to be tachy at times with a-fib. Has been able to sleep since about 1700. Bed alarm on for safety. Continue to monitor and follow plan of care.

## 2017-06-30 NOTE — PLAN OF CARE
Problem: Goal Outcome Summary  Goal: Goal Outcome Summary  Outcome: Declining  D.  Pt. Continues to have hallucinations and pick at the air.  She used the commode 3 times and had 2 stools and voided twice.  No incontinence.  Vaginal bleeding was noted and passed on to the cross cover.  Later in the shift the bleeding had stopped.  Noted twice while wiping.  Pt. Took out her NG tube later in the shift.  No nausea or emesis noted.  Pt. Also triggered the sirs alert but lactic acid was 0.8.  Pt's Afib was alarming on the tele often going outside the parameters frequently.  Metoprolol was given as scheduled and midnight dose was given early.  Her telemetry is not alarming as much now.  Pt. Has been awake all day and is getting tired now.    Vitals have been up and down.    P. Continue to monitor tele. And vitals closely.  Continue plan of care.

## 2017-06-30 NOTE — PLAN OF CARE
Problem: Goal Outcome Summary  Goal: Goal Outcome Summary  OT/5B: Pt hallucinating and speaking to people not present in room throughout session, however, redirectable to tasks.Nephew present. Performed UE strength/ROM exercises with demos, redirection and tactile prompts. More engaged and redirectable than in previous session.        Per plan established by the OT, the recommendation for dc location is TCU.

## 2017-06-30 NOTE — PROGRESS NOTES
Surgery Progress Note  06/30/2017     Subjective/Interval History:  Pulled NG overnight. Continues to be delirious. +Bowel function.    Objective:  Temp:  [95.9  F (35.5  C)-98.9  F (37.2  C)] 98.9  F (37.2  C)  Pulse:  [] 115  Heart Rate:  [] 89  Resp:  [18-20] 20  BP: ()/(48-73) 118/66  SpO2:  [93 %-96 %] 96 %    General appearance: in NAD, delirious  Pulm: Non-labored breathing.  CV: hemodynamically stable.   Abd: Soft, appropriately TTP, ND. Incisions c/d/i.  Skin: warm and well-perfused.     Drain outputs mildly bilious with minimal outputs.     Labs pending.     UOP adequate    Assessment/Plan:   89F with perforated duodenal ulcer s/p exploratory laparoscopy, primary duodenal ulcer repair, Juan J patch 6/18/17.      - NG dislodged inadvertently overnight. Will hold off on replacing for now.   - Perforated duodenal ulcer: CT demonstrates continued leak. Continue non operative management. Anticipating spontaneous sealing. Continue TPN and abx.   - Afib stable, sched and PRN metoprolol.   - ID - Trend WBC and procalcitonin. Ertapenem (6/23-).  - Prophylaxis: PPI, PCDs, and heparin SQ  - Pulmonary toilet  - PT/OT    Patient discussed with chief resident    Michael Perez, PGY2  [phone]

## 2017-06-30 NOTE — PROGRESS NOTES
Surgery Cross-Cover Note  6/29/2017 10:30 PM    Was paged regarding patient pulling out her NG tube, PV bleeding and Afib episode with RVR.   Not having nausea or vomiting ( wasn't keen on idea of NG tube being put back)  Patient has no complain of CP, SOB or palpitations  She still has altered mental status but can be calmed down    /66  Pulse 115  Temp 98.9  F (37.2  C) (Axillary)  Resp 20  Wt 61.3 kg (135 lb 2.3 oz)  LMP  (LMP Unknown)  SpO2 96%  BMI 23.38 kg/m2    I/O last 3 completed shifts:  In: 290 [I.V.:290]  Out: 857 [Urine:300; Emesis/NG output:300; Drains:32; Other:225]    Exam:  Alert, comfortable  CV: irregular rhythm, peripheries warm  Lungs: clear  Abd: soft, non tender, not distended, ALF: non bloody stool  Diapers: had small amount of bleeding,   She was not compliant to let me do a vaginal exam on her    ECG: afib with RVR    Assessment/Plan:  Stable afib with RVR    - for IV metoprolol 5 mg stat  - Please inform if becomes haemodynamically unstable, or severe PV bleed    Edgar Moeller MD  PGY-1 General Surgery  Pager # [phone]

## 2017-06-30 NOTE — PLAN OF CARE
Problem: Goal Outcome Summary  Goal: Goal Outcome Summary  Outcome: Therapy, progress toward functional goals is gradual  PT/5B- Patient remains confused but direct-able, following simple instructions and participates well with therapy. Tranfers supine to sit with min A, sit to stand with mod A x 2 to FWW, significant retro leaning. Recommend up to the chair with lift at assist during day to assist with day/night schedule, may benefit from a sitter if up in the chair due to confusion.  Participates in assist LE supine exercises. Recommend TCU.

## 2017-06-30 NOTE — PROGRESS NOTES
Patient is alert but disoriented x4. Incontinent of urine x2 and stool x1, no blood observed in rupesh area. Mitts were on this morning, removed at 1230. Encouraged deep breathing with her during cares, 4-5 breaths at a time, 02 sats improved with this. Pt continues to hallucinate and talk to people who are not there, she tried to get out of bed and move around the room. Nephew came to visit this afternoon, appropriately concerned. Both heels are reddened, but still blanchable, heels raised above bed with pillow. Pt will move to a room closer to the desk area. Recommend closer monitoring, fall risk.

## 2017-06-30 NOTE — PROVIDER NOTIFICATION
UGO  Paged surgery cross cover about multiple items.  1 pt. Pulled her NG out  2) had some vaginal bleeding  3) tele was alarming very frequently with Afib  That went out of range often.  Dr. Moeller came and assessed the Pt. And ordered a EKG

## 2017-06-30 NOTE — PLAN OF CARE
Problem: Goal Outcome Summary  Goal: Goal Outcome Summary  Alert and continues with hallucinations about people in room and random objects. Mitts and BA present, pt was pulling on lines during the night. Vs wnl on 2L of o2. Denies pain. Voided a few times during the night, no blood observed in perineal area.  X2; insulin coverage administered. YOSELYN drains intact. Continues on tele. Awake throughout the night.  R: Continue to encourage rest, reorient, and continue with POC

## 2017-07-01 NOTE — PROGRESS NOTES
SW spoke to [facility].  Pt was accepted to [facility].  Pt has a CT scan which notes indicate is ordered and scheduled on 7/3.  SW services will remain available as needed.  Will facilitate transfer to [facility] when appropriate.  Narda Mckeon    Addendum:   SW spoke pt regarding the plan for [facility] instead of ARU according to PMR recs.  She is agreeable.  SW updated her whiteboard with info.  Also, PETROS called Hung and Alessia, pt's primary contacts to update on the plan.  They state they will visit tonight or tomorrow (from [place]).  DX: Perforated viscus [R19.8]      PAS completed at this time: [id number]    Narda Mckeon

## 2017-07-01 NOTE — PROGRESS NOTES
Surgery Progress Note  07/01/2017     Subjective/Interval History:  NG not replaced yesterday after pt dislodged tube previous night.   WBC remained stable and was AF.   +Stool    Objective:  Temp:  [96.5  F (35.8  C)-98.7  F (37.1  C)] 98.1  F (36.7  C)  Pulse:  [] 105  Resp:  [20] 20  BP: (103-122)/(53-79) 110/60  SpO2:  [91 %-97 %] 96 %    General appearance: in NAD, confused  Pulm: Non-labored breathing.  CV: hemodynamically stable.   Abd: Soft, appropriately TTP, ND. Incisions c/d/i. Drains in place.   Skin: warm and well-perfused.     Drains with minimal outputs.   UOP adequate, unmeasured voids x6    Labs reviewed  WBC 15.5 yesterday --> 16.1  Procalcitonin 0.11 --> 0.13  BMP ok    Assessment/Plan:   89F with perforated duodenal ulcer s/p exploratory laparoscopy, primary duodenal ulcer repair, Juan J patch 6/18/17.      - NG dislodged inadvertently overnight 6/29. Will hold off on replacing for now.   - Perforated duodenal ulcer: CT 6/27 demonstrates continued leak. Continue non operative management. Anticipating spontaneous sealing. Continue TPN, NPO, and abx. Repeat CT Abdomen with oral contrast on Monday 7/3 (ordered).  - Afib stable, HR stable low 100s. Sched metoprolol 5mg q6h and PRN.    - ID - Trend WBC and procalcitonin. Ertapenem (6/23-).  - Prophylaxis: PPI, PCDs, and heparin SQ  - Pulmonary toilet, RT consult, sched nebs  - PT/OT  - Dispo: eventual TCU per therapy recs when leak sealed/contained    Seen with Dr. Wagner.    Royer Linder MD  General Surgery PGY-2  Pager [phone]

## 2017-07-01 NOTE — PLAN OF CARE
Problem: Goal Outcome Summary  Goal: Goal Outcome Summary  [id number]     Pt alert, only oriented to self, VSS. Pt restless at beginning of shift, had difficulty keeping extremities in bed. BA on for safety. Heavy assist of 2 to commode, pt incontinent of B/B 2x. Lidoderm patch under abdominal binder. 2 YOSELYN drains intact, output scant. Pt NPO for aspiration risk, currently infusing TPN and lipids. Has productive cough, needs direction to spit into tissue and throw away. Currently on 3L 02. Will continue to monitor and alert MDs to any changes.

## 2017-07-01 NOTE — PLAN OF CARE
Problem: Goal Outcome Summary  Goal: Goal Outcome Summary  IP OT 5B:  Pt needing mod to max A during log roll and supine to sit transfers as well as min to mod A to sit EOB.  Pt unable to complete sit to stand transfer, and needing mod A to transition back to supine to use bed pan.  Pt answering all questions appropriately during session.     REC: Discharge to TCU once medically appropriate.  Pt significantly deconditioned with post surgical precautions, limiting independence and safety in ADLs and IADLs.

## 2017-07-01 NOTE — PLAN OF CARE
Problem: Goal Outcome Summary  Goal: Goal Outcome Summary  Outcome: Therapy, progress toward functional goals is gradual  BP 93/73 (BP Location: Right arm)  Pulse 86  Temp 97.1  F (36.2  C) (Axillary)  Resp 18  Wt 62.6 kg (138 lb 0.1 oz)  LMP  (LMP Unknown)  SpO2 97%  BMI 23.88 kg/m2     VSS on 3lpm humidified air. Opens eyes spontaneously to voice, oriented to self. Periods of confusion. Easily redirected/reoriented. Attempted to stand and pivot to commode but patient was too weak to stand, used bed pan instead. Lung sounds are coarse in upper airway. Bed alarm in use. YOSELYN x2 to abdomen, no output. Strictly NPO. TPN cyclical, starts at 2000. Denies pain/Nausea. Pleasant.

## 2017-07-01 NOTE — PLAN OF CARE
Problem: Goal Outcome Summary  Goal: Goal Outcome Summary  PT - per plan established by the Physical Therapist, according to functional mobility the  discharge recommendation is TCU for cont skilled PT to progress functional mobility. Pt needing mod A x 1-2 for all bed mob, pt demo sitting reaching in sitting to assist in IND sitting. Pt needing cont min A for sittign balance. Pt BP low in sitting  ( 80/65 ) pt return to bed no sit to stand today.

## 2017-07-01 NOTE — PLAN OF CARE
Problem: Goal Outcome Summary  Goal: Goal Outcome Summary  Outcome: No Change  She is able to say that she's in the hospital and name correct hospital. Confused a to time and situation. Interacting appropriately with visitors and responds to questions sensibly. On bepan for BMx1 (liquid, loose) and voiding adequately. VSS. Zero output from YOSELYN drains to abdomen. Dressings changed. PICC line heparin locked. Repositioned every two hours. Bed alarm is on.

## 2017-07-02 NOTE — PLAN OF CARE
Problem: Goal Outcome Summary  Goal: Goal Outcome Summary  PT - per plan established by the Physical Therapist, according to functional mobility the  discharge recommendation is TCU for cont skilled PT to progress functional mobility. Pt needing min A for supine to sit. Pt able to demo increased IDN with sitting balance but still needing CGA to close SBA. Pt able to at EOB up to 8 min. Pt demo supine and seated there x at EOB x 10. Pt needing min A x 2 for stand pivot transfer to bed side chair. Pt tend to lean backward in standing with little change after V.c for posture.

## 2017-07-02 NOTE — PLAN OF CARE
Problem: Goal Outcome Summary  Goal: Goal Outcome Summary  Pt orientation and mental status much improved over previous 12 hours. Speech clear, logical. Pt c/o abdominal discomfort that she said was r/t a full bladder. Bladder scan measured 590ml, pt had 100ml output on bedpan, and 2 subsequent incontinent incidences. Pt discomfort persisted, and when asked indicated by hand that the discomfort was around one of her drain sites. She denied needing any pain medications. Pt HR between upper 90s and 140s. 02 sats in high 90s on 3L. Will continue to monitor and alert MDs to any changes.

## 2017-07-02 NOTE — PROGRESS NOTES
Surgery Progress Note  07/02/2017     Subjective/Interval History:  Resting comfortably in bed during visit.  WBC remained stable and was AF.   +Stool  A-fib with occasional low BP. 500 cc bolus of LR given during day.    Objective:  Temp:  [97.1  F (36.2  C)-99.3  F (37.4  C)] 98.1  F (36.7  C)  Pulse:  [] 84  Resp:  [18-20] 18  BP: ()/(50-73) 87/54  SpO2:  [95 %-98 %] 95 %    General appearance: in NAD, confused  Pulm: Non-labored breathing. Saturating well on RA  CV: hemodynamically stable.   Abd: Soft, appropriately TTP, ND. Incisions c/d/i. Drains in place.   Skin: warm and well-perfused.     Drains, output minimal  UOP adequate, unmeasured voids  Stooling today    Labs reviewed  WBC 12.3  BMP ok  Lactic acid 1.0    Assessment/Plan:   89F with perforated duodenal ulcer s/p exploratory laparoscopy, primary duodenal ulcer repair, Juan J patch 6/18/17.      - NG remains out  - Perforated duodenal ulcer: CT 6/27 demonstrates continued leak. Continue non operative management. Anticipating spontaneous sealing. Continue TPN, NPO, and abx. Repeat CT Abdomen with oral contrast on Monday 7/3 (ordered).  - Afib stable, HR stable low 100s. Sched metoprolol 5mg q6h and PRN. Given 500 cc bolus of LR.   - ID - Trend WBC and procalcitonin. Ertapenem (6/23-).  - Prophylaxis: PPI, PCDs, and heparin SQ  - Pulmonary toilet, RT consult, sched nebs  - PT/OT  - Dispo: eventual TCU per therapy recs when leak sealed/contained    Seen with Dr. Wagner.        Gigi Zimmerman DO  General Surgery PGY 1   [phone]

## 2017-07-02 NOTE — PROGRESS NOTES
BP 87/54 and -140 this AM. Normotensive after LR bolus 500ml and HR  after scheduled metoprolol IV 5mg. Remains in afib. Sat up in chair this morning for 30 minutes and tolerated it fine. Up to commode x3 with assist of 2 and gait belt. Has some trouble moving feet to pivot. She is continent of bowel and bladder. Complained of abdominal pain this morning. Got tylenol suppository and some relief. YOSELYN drain with zero output. Dressings are c/d/i. Continue to encourage activity as tolerated. CT abd scheduled for tomorrow.

## 2017-07-03 NOTE — PLAN OF CARE
Problem: Goal Outcome Summary  Goal: Goal Outcome Summary  [id number]     Pt vitally stable, mentation seems to have moderately deteriorated AEB increased hallucinations and confusion. Pt up with 2 assist and gait belt to BS commode, was able to use call light to request assistance. YOSELYN drain sites C/D/I, no output. Lido patch on abdomen for drain site discomfort, abdominal binder used to position drain tubing and to provide support. Cycled TPN infusing, pt NPO. q4h BG checks, BG during shift of 124, 159, 160. Low UOP, 1 loose stool. No incontinent episodes. Abdominal CT scheduled for Monday. Will continue to monitor and alert MDs to any changes.

## 2017-07-03 NOTE — PROGRESS NOTES
CLINICAL NUTRITION SERVICES - REASSESSMENT NOTE     Nutrition Prescription    RECOMMENDATIONS FOR MDs/PROVIDERS TO ORDER:  None     Malnutrition Status:    Non-severe malnutrition in the context of acute illness    Recommendations already ordered by Registered Dietitian (RD):  Modified TPN. Decreased AA to provide 72 gm protein /day ( 1.2 gm/kg)..   Regimen provides: 1359 kcal ( 23 kcal /kg), 1.2 gm protein /kg and 26% daily lipids.     Future/Additional Recommendations:  IF pt to continue with TPN, May increase lipids x 6 days per week - Provides 1430 kcal /day ( 25 kcal /kg), 72 gm protein/day ( 1.2 gm/kg), 30% daily lipids, 210 gm dextrose/day ( GIR: 2.4 mg/kg/min). Meets needs.        EVALUATION OF THE PROGRESS TOWARD GOALS   Diet: NPO  Nutrition Support: TPN infusing via PICC  Intake: Meeting needs with TPN support     TPN regimen per 58 kg admission wt:   PN volume: 1440 mL, 210 gm dextrose, 90 gm amino acids + 250 mL 20% lipids 5 x per week.   This regimen provides: 1431 kcals, ( 25 kcal /kg), 90 gm protein, ( 1.6 gm/kg), 210 gm carbohydrate (2.4 mg/kg/min) and 28% fat daily.       NEW FINDINGS   - Perforated duodenal ulcer s/p exploratory laparoscopy, primary duodenal ulcer repair, Juan J patch 6/18/17. POD#16 s/p with post op course complicated by controlled leak.   - Perforated duodenal ulcer: CT 6/27 demonstrates continued leak  - Continue TPN, NPO, and abx.   - Labs: BUN: 49 - trending up, Cr: 0.65      MALNUTRITION  % Intake: No decreased intake noted  % Weight Loss: None noted  Subcutaneous Fat Loss: Previously: mild   Muscle Loss: Previously mild / moderate   Fluid Accumulation/Edema: None noted  Malnutrition Diagnosis: Non-severe malnutrition in the context of acute illness    Previous Goals   Total avg nutritional intake to meet a minimum of 25 kcal/kg and 1.5 gm PRO/kg daily (per dosing wt 60 kg).     Evaluation: Met with TPN    Previous Nutrition Diagnosis  Altered GI function related to GI  perforation and prolonged NPO diet order post op as evidenced by need for TPN        Evaluation: No change    CURRENT NUTRITION DIAGNOSIS  Altered GI function related to GI perforation and prolonged NPO diet order post op as evidenced by need for TPN          INTERVENTIONS  Implementation  Collaboration with other providers - pharmacist.    Goals  Total avg nutritional intake to meet a minimum of 25 kcal/kg and 1.2 gm PRO/kg daily (per dosing wt 58 kg admission wt ).    Monitoring/Evaluation  Progress toward goals will be monitored and evaluated per protocol.    Sheila Barrera RD/THELMA  Pager [phone]

## 2017-07-03 NOTE — PLAN OF CARE
Problem: Goal Outcome Summary  Goal: Goal Outcome Summary  Outcome: No Change  D/I/A: Patient A & O x 2, with [place], and pleasant confuse. Remain NPO oral care as Needed. VSS with soft BP.  PICC patent . Inconstant of urine and up to BSC with assist of two and gait belt.  YOSELYN x 2 patent . Call light in reach and bed alarm on, for safety. Continue monitor closely and update MD with change.

## 2017-07-03 NOTE — PLAN OF CARE
Problem: Goal Outcome Summary  Goal: Goal Outcome Summary  PT 5B- pt seen with rehab tech. Pt able to roll with min A but needed mod A for supine to sitting. Pt unable to stand up straight during PT session. Pt was max A of 2 for bed to commode and was not safe standing or moving on her own. Recommend using sling for transfers. Recommend TCU at this time at discharge.

## 2017-07-03 NOTE — PLAN OF CARE
Problem: Goal Outcome Summary  Goal: Goal Outcome Summary  OT/5B: Pt speaking to people not present in room throughout session, however, redirectable to tasks.Performed supine UE/LE strength/ROM exercises with use of hand weight for UE with redirection and cues throughout. .        Per plan established by the OT, the recommendation for dc location is TCU.

## 2017-07-03 NOTE — PROGRESS NOTES
Social Work Services Progress Note    Hospital Day: 16  Date of Initial Social Work Evaluation:  6/20/2017  Collaborated with:  Vinnie Eagle, [facility] TCU admissions Pricila was updated    Data:  Adia is a 89 year old female, admitted to [facility] 6/18/2017 for . She comes from home, independent, but is being recommended for TCU stay upon DC for bed mobility, transfers, ambulation, stamina, strength, and independence with functional mobility.     Intervention:  Spoke with Nephearl Eagle, he reports her first choice for TCU placement is [facility]. SW explained patient is on [facility] waitlist, if needs TPN at DC will need to go to  as many community TCU's including [facility] will not take TPN- if no TPN can likely arrange for [facility].    MANSI spoke with surgery resident today regarding disposition. Patient having a new scan on Weds to assess drain leakage, pending results will either proceed with TPN or advancing diet. TCU needed, anticipate DC Monday, next week.     Plan:    Anticipated Disposition:  Facility:  [facility]    Barriers to d/c plan:  Scan, leakage, TPN vs advancing diet needs    Follow Up:  mansi following, will contact [facility] on Weds after scan, will continue to update [facility] TCU rell AGUILERA, MSW  5B  (Medical/Surgical)  Phone: [phone]  Pager: [phone]

## 2017-07-03 NOTE — PROGRESS NOTES
Surgery Progress Note  07/03/2017     Subjective/Interval History:  No acute events overnight. Resting comfortably in bed during visit. Passing flatus and BM. Would like to eat today.    Objective:  Temp:  [96.1  F (35.6  C)-98.1  F (36.7  C)] 97  F (36.1  C)  Pulse:  [] 104  Resp:  [18] 18  BP: ()/(42-61) 106/61  SpO2:  [94 %-98 %] 96 %    General appearance: in NAD, alert  Pulm: Non-labored breathing. Saturating well on RA  CV: hemodynamically stable, intermittent a. fib  Abd: Soft, appropriately TTP, ND. Incisions c/d/i. Drains with serous output.  Skin: warm and well-perfused.     Drains, output minimal  UOP adequate, incontinent    Labs pending    Assessment/Plan:   89F with perforated duodenal ulcer s/p exploratory laparoscopy, primary duodenal ulcer repair, Juan J patch 6/18/17.      - Perforated duodenal ulcer: CT 6/27 demonstrates continued leak. Continue non operative management. Anticipating spontaneous sealing. Continue TPN, NPO, and abx.  - Afib stable, HR stable low 100s. Sched metoprolol 5mg q6h and PRN  - ID - Ertapenem (6/23-). Continue until perforation seals.  - Prophylaxis: PPI, PCDs, and heparin SQ  - Pulmonary toilet, RT consult, sched nebs  - PT/OT  - Dispo: eventual TCU per therapy recs when leak sealed/contained    Seen with Dr. Juan Wong MD  General Surgery, PGY-2  Pg [phone]

## 2017-07-04 NOTE — PLAN OF CARE
Problem: Goal Outcome Summary  Goal: Goal Outcome Summary  PT: patient continues to display increased delirium and unable to participate with OOB activities today. She required maximum assist of 1 for supine to sit and moderate assist of 1 for seated balance secondary to backward leaning. Patient unable to follow directions well and difficulty with redirection. Ended session secondary to increased anxiety and decreased participation secondary to confusion. Recommend d/c to TCU when medically stable from a PT stand point per plan of care established by physical therapist.

## 2017-07-04 NOTE — PROGRESS NOTES
Surgery Progress Note  07/04/2017     Subjective/Interval History:  A fib with RVR responsive to beta blockade. Resting comfortably in bed during visit. Passing flatus and BM. Delirium remains an issue    Objective:  Temp:  [95.8  F (35.4  C)-97.5  F (36.4  C)] 95.8  F (35.4  C)  Pulse:  [70-94] 70  Heart Rate:  [] 128  Resp:  [18-20] 20  BP: ()/(49-89) 134/89  SpO2:  [88 %-99 %] 96 %    General appearance: in NAD, alert to self, place, and year  Pulm: Non-labored breathing. Saturating well on RA  CV: hemodynamically stable, intermittent a. fib  Abd: Soft, appropriately TTP, ND. Incisions c/d/i. Drains with serous output.  Skin: warm and well-perfused.     Drains, output minimal  UOP adequate, incontinent    Labs pending    Assessment/Plan:   89F with perforated duodenal ulcer s/p exploratory laparoscopy, primary duodenal ulcer repair, Juan J patch 6/18/17.      - Perforated duodenal ulcer: CT 6/27 demonstrates continued leak. Continue non operative management. Anticipating spontaneous sealing. Continue TPN, NPO, and abx. Repeat scan with IV/po contrast Wednesday   - Afib stable, HR stable low 100s. Sched metoprolol 5mg q6h and PRN  - ID - Ertapenem (6/23-). Continue until perforation seals.  - Prophylaxis: PPI, PCDs, and heparin SQ  - Pulmonary toilet, RT consult, sched nebs, repeat cxr today   - PT/OT  - Dispo: eventual TCU per therapy recs when leak sealed/contained      Reagan Marshall MD  Surgery PGY-5  [phone]

## 2017-07-04 NOTE — PLAN OF CARE
"Problem: Goal Outcome Summary  Goal: Goal Outcome Summary  Alert and disoriented to situation, place, time. On telemetry - atrial fibrillation. Apical pulse irregular, varies from 160-90 bpm. ON 3 LPM NC with humidification. O2 sats vary from  %.  Patient denies pain. Patient having auditory and visual hallucinations, talking to people who are not there, asked writer \"can you help me carry my bags to the street car right there?\". Patient frequently attempting to get out of bed. Bed alarm on, frequent re-direction, 3 side rails up.  Skin. PICC infusing TPN and lipids.  Patient NPO. Voiding well on own, incontinent of bowel and bladder. Patient not able to ambulate, using bedpan and brief. Plan to discharge to TCU.  Call light within reach, continue with plan of care.          "

## 2017-07-04 NOTE — PLAN OF CARE
Problem: Goal Outcome Summary  Goal: Goal Outcome Summary  Outcome: No Change  Pt with increasing delirium this shift, visual and auditory hallucinations.  Pt can answer orientation questions and many questions WNL.  There is a lot of extra conversation that does not make sense.  HR increased to 150-160's, PRN Metoprolol given to lower to 130-140's.  1500 Metoprolol given early and HR currently in low 100's.  YOSELYN drains with low output, dressings CDI.  Pt incontinent urine.  Attempted to get pt to chair, did not feel this was safe with pt's mental status and inability to follow commands at this time.  Triggered SIRs protocol with HR and WBC.  Lactic WNL.  Room near nurses station, bed alarm on.  Continue to monitor.

## 2017-07-04 NOTE — PLAN OF CARE
"Problem: Goal Outcome Summary  Goal: Goal Outcome Summary  OT/5B: Patient supine in bed with HOB raised, planned for EOB sitting, but limited by elevated HR. Nursing aware and medicating, approved session for in bed gentle activity. Patient set up with g/h tasks: requires Augustine assist for brushing hair due to safety, as patient brushes at eyes/face instead. Patient unable to complete further g/h tasks due to delirium, completing \"toothbrushing and flossing\" by miming activity without actually using implements. Paitent sequences all steps to task without actually completing, but cannot be redirected to real items or activity. Session ended early. left in bed with bed alarm on.      REC: TCU      "

## 2017-07-04 NOTE — PLAN OF CARE
Problem: Goal Outcome Summary  Goal: Goal Outcome Summary  Outcome: No Change  NPO.  Disoriented X4 w/ intermittent clarity.  4L via NC over night- maintaining in the low 90s.  Repositioned q2hrs.  TPN and lipids infusing per orders.   and 218.  YOSELYN drains patent w/ dressings CDI.  Incontinent X2.  BA active.  Call light in reach and hourly rounding completed.  Continue per POC.

## 2017-07-05 NOTE — PLAN OF CARE
Problem: Goal Outcome Summary  Goal: Goal Outcome Summary  Outcome: No Change   D/I/A: Pt alert, only oriented to self, VSS. Pt restless at beginning of shift, had difficulty keeping extremities in bed. BA on for safety. Given Dilaudid 0.2 mg IV x 1 PRN. Post assessment  Calm and resting comfortable in bed .  Heavy assist of 2 to commode, pt incontinent of B/B. Remain NPO , TPN and Lipid infusing. BS , 153, and 100. Insulin given per order. YOSELYN x 2 patent and scant out put.  Call light in reach, hourly round completed. Continue monitor closely and update MD with concerns.

## 2017-07-05 NOTE — PLAN OF CARE
Problem: Goal Outcome Summary  Goal: Goal Outcome Summary  OT 5B: Pt oriented to self and reason for hospital stay. Re-oriented to date and admit date as patient stating month of June and admitted this past weekend. Pt completing rolling in bed min A x 1; min-mod A x 2 for sidelying <> sitting EOB. Pt progressing from min A to SBA, with occasional CGA for safety while seated EOB. Attempted standing with max A x 2 and FWW, but patient demonstrating extensor patten with distal LEs, limiting ability to get feet under self for safe STS, despite verbal and tactile cues. Pt lifted to chair for ADLs, independent for combing hair and min A and VCs for brushing teeth-using objects appropriately this date. Demonstrating mild hallucinations during session requiring redirection. Pt limited by activity tolerance, strength, and cognition. Rec: TCU to progress independence in ADLs.

## 2017-07-05 NOTE — PROGRESS NOTES
Surgery Progress Note  07/05/2017     Subjective/Interval History:  A fib with RVR responsive to beta blockade. Resting comfortably in bed during visit. Passing flatus and BM. Delirium improved compared to yesterday. Still reports mild needle-sticking pain in epigastric region. Will repeat CT w/ contrast today.    Objective:  Temp:  [96.5  F (35.8  C)-98.9  F (37.2  C)] 96.9  F (36.1  C)  Pulse:  [95] 95  Heart Rate:  [104-130] 104  Resp:  [18] 18  BP: (103-137)/(55-85) 116/69  SpO2:  [96 %-100 %] 97 %    General appearance: in NAD, alert to self, place, and year  Pulm: Non-labored breathing. Saturating well on RA. Spirometry on exam 500 cc  CV: hemodynamically stable, intermittent a. Fib rate controlled <120 HR   Abd: Soft, ND. Incisions c/d/i. Drains with serous output. Mild epigastric/RUQ pain on palpation without rebound/guarding   Skin: warm and well-perfused.     Drain 1: 5 cc  Drain 2: 0 cc    Labs  WBC 15.0, Hgb 10.6, Hct 33.3, Plt 595    Assessment/Plan:   89F with perforated duodenal ulcer s/p exploratory laparoscopy, primary duodenal ulcer repair, Juan J patch 6/18/17.       - Perforated duodenal ulcer: CT 6/27 demonstrates continued leak. Continue non operative management. Anticipating spontaneous sealing. Continue TPN, NPO, and abx. Repeat scan with IV/po contrast today   - Afib stable, HR stable low 100s. Sched metoprolol 5mg q6h and PRN  - ID - Ertapenem (6/23-). Continue until perforation seals.  - Prophylaxis: PPI, PCDs, and heparin SQ  - Pulmonary toilet, RT consult, sched nebs, repeat cxr today   - PT/OT  - Dispo: eventual TCU per therapy recs when leak sealed/contained      Zack Plascencia, MS4, acting as scribmaggi Kirk Ma, MD  Surgery PGY-5  [phone]

## 2017-07-05 NOTE — PLAN OF CARE
Assumed cares at 0430. Pt alert and is oriented at times, However pt made several illogical statements during shift. A2 with cares. R PICC- Cycled TPN and Lipids currently infusing. NPO.  Denies pain. Incont of B & B. Q2H turn. 3L NC- Sats mid 90's. Tachy during shift- Scheduled Metoprolol given per order. - SSI given per order. Both YOSELYN drains patent with scant amount of output. Bed alarm on for pt safety.  P: Poss repeat UGI today, cont to monitor VS, and cont with POC. Call light within reach. Will continue to monitor.

## 2017-07-05 NOTE — PLAN OF CARE
Problem: Goal Outcome Summary  Goal: Goal Outcome Summary  Outcome: No Change   D/I: Pt alert but oriented to self only, vitally stable. Sleeping in between cares. BA on for safety. Turned q 2 hours, last turned at 2 am.  Calm and resting comfortable in bed. Continues to be NPO and has TPN and Lipid infusing. BS  165, covered.  YOSELYN x 2 patent with no output so far.    P: Continue monitor closely and update MD with concerns. Possible repeat of Upper GI today per surgery note.

## 2017-07-05 NOTE — PLAN OF CARE
Problem: Goal Outcome Summary  Goal: Goal Outcome Summary  PT 5B- pt seen with assist of rehab tech. Pt able to assist with LE exercises. Pt needed mod A for supine <> sit transfers with 1-2 people. Pt worked on sitting balance and was able to sit better with support from  PT and work on trunk flexion and lateral bending. Will progress to trying EZ stand for standing. Recommend TCU at discharge.

## 2017-07-05 NOTE — PLAN OF CARE
Problem: Goal Outcome Summary  Goal: Goal Outcome Summary  Outcome: No Change  Pt more alert this morning, able to answer orientation questions.  Conversation more logical, although still appears to have hallucinations at times.  Up in chair for almost 2 hours this morning.  Has slept most of the afternoon.  C/o abd pain, IV dilaudid given x 1.  Pt nauseated while drinking contrast for CT.  IV Zofran given without relief, IV compazine then used which provided some relief.  Tele Afib with RVR, fluctuating between 110's-130's.  MD called and ordered straight cath after abd CT, drained 850cc.  Pt tolerated well.  Currently sleeping.  Will continue to monitor.

## 2017-07-06 PROBLEM — I48.91 A-FIB (H): Status: ACTIVE | Noted: 2017-01-01

## 2017-07-06 NOTE — PLAN OF CARE
Problem: Goal Outcome Summary  Goal: Goal Outcome Summary  PT: Cancel PT today as pt transferred to ICU. Will assess tomorrow if pt medically appropriate.

## 2017-07-06 NOTE — PLAN OF CARE
Problem: Goal Outcome Summary  Goal: Goal Outcome Summary  Outcome: Improving  Blood pressures stabilizing, MAPs maintained >65. Amiodarone gtt continues, titrated down to 0.5mg/min. Afib continues, rates controlled 100-120. Albumin and lasix given to reduce preload. Weaned off O2 to room air. Denies pain.     Plan: continue amiodarone gtt at 0.5mg/min until 1230 tomorrow. BP goal MAP>65.

## 2017-07-06 NOTE — PROVIDER NOTIFICATION
"Pt febrile this morning with T-101.7, hypotensive at 84/48, tachypneic with RR in the 30's.  Pt lethargic and states she felt \"very poorly.\"  STAT lactic ordered.  Surgery Resident paged and updated.  New orders placed.  Surgery team in to see pt.  Will continue to monitor, update physicians with any changes.  "

## 2017-07-06 NOTE — PROVIDER NOTIFICATION
07/06/17 1121   Call Information   Date of Call 07/06/17   Time of Call 1121   Name of person requesting the team Callie Bradford   Title of person requesting team RN   RRT Arrival time 1121   Time RRT ended 1150   Reason for call   Type of RRT Adult   Primary reason for call Cardiovascular   Cardiovascular SBP less than 90;Other (describe)  (Atrial fibrilation with RVR and hypotension.)   Was patient transferred from the ED, ICU, or PACU within last 24 hours prior to RRT call? No   SBAR   Situation Writer arrived to patient's room to assist with medication. At that time noted it was noted BP's were low and trending down. Patient mentating without difficulties, states she is pain free. LS CTA. Resps eupneic on 3L/NC. Skin warm, pink, dry. Noted to be in atrial fibrillation with RVR.    Background Here with perforated duodenal and known leak post operatively.   Notable History/Conditions Cardiac;Recent surgery   Assessment See Situation   Interventions Fluid bolus;Meds;Portable monitor;Other (describe)  (AED pads applied.)   Adjustments to Recommend ICU transfer recommended in the event presers would be need to for support. Team agreed with recomendation   Patient Outcome   Patient Outcome Transferred to  ([phone])   RRT Team   Attending/Primary/Covering Physician Deana Wong   Date Attending Physician notified 07/06/17   Time Attending Physician notified 1121   Lead RN Callie COTTER   RT Sunday     During RRT Amiodarone bolus given and drip started, fluid bolus administered, AED pads applied. Upon completing transfer to ICU, BP 85/53, MAP 63, . Continues to be alert and answer questions appropriately. Bedside report given to ICU RN.

## 2017-07-06 NOTE — PROVIDER NOTIFICATION
Holding Metoprolol d/t low BP, Tele afib with RVR in the 140-150's.  Dr. Marvin ruiz.  EKG ordered.

## 2017-07-06 NOTE — PROGRESS NOTES
Problem: Goal Outcome Summary  Goal: Goal Outcome Summary  Outcome: No Change  BP 98/45, Afib HR , on 3 L humidified air NC.  Alert, Disoriented to situation and time.  NPO and oral care as needed repositioned q2-3hr .TPN/Lipid infusing . [place]  With good volume out.  YOSELYN x 2 with no output.  Blood glucose 148 & 172.  Diaper on LBM 7/5.  Call light in reach, and bed alarm on for safety. Continue monitor closely and update MD with change.

## 2017-07-06 NOTE — PLAN OF CARE
Problem: Goal Outcome Summary  Goal: Goal Outcome Summary  OT 5B: Cancel - Per RN, pt not medically appropriate for therapy today, will reschedule for tomorrow.

## 2017-07-06 NOTE — PROGRESS NOTES
Surgery Progress Note  07/06/2017     Subjective/Interval History:  A fib with RVR responsive to beta blockade. Passing flatus and BM. Delirium improving.    Objective:  Temp:  [97  F (36.1  C)-98.5  F (36.9  C)] 98.5  F (36.9  C)  Pulse:  [] 98  Resp:  [16-18] 16  BP: ()/(45-67) 98/45  SpO2:  [92 %-97 %] 92 %    General appearance: in NAD, alert to self, place, and year  Pulm: Non-labored breathing. Saturating well on NC  CV: hemodynamically stable, intermittent a. Fib rate controlled <120 HR   Abd: Soft, ND. Incisions c/d/i. Drains with serous output. Mild epigastric/RUQ pain on palpation without rebound/guarding   Skin: warm and well-perfused.     Drain 1: 0 cc  Drain 2: 5 cc    CT reviewed: no extravasation of contrast seen, gas collection extending to skin. Overall improved compared to last week.     Assessment/Plan:   89F with perforated duodenal ulcer s/p exploratory laparoscopy, primary duodenal ulcer repair, Juan J patch 6/18/17.       - Perforated duodenal ulcer: CT 6/27 demonstrates continued leak. Continue non operative management. Anticipating spontaneous sealing. Continue TPN, NPO, and abx. Repeat scan with IV/po contrast in 1 week   - Afib stable, HR stable low 100s. Sched metoprolol 5mg q6h and PRN  - ID - Ertapenem (6/23-). Continue until perforation seals.  - Prophylaxis: PPI, PCDs, and heparin SQ  - Pulmonary toilet, RT consult, sched nebs, repeat cxr today   - PT/OT  - Dispo: eventual TCU per therapy recs when leak sealed/contained      Zack Plascencia, MS4, acting as scralyssa Kirk Ma, MD  Surgery PGY-5  [phone]

## 2017-07-06 NOTE — PLAN OF CARE
Problem: Goal Outcome Summary  Goal: Goal Outcome Summary  Outcome: No Change  D/i/A: Patient alert to self only and lethargic , arouse to voice ,Patient received Compazine during day shift due to Nausea. Patient with soft BP , given 500 cc NS over 4hr per order.   Remain NPO and oral care as needed repositioned q2-3hr .TPN/Lipid infusing . Valentino placed without problem  at 16:00 and urine returned 650 cc when placed valentino, and urine out 1200 cc on this shift. YOSELYN x 2 patent and dressing  changed to site. Call light in reach, and bed alarm on for safety. Continue monitor closely and update MD with change.

## 2017-07-06 NOTE — PROGRESS NOTES
"Social Work Services Progress Note    Hospital Day: 19  Date of Initial Social Work Evaluation:    Collaborated with:  Surgical team, Charge RN    Data:  Adia is a 89 year old female admitted to [facility] 6/18/2017 for perforated hernia, underwent exploratory laparoscopy and duodenal ulcer repair on 6/18. Admitted for post-op monitoring. Patient has a drain placed, but remains IP for ongoing leaking. She underwent CT with contrast yesterday 7/5 to evaluate if still leaking. Per surgical note, some improvement but plans to repeat the contrast CT in 1 week, i.e. Weds July 12th. If leaking is continued, patient will likley need to DC on TPN and therefore [facility]. If leaking is resolved then patient would like to be referred to [facility] for TCU.     Adia is anxious about recommendations for TCU as she has been so independent at baseline, she is afraid if she goes to TCU-\"they will make her go into the nursing home and she will never leave\". She found [facility] to be a good compromise to this fear as there is no LTC, but after ongoing thought, also would like [facility] if possible as many family members have been there and it is close to her family. This will depend on if patient needs to DC on TPN or oral diet.    Intervention:  MANSI paged surgical resident Ma @ 9 am requesting update on this patients POC. Patient received a CT with contrast yesterday showing no marked decline, some improvement, surgery note says \"repeat scan with contrast in 1 week\". -\"patient is going no where anytime soon\"    Assessment: Patient is transferring to ICU this morning for dropping pressures. MANSI will continue following but anticipating patient is not near DC.     Patient does not have code status on file, no health care directive on file. Patient names her two main contacts to be Fco and Edson (nephews) mansi has discussed disposition with both, and her friend Fallon Reese if family is not available @ " [phone]      Plan:    Anticipated Disposition:  Facility:  [facility]  TCU    Barriers to d/c plan: repeat CT with contrast, evaluate leaking, then either advancing diet or discharging on TPN    Follow Up:  SW will follow patient through hospitalization to facilitate discharge planning.    Clare AGUILERA, MSW  5B  (Medical/Surgical)  Phone: [phone]  Pager: [phone]

## 2017-07-06 NOTE — PLAN OF CARE
Float RN called to help initiate Amiodarone drip.  Pt's BP 78/47 on arrival, pt still Afib with RVR in 140s.  RRT called.  Dr. Wong notified and down to assess pt.  NS bolus infusing.  BP continuing to decrease.  Pt remained awake and conversant, aware of her situation.  Orders for transfer to .  Pt transferred with Float RN x 2, pads placed and on monitor. Dr. Wong present during transfer.  Report given to ICU RN.  Will pack and send pt belongings.  Surgery team will contact family and update about condition and transfer.

## 2017-07-06 NOTE — H&P
SURGICAL ICU H&P  July 6, 2017      CO-MORBIDITIES:   Perforated viscus    ASSESSMENT: Adia Del Valle is a 89 year old female POD # 18 s/p exploratory  Laparoscopy with peptic ulcer repair.  Patient has afib with RVR and developed hemodynamic instability this morning. Transferred to SICU for care.     PLAN:  Neuro/ pain/ sedation:  - Monitor neurological status. Notify the MD for any acute changes in exam.  - APAP PRN and Dilaudid PRN for pain.      Pulmonary care:   - Supplemental oxygen to keep saturation above 92 %.  - Incentive spirometer every 15- 30 minutes when awake.  - Scheduled albuterol   - ABG    Cardiovascular:    - Monitor hemodynamic status.   - Amiodarone drip at 1 mg/min  - Metoprolol 5 mg IV Q6H. Hold for SBP < 100 or HR<60  - BNP   - Serial troponins  - EKG  - ECHO  - CXR  - Consult cardiology    GI care:   - NPO except ice chips and medications.    Fluids/ Electrolytes/ Nutrition:   - Parenteral nutrition.    Renal/ Fluid Balance:    - Urine output is adequate so far.  - Will continue to monitor intake and output.  - Del Real  - BMP q12h    Endocrine:    - Sliding scale insulin.     ID/ Antibiotics  - ertapenem 1 g QD    Heme:     - Hemoglobin stable.   - Recheck CBC     MSK: PT/OT     Prophylaxis:    - DVT prophylaxis with heparin gtt    Lines/ tubes/ drains:  - R PICC, Abdomen suction drain x2, Del Real, PIV    Disposition:  -  Surgical ICU.     Patient seen, findings and plan discussed with surgical ICU staff , Dr Sr.    Artemio Guevara MD  PGY-2 General Surgery        - - - - - - - - - - - - - - - - - - - - - - - - - - - - - - - - - - - - - - - - - - - - - - - - - - - - - - - - - - - - - - - - - - - - - - - -     PRIMARY TEAM: SICU  PRIMARY PHYSICIAN: Heide Murphy    REASON FOR CRITICAL CARE ADMISSION: Atrial fibrillation with RVR and hemodynamic instability   ADMITTING PHYSICIAN: Dr. Sr     HISTORY PRESENTING ILLNESS: Adia Del Valle is a 89 year old female POD # 18 s/p  exploratory  Laparoscopy with peptic ulcer repair who was doing well until this morning. During hospitalization, she was found to have atrial fibrillation with RVR that was responding to well to metoprolol. This morning the patient had a temperature of 101.7, was hypotensive and tachypneic. She said she felt very poor. She had an EKG at that time which confirmed afib with RVR. They were unable to control blood pressures and rate so she was transferred to the SICU. On floor patient is doing better but does not appear to remember much of this morning and said she has felt fine all day. She also believes she was admitted on August 18th but was able to state that she is in the hospital and who she was. She states she is feeling fine and has no complaints.     REVIEW OF SYSTEMS: As noted above. Denies headache, dizziness.  Fever this AM, no chills. No chest pain or shortness of breath. No abdominal pain, nausea, vomiting. No diarrhea or constipation. No urinary difficulties. No muscle or body aches. Confusion about date but orientated to place and person.     PAST MEDICAL HISTORY:   Past Medical History:   Diagnosis Date     Cardiomegaly      Cholecystitis, unspecified      Disorder of bone and cartilage, unspecified      Generalized osteoarthrosis, unspecified site      Lump or mass in breast      Pure hypercholesterolemia      Unspecified essential hypertension      Unspecified venous (peripheral) insufficiency        SURGICAL HISTORY:   Past Surgical History:   Procedure Laterality Date     CATARACT IOL, RT/LT       CHOLECYSTECTOMY, LAPOROSCOPIC  8/07    Cholecystectomy, Laparoscopic     COLONOSCOPY       ENDOSCOPIC RELEASE CARPAL TUNNEL  4/25/2012    Procedure:ENDOSCOPIC RELEASE CARPAL TUNNEL; RIGHT ENDOSCOPIC CARPAL TUNNEL RELEASE; Surgeon:SERG PERKINS; Location:[facility]     GASTROSCOPY N/A 6/18/2017    Procedure: GASTROSCOPY;;  Surgeon: Rolly Fletcher MD;  Location: [facility] OR     LAPAROSCOPIC  CHOLECYSTECTOMY N/A 6/18/2017    Procedure: LAPAROSCOPIC CHOLECYSTECTOMY;  Laparoscopic Exploration of Abdomen, Upper Endoscopy, Closure of duodenal Ulcer, Modified Juan J Patch ;  Surgeon: Rolly Fletcher MD;  Location: [facility] OR     LAPAROTOMY EXPLORATORY N/A 6/18/2017    Procedure: LAPAROTOMY EXPLORATORY;;  Surgeon: Rolly Fletcher MD;  Location: [facility] OR     PHACOEMULSIFICATION CLEAR CORNEA WITH STANDARD INTRAOCULAR LENS IMPLANT  5/23/2013    Procedure: PHACOEMULSIFICATION CLEAR CORNEA WITH STANDARD INTRAOCULAR LENS IMPLANT;  RIGHT PHACOEMULSIFICATION CLEAR CORNEA WITH STANDARD INTRAOCULAR LENS IMPLANT ;  Surgeon: Vidal Heredia MD;  Location: [facility]     SURGICAL HISTORY OF -       left breast biopsy     SURGICAL HISTORY OF -       left hammertoe and bunionectomy     SURGICAL HISTORY OF -   2008    cataract extraction     SURGICAL HISTORY OF -   2009    left carpal tunnel release     TONSILLECTOMY         SOCIAL HISTORY: Tobacco - Never used. Etoh - Doesn't use.     FAMILY HISTORY: No bleeding/clotting disorders nor problems with anesthesia.     ALLERGIES:      Allergies   Allergen Reactions     Codeine Sulfate      Dizzy, nauseated     Tramadol Other (See Comments)     dizzy       MEDICATIONS:    No current facility-administered medications on file prior to encounter.   Current Outpatient Prescriptions on File Prior to Encounter:  hydrochlorothiazide (HYDRODIURIL) 25 MG tablet TAKE 1/2 TABLET(12.5 MG) BY MOUTH DAILY   Naproxen Sodium (ALEVE PO) Take 440 mg by mouth every morning with breakfast   polyethylene glycol (MIRALAX/GLYCOLAX) powder Take 17 g by mouth daily as needed for constipation    Multiple Vitamins-Minerals (PRESERVISION AREDS 2) CAPS Take 1 capsule by mouth daily    Tetrahydrozoline HCl (EYE DROPS OP) Apply 1 drop to eye 2 times daily as needed EYE DROPS        PHYSICAL EXAMINATION:  Temp:  [97  F (36.1  C)-101.7  F (38.7  C)] 100.8  F (38.2  C)  Pulse:  [] 98  Heart Rate:  [124-146]  146  Resp:  [16-32] 28  BP: ()/(44-67) 87/48  SpO2:  [92 %-98 %] 93 %  General: Alert, cachetic elderly woman in no acute distress.  HEENT: Normocephalic, atraumatic. Patent nares.   Neck: No cervical lymphadenopathy. No thyromegaly.   Chest wall: Symmetric thorax. No masses or tenderness to palpation.   Respiratory: Non-labored breathing. Lung sounds clear to auscultation bilaterally. Mild cough.  Cardiovascular: Tachycardic with an irregularly irregular rhythm.   Gastrointestinal: Abdomen soft, non-distended, non-tender to palpation. No organomegaly or masses appreciated. Two suction tubes present with minimal drainage in tubes. Bandages clean and in place around tubes.   Genitourinary: No CVA tenderness.   Extremities: Moving all four extremities. No limb deformities. No pedal edema. Distal pulses +1  Skin: As noted above. No rashes or lesions appreciated.    LABS: Reviewed.   Arterial Blood Gases   No lab results found in last 7 days.  Complete Blood Count     Recent Labs  Lab 07/06/17  0753 07/05/17  0638 07/04/17  0655 07/03/17  0719   WBC 14.5* 15.0* 14.4* 12.0*   HGB 9.6* 10.6* 11.6* 10.9*    595* 663* 622*     Basic Metabolic Panel    Recent Labs  Lab 07/06/17  0753 07/05/17  0638 07/04/17  0655 07/03/17  0719    140 141 141   POTASSIUM 4.7 4.9 4.6 4.5   CHLORIDE 109 107 109 110*   CO2 26 22 24 23   BUN 52* 67* 49* 49*   CR 0.74 1.20* 0.65 0.56   * 168* 182* 147*     Liver Function Tests    Recent Labs  Lab 07/03/17  0719   AST 53*   ALT 69*   ALKPHOS 269*   BILITOTAL 0.4   ALBUMIN 2.0*   INR 1.14     Pancreatic Enzymes  No lab results found in last 7 days.  Coagulation Profile    Recent Labs  Lab 07/03/17  0719   INR 1.14     Lactate  Invalid input(s): LACTATE    IMAGING:  Results for orders placed or performed during the hospital encounter of 06/18/17   CT Abdomen Pelvis w Contrast    Narrative    CT ABDOMEN PELVIS W CONTRAST 6/18/2017 2:18 AM    HISTORY: Left-sided abdominal  pain.    COMPARISON: None.    TECHNIQUE:  Abdomen and pelvis CT was performed following intravenous  administration of 65 cc Isovue-370.  Radiation dose for this scan was  reduced using automated exposure control, adjustment of the mA and/or  kV according to patient size, or iterative reconstruction technique.    FINDINGS: Basilar scarring. No pleural effusion.    Large amount of free intraperitoneal air. The wall of the stomach is  indistinct and the appearance is suspicious for a ruptured gastric  ulcer. There are moderately dilated loops of inflamed appearing small  bowel in the left hemiabdomen associated with some free fluid.  Remainder of the small and large bowel has normal caliber. No  loculated collection to suggest abscess.    Intrahepatic biliary dilatation. Cholecystectomy clips. Spleen,  pancreas, adrenal glands and kidneys are unremarkable.    Multiple uterine fibroids. Urinary bladder is distended with normal  wall thickness. Small amount of free fluid in the dependent pelvis.    Dense atherosclerotic vascular calcification without evidence of  aneurysm.      Impression    IMPRESSION: Large amount of free intraperitoneal air. Indistinct  gastric wall suggests the possibility of a ruptured gastric ulcer.  Moderate free fluid in the abdomen without abscess.    GONZALEZ VEGA MD   Chest  XR, 1 view portable    Narrative    XR CHEST PORT 1 VW 6/18/2017 3:28 AM    HISTORY: Preop.    COMPARISON: None.    FINDINGS: No airspace consolidation, pleural effusion or pneumothorax.  Moderate cardiomegaly.      Impression    IMPRESSION: Cardiomegaly without acute pulmonary abnormality.    GONZALEZ VEGA MD   XR Abdomen Port 1 View    Narrative    XR ABDOMEN PORT F1 VW  6/20/2017 5:44 AM      HISTORY: interval eval    COMPARISON: CT 6/18/2017.    FINDINGS: Surgical drains project over the abdomen. Right upper  quadrant surgical clips. Enteric tube tip projects over the body the  stomach. Nonobstructive bowel gas  pattern. No acute osseous  abnormalities. Degenerative changes of the lower lumbar spine.  Vascular calcifications and tortuous abdominal aorta. Presumed  multiple calcifications in uterine fibroids?      Impression    IMPRESSION:   1. Enteric tube tip projects over the body of the stomach.  2. Nonobstructive bowel gas pattern.  3. Multiple calcified uterine fibroids.    I have personally reviewed the examination and initial interpretation  and I agree with the findings.    KRISH CLIFFORD MD   XR Chest Port 1 View    Narrative    XR CHEST PORT 1 VW  6/20/2017 9:24 AM      HISTORY: RN placed PICC - verify tip placement    COMPARISON: 6/18/2017    FINDINGS: Single portable AP view of the chest supine. New right upper  extremity PICC tip projects over the low SVC. Enteric tube proceeds  below the level of the diaphragm and the inferior margin of the  field-of-view. Cardiac silhouette is stably enlarged. Pulmonary  vascular markings are prominent. Small left pleural effusion.  Bilateral retrocardiac and perihilar opacities concerning for  developing edema or infection. The visualized upper abdomen, osseous  structures, and soft tissues are unremarkable.      Impression    IMPRESSION:   1. Right upper extremity PICC tip over the low SVC. Otherwise stable  support devices.  2. Pulmonary vascular congestion and perihilar/bilateral retrocardiac  opacities concerning for edema or infection.   3. Small left pleural effusion, new from prior study.    I have personally reviewed the examination and initial interpretation  and I agree with the findings.    TAMMY NAVARRO MD   CT Abdomen Pelvis w Contrast    Narrative    Exam: CT abdomen pelvis with contrast 6/24/2017 1:02 PM.    History: Elevated white blood cell count. Patient with recent  operative repair of perforated duodenal ulcer..    Comparison: CT dated 6/18/2017.    Technique: CT images from the lung bases through the symphysis pubis  after the uneventful  administration of IV and oral contrast.    Findings:   Surgical changes of laparoscopic abdominal surgery and Juan J patch  placement for perforated duodenal ulcer. Percutaneous drainage  catheters lie anterior to the liver and within the left upper  quadrant. Decreased, but persistent, free air along the anterior  abdomen (series 2 image 36) and anterior to the descending duodenum  (series 2 image 36). Free fluid tracks along the liver, decreased when  compared with presurgical CT. No new fluid collection/abscess  appreciated.    Mucosal thickening of the gastric antrum and duodenum, likely  secondary to inflammatory process and recent surgical changes.  Additional hazy infiltration of the fat about the colon is favored to  be postsurgical. No dilated loops of bowel. No pneumatosis or portal  venous gas. No significant free fluid in the pelvis. No  intra-abdominal, retroperitoneal or inguinal lymphadenopathy by size  criteria.    Surgical changes of cholecystectomy with mild intrahepatic biliary  dilatation.. The liver, spleen, pancreas and adrenal glands are  unremarkable. Symmetric nephrographic enhancement of the kidneys.  Subcentimeter renal hypodensity superior pole the left kidney is too  small to definitely characterize but may represent simple renal cysts.  Urinary bladder is partially distended with air in the bladder,  presumably secondary to recent instrumentation. Multiple calcified  fibroids of the uterus. Major vessels of the abdomen are patent.  Atherosclerotic ossifications of the abdominal aorta without  aneurysmal dilatation.    Bones and soft tissues: Multilevel degenerative changes of the spine.  No acute osseous abnormalities or suspicious osseous lesions.      Impression    Impression:   1. Surgical changes of laparoscopic abdominal surgery and patch  placement for perforated duodenal ulcer. Percutaneous drainage  catheters lie anterior to the liver and within the left upper  quadrant. Overall  pneumoperitoneum and intra-abdominal free fluid has  decreased compared with 6/18/2017. No new drainable collections,  however, leak cannot be entirely excluded. Consider upper GI exam if  clinically indicated.  Some persistent areas of ascites and  pneumoperitoneum area present surrounding the duodenum and liver.   2. Slight irregular contour to the gastric antrum and gastric wall  thickening, with small locules of gas along the anterior gastric wall,  which is likely postprocedural and secondary to prior ulceration.   Component of persistent leak can not completely be excluded, although  findings may represent interval resolution of perforated gas.  Continued attention on follow-up recommended.  Overall exam improved.   3. Katarzyna mesentery in the central and right abdomen is presumably  secondary to intraabdominal free fluid and recent surgery.  4. Fibroid appearing uterus is stable, with stable or mildly increased  prominence of the endometrium. Attention on follow-up imaging is  recommended.   5. Gas in urinary bladder, presumably from instrumentation/catheter.    I have personally reviewed the examination and initial interpretation  and I agree with the findings.    TAMMY NAVARRO MD   XR Abdomen Port 1 View    Narrative    CR abdomen 6/25/2017 3:20 AM    History: NG tube placement    Comparison: 6/20/2017, 6/12/2017    Findings: Single portable AP view of the abdomen. Gastric tube tip and  sidehole project over the left upper quadrant. The sidehole projects  over the GE junction. Bilateral surgical drains are in place.  Cholecystectomy clips. Nonobstructive bowel gas pattern.  Calcifications in the pelvis are stable. Moderate degenerative changes  of the lumbar spine. Left pleural effusion.      Impression    Impression:   1. Gastric tube tip projects over the left upper quadrant, the  sidehole projects over the GE junction.  2. Surgical drains in the abdomen. Nonobstructive bowel gas pattern.  3. Left  pleural effusion.    I have personally reviewed the examination and initial interpretation  and I agree with the findings.    ALLYSON TEIXEIRA MD   CT Abdomen Pelvis w/o Contrast     Value    Radiologist flags Duodenal leak (Urgent)    Narrative    EXAMINATION: CT ABDOMEN PELVIS W/O CONTRAST, 6/27/2017 12:51 PM    TECHNIQUE:  Helical CT images from the lung bases through the  symphysis pubis were obtained with oral contrast.  Coronal and  sagittal reformatted images were generated at a workstation for  further assessment.    CONTRAST:  50 ml Isovue 370, oral.    COMPARISON: CT 6/24/2017, 6/18/2017    HISTORY: Perforated duodenal ulcer, status post exploratory laparotomy  with primary duodenal ulcer repair and Juan J patch.    FINDINGS:    Bowel: A gastric tube terminates in the stomach. Oral contrast is seen  within the stomach and small bowel. There is a tract of air extending  along the anterior duodenal bulb, expanding along the inferior surface  of the liver into the anterior abdomen (series 3, images 21 - 10 and  series 4, images 74 - 81). There is extraluminal contrast seen within  this tract.  Fluid Collections: There is been expansion of the phlegmon, containing  pockets of air and extraluminal contrast, located at the hepatic  flexure (series 5, images 195 through 226). The largest measurable  diameter of this is measures 5.9 x 7.8 cm (series 5, image 226); the  hepatic flexure is just posterior to this region. Stable appearance of  the fluid collection along the anterior tip of the liver, which is  associated with this region. An anterior approach abdominal drain  terminates anterior to segment 3 of the liver, not within the fluid  collection. Right lateral approach abdominal drain terminates in the  left mid abdomen, not within a fluid collection.  Lymph nodes: Reactive mesenteric lymphadenopathy.    Other findings:   Lung bases: Small pleural effusions with associated obstructive  atelectasis. Trace  pericardial effusion.  Liver: No suspicious liver lesions.  Gallbladder: Surgically absent.  Spleen: Normal size.  Pancreas: No suspicious pancreatic lesions. The pancreatic duct is not  dilated. A few calcifications are present in the body of the pancreas,  which may be parenchymal or vascular in origin.  Adrenal glands: No adrenal nodules. Nodular thickening of the adrenal  glands.  Kidneys: No hydronephrosis or obstructing renal stones. Multiple  wedge-shaped cortical defects, likely prior insults.   Bladder / Pelvic organs: Del Real catheter present within the urinary  bladder; there is air within the urinary bladder. Myomatous uterus  with multiple calcifications.  Vessels: No infrarenal aortic aneurysm. Anemia. Tortuous abdominal  aorta.    Bones and soft tissues: Degenerative changes of the spine, most  prominent at levels L3-L4 and L5-S1. Lumbarized S1.       Impression    IMPRESSION:   1. Evidence of reperforation of the anterior duodenal bulb wall, with  extraluminal oral contrast and air extending along the anterior  abdominal wall. There is a new phlegmon adjacent to the hepatic  flexure, which is continuous with the fluid collection at the anterior  tip of the liver (which was present on prior exams). Two abdominal  drains are present, neither of which terminate in a fluid collection.  2. Small pleural effusions with associated atelectasis.  3. Del Real catheter and a small amount of gas within the urinary  bladder, likely related, but underlying urinary tract infection cannot  be excluded.     [Urgent Result: Duodenal leak]    Finding was identified on 6/27/2017 2:21 PM.     Dr. Reagan Marshall was contacted by Dr. Pacheco Henderson at 6/27/2017 2:22 PM  and verbalized understanding of the urgent finding.     I have personally reviewed the examination and initial interpretation  and I agree with the findings.    MARTHA MESA   XR Chest 2 Views    Narrative    XR CHEST 2 VW  6/27/2017 12:48 PM      HISTORY: interval  eval    COMPARISON: 6/20/2017    FINDINGS: AP and lateral views of the chest upright. Right upper  extremity PICC tip projects over the mid to high SVC. Enteric tube  proceeds below the level of the diaphragm and the inferior margin of  the field-of-view. The cardiomediastinal silhouette and pulmonary  vasculature are stable and within normal limits. Left pleural effusion  and adjacent compressive atelectasis. The visualized upper abdomen,  osseous structures, and soft tissues are unremarkable.      Impression    IMPRESSION:   1. Stable positioning of support devices as above.  2. Interval improvement of pulmonary vascular congestion and edema.  3. Stable small left pleural effusion and adjacent compressive  atelectasis.    I have personally reviewed the examination and initial interpretation  and I agree with the findings.    ALLYSON TEIXEIRA MD   XR Chest Port 1 View    Narrative    Single view chest    Comparisons: 6/27/2017    HISTORY: Evaluate for pneumonia.    FINDINGS: Right-sided PICC line remains in place. Lung volumes are  low. The patient is rotated to the right. Persistent blunting of the  left costophrenic angle. Mild stranding opacities at both lung bases,  similar to the prior.      Impression    IMPRESSION: No significant change compared to 6/27/2017. Small left  pleural effusion and probable mild bibasilar atelectasis.    LOPEZ SUH MD   CT Abdomen Pelvis w Contrast    Narrative    EXAMINATION: CT ABDOMEN PELVIS W CONTRAST, 7/5/2017 2:14 PM    TECHNIQUE:  Helical CT images from the lung bases through the  symphysis pubis were obtained  with IV contrast. Contrast dose:  iopamidol (ISOVUE-370) solution 85 mL    COMPARISON: CT 6/27/2017    HISTORY: With PO contrast please. Eval for leak from duodenal perf s/p  duodenal perforation    FINDINGS:  Nasogastric tube removed. Oral contrast is seen within the stomach,  and small bowel to the terminal ilium. The discrete tract of air  extending from  the duodenal bulb to the anterior abdominal wall is no  longer visualized, however there is persistent free abdominal air that  infiltrates the surgical breech. Air extends through the ventral  abdominal fascia in the midline about the umbilicus. There are  predominantly air with some fluid containing multiloculated  collections surrounding the duodenal bulb and second portion of the  duodenum as seen on series 5 image 156 and series 5 image 127. One of  the air collections in the right retroperitoneal  region measuring 2.6  x 3.0 cm contains dependent hyperdensity, suggestive of contained  contrast extravasation, series 5 image 132. There is now an organized  peripherally enhancing 1.6 x 1.8 x 3.5 cm fluid collection along the  inferior right hepatic lobe. Foci of air continuing to track  inferiorly to the phlegmonous change along the hepatic flexure which  has enlarged and contains more air than seen previously.  Unchanged position of the existing drains that terminate anterior to  segment 3 and in the left mid abdomen, none of them within a fluid  collection.  Unchanged reactive mesenteric lymphadenopathy. Persistent wall  thickening along the gastric antrum and hepatic flexure with  associated inflammatory stranding, also likely reactive.    Lung bases: Reduced bilateral basilar pleural effusion and  atelectasia, especially on the right side.  Liver: Slightly dilated intrahepatic bile ducts. Common bile duct  measures 10 mm. The gallbladder is surgically absent.  Spleen: Normal size.  Pancreas: No suspicious pancreatic lesions. No pancreatic duct  dilation  Adrenal glands:  Nodular thickening of the adrenal glands, unchanged.  Kidneys: Subcentimeter left renal hypodensities stable. New mild  bilateral hydronephrosis and hydroureter with a markedly distended  bladder.  Bladder / Pelvic organs: Hyperdistended urinary bladder with minimal  residual air.   Persistent Myomatous uterus with multiple  calcifications.  Vessels: No significant changes  Bones and soft tissues: No significant changes      Impression    IMPRESSION:   1.  Predominantly air with some fluid containing multiloculated  collections surrounding the duodenal bulb and second portion of the  duodenum. One of the air collections in the right retroperitoneal  region measuring 3.0 cm contains dependent hyperdensity, suggestive of  contained oral contrast extravasation.  2.  Persistent free air with air now tracking throughout the midline  ventral abdominal wall and inferiorly along the right hepatic lobe  with increased air associated with phlegmonous change along the  hepatic flexure. There is now an organized peripherally enhancing  fluid collection along the inferior right hepatic lobe.  3.  Mild intrahepatic and extrahepatic biliary ductal dilatation.  4.  New mild bilateral hydronephrosis and hydroureter with markedly  distended bladder.  5.  Slightly improved right pleural effusion with persistent left  pleural effusion and bibasilar atelectasis.    I have personally reviewed the examination and initial interpretation  and I agree with the findings.    JOSÉ MIGUEL DUENAS MD   XR Chest Port 1 View    Narrative    Exam: XR CHEST PORT 1 VW, 7/6/2017 11:16 AM    Indication: Evaluate for PNA    Comparison: 7/4/2017.    Findings:   Single AP view the chest. Right-sided PICC with tip at the mid SVC.  Patient is rotated to the right. There is stable rightward deviation  of the trachea mediastinum. Low lung volumes. Increased perihilar and  bibasilar interstitial lung markings. Increase in right midlung patchy  opacity. Small bilateral pleural effusions. No pneumothorax.      Impression    Impression:   1. Increasing right midlung opacity. Concern for infection or  atelectasis versus asymmetric edema.  2. Increased interstitial lung markings likely representing  interstitial edema.  3. Small bilateral pleural effusions, left greater than right.

## 2017-07-07 NOTE — BRIEF OP NOTE
[facility] Brief Operative Note    Pre-operative diagnosis: Duodenal fistula   Post-operative diagnosis Duodenal fistula   Procedure: Procedure(s):  Upper Endoscopy with Nasogastric tube Placement - Wound Class: I-Clean   Surgeon: Jonathan Escalante MD PhD   Assistants(s): Renaldo Bills MD   Estimated blood loss: Minimal    Specimens: None   Findings: Pediatric gastroscope advanced through patient's left nare. Ulcerated area seen in the duodenal bulb consistent with patient's known history of duodenal perforation s/p repair. Nasojejunal tube placed with the over a wire with the aid of fluoroscopy. Tube placement confirmed fluoroscopically.

## 2017-07-07 NOTE — PROGRESS NOTES
SURGICAL ICU H&P  July 6, 2017      CO-MORBIDITIES:   Perforated viscus    ASSESSMENT: Adia Del Valle is a 89 year old female POD # 18 s/p exploratory  Laparoscopy with peptic ulcer repair.  Patient has afib with RVR and developed hemodynamic instability this morning. Transferred to SICU for monitoring and care.     Events/Changes 7/7:  - amio drip cancelled, started on PO amio  - ertapenam dc  - heparin q8h sq  - transfer to floor    PLAN:  Neuro/ pain/ sedation:  - Monitor neurological status. Notify the MD for any acute changes in exam.  - APAP PRN and Dilaudid PRN for pain.      Pulmonary care:   - Supplemental oxygen to keep saturation above 92 %.  - Incentive spirometer every 15- 30 minutes when awake.  - Scheduled albuterol     Cardiovascular:    - Monitor hemodynamic status.   - Amiodarone PO  - DC metoprolol    GI care:   - NPO except ice chips and medications.    Fluids/ Electrolytes/ Nutrition:   - TPN    Renal/ Fluid Balance:    - Will continue to monitor intake and output.  - Del Real    Endocrine:    - Sliding scale insulin.     ID/ Antibiotics  - DC Ertapenem    Heme:     - Hemoglobin stable.   - Recheck CBC     MSK: PT/OT     Prophylaxis:    - DVT prophylaxis with heparin gtt    Lines/ tubes/ drains:  - R PICC, Abdomen suction drain x2, Del Real, PIV    Disposition:  -  Transfer to floor.    Patient seen, findings and plan discussed with surgical ICU staff , Dr Sr.    Artemio Guevara MD  PGY-2 General Surgery    SUBJECTIVE:     Patient did well overnight and has no complaints at this time. Pressures have stayed low with return of NSR starting 0100. Nurse reports low urine output this morning.     OBJECTIVE:  Vital signs:  Temp: 98.1  F (36.7  C) Temp src: Oral BP: 109/55   Heart Rate: 76 Resp: 22 SpO2: 95 % O2 Device: Nasal cannula Oxygen Delivery: 1 LPM   Weight: 61.1 kg (134 lb 11.2 oz)  Estimated body mass index is 23.3 kg/(m^2) as calculated from the following:    Height as of 2/3/17: 1.619  "m (5' 3.75\").    Weight as of this encounter: 61.1 kg (134 lb 11.2 oz).    PHYSICAL EXAMINATION:  General: Alert, cachetic elderly woman in no acute distress.  HEENT: Normocephalic, atraumatic. Patent nares.   Neck: No cervical lymphadenopathy. No thyromegaly.   Chest wall: Symmetric thorax. No masses or tenderness to palpation.   Respiratory: Non-labored breathing. Lung sounds clear to auscultation bilaterally. Mild cough.  Cardiovascular: NSR now. No murmurs, rubs, gallops.   Gastrointestinal: Abdomen soft, non-distended, non-tender to palpation. No organomegaly or masses appreciated. Two suction tubes present with minimal drainage in tubes. Bandages clean and in place around tubes. Drainage bag placed over abscess drain site.  Genitourinary: No CVA tenderness.   Extremities: Moving all four extremities. No limb deformities. No pedal edema. Distal pulses +1  Skin: As noted above. No rashes or lesions appreciated.    LABS: Reviewed.   Arterial Blood Gases   No lab results found in last 7 days.  Complete Blood Count     Recent Labs  Lab 07/07/17 0323 07/06/17 1211 07/06/17  0753 07/05/17  0638   WBC 11.1* 13.2* 14.5* 15.0*   HGB 8.7* 9.2* 9.6* 10.6*    398 432 595*     Basic Metabolic Panel    Recent Labs  Lab 07/07/17 0323 07/06/17 1951 07/06/17 1211 07/06/17  0753 07/05/17  0638     --  141 140 140   POTASSIUM 4.2 3.7 4.4 4.7 4.9   CHLORIDE 106  --  111* 109 107   CO2 25  --  24 26 22   BUN 42*  --  49* 52* 67*   CR 0.68  --  0.73 0.74 1.20*   *  --  146* 160* 168*     Liver Function Tests    Recent Labs  Lab 07/06/17  1211 07/03/17  0719   AST 30 53*   ALT 45 69*   ALKPHOS 214* 269*   BILITOTAL 0.3 0.4   ALBUMIN 1.6* 2.0*   INR  --  1.14     Pancreatic Enzymes  No lab results found in last 7 days.  Coagulation Profile    Recent Labs  Lab 07/03/17  0719   INR 1.14     Lactate  Invalid input(s): LACTATE    IMAGING:  Results for orders placed or performed during the hospital encounter of " 06/18/17   CT Abdomen Pelvis w Contrast    Narrative    CT ABDOMEN PELVIS W CONTRAST 6/18/2017 2:18 AM    HISTORY: Left-sided abdominal pain.    COMPARISON: None.    TECHNIQUE:  Abdomen and pelvis CT was performed following intravenous  administration of 65 cc Isovue-370.  Radiation dose for this scan was  reduced using automated exposure control, adjustment of the mA and/or  kV according to patient size, or iterative reconstruction technique.    FINDINGS: Basilar scarring. No pleural effusion.    Large amount of free intraperitoneal air. The wall of the stomach is  indistinct and the appearance is suspicious for a ruptured gastric  ulcer. There are moderately dilated loops of inflamed appearing small  bowel in the left hemiabdomen associated with some free fluid.  Remainder of the small and large bowel has normal caliber. No  loculated collection to suggest abscess.    Intrahepatic biliary dilatation. Cholecystectomy clips. Spleen,  pancreas, adrenal glands and kidneys are unremarkable.    Multiple uterine fibroids. Urinary bladder is distended with normal  wall thickness. Small amount of free fluid in the dependent pelvis.    Dense atherosclerotic vascular calcification without evidence of  aneurysm.      Impression    IMPRESSION: Large amount of free intraperitoneal air. Indistinct  gastric wall suggests the possibility of a ruptured gastric ulcer.  Moderate free fluid in the abdomen without abscess.    GONZALEZ VEGA MD   Chest  XR, 1 view portable    Narrative    XR CHEST PORT 1 VW 6/18/2017 3:28 AM    HISTORY: Preop.    COMPARISON: None.    FINDINGS: No airspace consolidation, pleural effusion or pneumothorax.  Moderate cardiomegaly.      Impression    IMPRESSION: Cardiomegaly without acute pulmonary abnormality.    GONZALEZ VEGA MD   XR Abdomen Port 1 View    Narrative    XR ABDOMEN PORT F1 VW  6/20/2017 5:44 AM      HISTORY: interval eval    COMPARISON: CT 6/18/2017.    FINDINGS: Surgical drains project over  the abdomen. Right upper  quadrant surgical clips. Enteric tube tip projects over the body the  stomach. Nonobstructive bowel gas pattern. No acute osseous  abnormalities. Degenerative changes of the lower lumbar spine.  Vascular calcifications and tortuous abdominal aorta. Presumed  multiple calcifications in uterine fibroids?      Impression    IMPRESSION:   1. Enteric tube tip projects over the body of the stomach.  2. Nonobstructive bowel gas pattern.  3. Multiple calcified uterine fibroids.    I have personally reviewed the examination and initial interpretation  and I agree with the findings.    KRISH CLIFFORD MD   XR Chest Port 1 View    Narrative    XR CHEST PORT 1 VW  6/20/2017 9:24 AM      HISTORY: RN placed PICC - verify tip placement    COMPARISON: 6/18/2017    FINDINGS: Single portable AP view of the chest supine. New right upper  extremity PICC tip projects over the low SVC. Enteric tube proceeds  below the level of the diaphragm and the inferior margin of the  field-of-view. Cardiac silhouette is stably enlarged. Pulmonary  vascular markings are prominent. Small left pleural effusion.  Bilateral retrocardiac and perihilar opacities concerning for  developing edema or infection. The visualized upper abdomen, osseous  structures, and soft tissues are unremarkable.      Impression    IMPRESSION:   1. Right upper extremity PICC tip over the low SVC. Otherwise stable  support devices.  2. Pulmonary vascular congestion and perihilar/bilateral retrocardiac  opacities concerning for edema or infection.   3. Small left pleural effusion, new from prior study.    I have personally reviewed the examination and initial interpretation  and I agree with the findings.    TAMMY NAVARRO MD   CT Abdomen Pelvis w Contrast    Narrative    Exam: CT abdomen pelvis with contrast 6/24/2017 1:02 PM.    History: Elevated white blood cell count. Patient with recent  operative repair of perforated duodenal  ulcer..    Comparison: CT dated 6/18/2017.    Technique: CT images from the lung bases through the symphysis pubis  after the uneventful administration of IV and oral contrast.    Findings:   Surgical changes of laparoscopic abdominal surgery and Juan J patch  placement for perforated duodenal ulcer. Percutaneous drainage  catheters lie anterior to the liver and within the left upper  quadrant. Decreased, but persistent, free air along the anterior  abdomen (series 2 image 36) and anterior to the descending duodenum  (series 2 image 36). Free fluid tracks along the liver, decreased when  compared with presurgical CT. No new fluid collection/abscess  appreciated.    Mucosal thickening of the gastric antrum and duodenum, likely  secondary to inflammatory process and recent surgical changes.  Additional hazy infiltration of the fat about the colon is favored to  be postsurgical. No dilated loops of bowel. No pneumatosis or portal  venous gas. No significant free fluid in the pelvis. No  intra-abdominal, retroperitoneal or inguinal lymphadenopathy by size  criteria.    Surgical changes of cholecystectomy with mild intrahepatic biliary  dilatation.. The liver, spleen, pancreas and adrenal glands are  unremarkable. Symmetric nephrographic enhancement of the kidneys.  Subcentimeter renal hypodensity superior pole the left kidney is too  small to definitely characterize but may represent simple renal cysts.  Urinary bladder is partially distended with air in the bladder,  presumably secondary to recent instrumentation. Multiple calcified  fibroids of the uterus. Major vessels of the abdomen are patent.  Atherosclerotic ossifications of the abdominal aorta without  aneurysmal dilatation.    Bones and soft tissues: Multilevel degenerative changes of the spine.  No acute osseous abnormalities or suspicious osseous lesions.      Impression    Impression:   1. Surgical changes of laparoscopic abdominal surgery and  patch  placement for perforated duodenal ulcer. Percutaneous drainage  catheters lie anterior to the liver and within the left upper  quadrant. Overall pneumoperitoneum and intra-abdominal free fluid has  decreased compared with 6/18/2017. No new drainable collections,  however, leak cannot be entirely excluded. Consider upper GI exam if  clinically indicated.  Some persistent areas of ascites and  pneumoperitoneum area present surrounding the duodenum and liver.   2. Slight irregular contour to the gastric antrum and gastric wall  thickening, with small locules of gas along the anterior gastric wall,  which is likely postprocedural and secondary to prior ulceration.   Component of persistent leak can not completely be excluded, although  findings may represent interval resolution of perforated gas.  Continued attention on follow-up recommended.  Overall exam improved.   3. Katarzyna mesentery in the central and right abdomen is presumably  secondary to intraabdominal free fluid and recent surgery.  4. Fibroid appearing uterus is stable, with stable or mildly increased  prominence of the endometrium. Attention on follow-up imaging is  recommended.   5. Gas in urinary bladder, presumably from instrumentation/catheter.    I have personally reviewed the examination and initial interpretation  and I agree with the findings.    TAMMY NAVARRO MD   XR Abdomen Port 1 View    Narrative    CR abdomen 6/25/2017 3:20 AM    History: NG tube placement    Comparison: 6/20/2017, 6/12/2017    Findings: Single portable AP view of the abdomen. Gastric tube tip and  sidehole project over the left upper quadrant. The sidehole projects  over the GE junction. Bilateral surgical drains are in place.  Cholecystectomy clips. Nonobstructive bowel gas pattern.  Calcifications in the pelvis are stable. Moderate degenerative changes  of the lumbar spine. Left pleural effusion.      Impression    Impression:   1. Gastric tube tip projects over  the left upper quadrant, the  sidehole projects over the GE junction.  2. Surgical drains in the abdomen. Nonobstructive bowel gas pattern.  3. Left pleural effusion.    I have personally reviewed the examination and initial interpretation  and I agree with the findings.    ALLYSON TEIXEIRA MD   CT Abdomen Pelvis w/o Contrast     Value    Radiologist flags Duodenal leak (Urgent)    Narrative    EXAMINATION: CT ABDOMEN PELVIS W/O CONTRAST, 6/27/2017 12:51 PM    TECHNIQUE:  Helical CT images from the lung bases through the  symphysis pubis were obtained with oral contrast.  Coronal and  sagittal reformatted images were generated at a workstation for  further assessment.    CONTRAST:  50 ml Isovue 370, oral.    COMPARISON: CT 6/24/2017, 6/18/2017    HISTORY: Perforated duodenal ulcer, status post exploratory laparotomy  with primary duodenal ulcer repair and Juan J patch.    FINDINGS:    Bowel: A gastric tube terminates in the stomach. Oral contrast is seen  within the stomach and small bowel. There is a tract of air extending  along the anterior duodenal bulb, expanding along the inferior surface  of the liver into the anterior abdomen (series 3, images 21 - 10 and  series 4, images 74 - 81). There is extraluminal contrast seen within  this tract.  Fluid Collections: There is been expansion of the phlegmon, containing  pockets of air and extraluminal contrast, located at the hepatic  flexure (series 5, images 195 through 226). The largest measurable  diameter of this is measures 5.9 x 7.8 cm (series 5, image 226); the  hepatic flexure is just posterior to this region. Stable appearance of  the fluid collection along the anterior tip of the liver, which is  associated with this region. An anterior approach abdominal drain  terminates anterior to segment 3 of the liver, not within the fluid  collection. Right lateral approach abdominal drain terminates in the  left mid abdomen, not within a fluid collection.  Lymph nodes:  Reactive mesenteric lymphadenopathy.    Other findings:   Lung bases: Small pleural effusions with associated obstructive  atelectasis. Trace pericardial effusion.  Liver: No suspicious liver lesions.  Gallbladder: Surgically absent.  Spleen: Normal size.  Pancreas: No suspicious pancreatic lesions. The pancreatic duct is not  dilated. A few calcifications are present in the body of the pancreas,  which may be parenchymal or vascular in origin.  Adrenal glands: No adrenal nodules. Nodular thickening of the adrenal  glands.  Kidneys: No hydronephrosis or obstructing renal stones. Multiple  wedge-shaped cortical defects, likely prior insults.   Bladder / Pelvic organs: Del Real catheter present within the urinary  bladder; there is air within the urinary bladder. Myomatous uterus  with multiple calcifications.  Vessels: No infrarenal aortic aneurysm. Anemia. Tortuous abdominal  aorta.    Bones and soft tissues: Degenerative changes of the spine, most  prominent at levels L3-L4 and L5-S1. Lumbarized S1.       Impression    IMPRESSION:   1. Evidence of reperforation of the anterior duodenal bulb wall, with  extraluminal oral contrast and air extending along the anterior  abdominal wall. There is a new phlegmon adjacent to the hepatic  flexure, which is continuous with the fluid collection at the anterior  tip of the liver (which was present on prior exams). Two abdominal  drains are present, neither of which terminate in a fluid collection.  2. Small pleural effusions with associated atelectasis.  3. Del Real catheter and a small amount of gas within the urinary  bladder, likely related, but underlying urinary tract infection cannot  be excluded.     [Urgent Result: Duodenal leak]    Finding was identified on 6/27/2017 2:21 PM.     Dr. Reagan Marshall was contacted by Dr. Pacheco Henderson at 6/27/2017 2:22 PM  and verbalized understanding of the urgent finding.     I have personally reviewed the examination and initial  interpretation  and I agree with the findings.    MARTHA MESA   XR Chest 2 Views    Narrative    XR CHEST 2 VW  6/27/2017 12:48 PM      HISTORY: interval eval    COMPARISON: 6/20/2017    FINDINGS: AP and lateral views of the chest upright. Right upper  extremity PICC tip projects over the mid to high SVC. Enteric tube  proceeds below the level of the diaphragm and the inferior margin of  the field-of-view. The cardiomediastinal silhouette and pulmonary  vasculature are stable and within normal limits. Left pleural effusion  and adjacent compressive atelectasis. The visualized upper abdomen,  osseous structures, and soft tissues are unremarkable.      Impression    IMPRESSION:   1. Stable positioning of support devices as above.  2. Interval improvement of pulmonary vascular congestion and edema.  3. Stable small left pleural effusion and adjacent compressive  atelectasis.    I have personally reviewed the examination and initial interpretation  and I agree with the findings.    ALLYSON TEIXEIRA MD   XR Chest Port 1 View    Narrative    Single view chest    Comparisons: 6/27/2017    HISTORY: Evaluate for pneumonia.    FINDINGS: Right-sided PICC line remains in place. Lung volumes are  low. The patient is rotated to the right. Persistent blunting of the  left costophrenic angle. Mild stranding opacities at both lung bases,  similar to the prior.      Impression    IMPRESSION: No significant change compared to 6/27/2017. Small left  pleural effusion and probable mild bibasilar atelectasis.    LOPEZ SUH MD   CT Abdomen Pelvis w Contrast    Narrative    EXAMINATION: CT ABDOMEN PELVIS W CONTRAST, 7/5/2017 2:14 PM    TECHNIQUE:  Helical CT images from the lung bases through the  symphysis pubis were obtained  with IV contrast. Contrast dose:  iopamidol (ISOVUE-370) solution 85 mL    COMPARISON: CT 6/27/2017    HISTORY: With PO contrast please. Eval for leak from duodenal perf s/p  duodenal  perforation    FINDINGS:  Nasogastric tube removed. Oral contrast is seen within the stomach,  and small bowel to the terminal ilium. The discrete tract of air  extending from the duodenal bulb to the anterior abdominal wall is no  longer visualized, however there is persistent free abdominal air that  infiltrates the surgical breech. Air extends through the ventral  abdominal fascia in the midline about the umbilicus. There are  predominantly air with some fluid containing multiloculated  collections surrounding the duodenal bulb and second portion of the  duodenum as seen on series 5 image 156 and series 5 image 127. One of  the air collections in the right retroperitoneal  region measuring 2.6  x 3.0 cm contains dependent hyperdensity, suggestive of contained  contrast extravasation, series 5 image 132. There is now an organized  peripherally enhancing 1.6 x 1.8 x 3.5 cm fluid collection along the  inferior right hepatic lobe. Foci of air continuing to track  inferiorly to the phlegmonous change along the hepatic flexure which  has enlarged and contains more air than seen previously.  Unchanged position of the existing drains that terminate anterior to  segment 3 and in the left mid abdomen, none of them within a fluid  collection.  Unchanged reactive mesenteric lymphadenopathy. Persistent wall  thickening along the gastric antrum and hepatic flexure with  associated inflammatory stranding, also likely reactive.    Lung bases: Reduced bilateral basilar pleural effusion and  atelectasia, especially on the right side.  Liver: Slightly dilated intrahepatic bile ducts. Common bile duct  measures 10 mm. The gallbladder is surgically absent.  Spleen: Normal size.  Pancreas: No suspicious pancreatic lesions. No pancreatic duct  dilation  Adrenal glands:  Nodular thickening of the adrenal glands, unchanged.  Kidneys: Subcentimeter left renal hypodensities stable. New mild  bilateral hydronephrosis and hydroureter with a  markedly distended  bladder.  Bladder / Pelvic organs: Hyperdistended urinary bladder with minimal  residual air.   Persistent Myomatous uterus with multiple calcifications.  Vessels: No significant changes  Bones and soft tissues: No significant changes      Impression    IMPRESSION:   1.  Predominantly air with some fluid containing multiloculated  collections surrounding the duodenal bulb and second portion of the  duodenum. One of the air collections in the right retroperitoneal  region measuring 3.0 cm contains dependent hyperdensity, suggestive of  contained oral contrast extravasation.  2.  Persistent free air with air now tracking throughout the midline  ventral abdominal wall and inferiorly along the right hepatic lobe  with increased air associated with phlegmonous change along the  hepatic flexure. There is now an organized peripherally enhancing  fluid collection along the inferior right hepatic lobe.  3.  Mild intrahepatic and extrahepatic biliary ductal dilatation.  4.  New mild bilateral hydronephrosis and hydroureter with markedly  distended bladder.  5.  Slightly improved right pleural effusion with persistent left  pleural effusion and bibasilar atelectasis.    I have personally reviewed the examination and initial interpretation  and I agree with the findings.    JOSÉ MIGUEL DUENAS MD   XR Chest Port 1 View    Narrative    Exam: XR CHEST PORT 1 VW, 7/6/2017 11:16 AM    Indication: Evaluate for PNA    Comparison: 7/4/2017.    Findings:   Single AP view the chest. Right-sided PICC with tip at the mid SVC.  Patient is rotated to the right. There is stable rightward deviation  of the trachea mediastinum. Low lung volumes. Increased perihilar and  bibasilar interstitial lung markings. Increase in right midlung patchy  opacity. Small bilateral pleural effusions. No pneumothorax.      Impression    Impression:   1. Increasing right midlung opacity. Concern for infection or  atelectasis versus asymmetric  edema.  2. Increased interstitial lung markings likely representing  interstitial edema.  3. Small bilateral pleural effusions, left greater than right.

## 2017-07-07 NOTE — ANESTHESIA POSTPROCEDURE EVALUATION
Patient: Adia Del Valle    Procedure(s):  Upper Endoscopy with Nasogastric tube Placement - Wound Class: I-Clean    Diagnosis:Malnutrition  Diagnosis Additional Information: No value filed.    Anesthesia Type:  No value filed.    Note:  Anesthesia Post Evaluation    Patient location during evaluation: PACU  Patient participation: Able to fully participate in evaluation  Level of consciousness: awake and alert  Pain management: adequate  Airway patency: patent  Cardiovascular status: acceptable and hemodynamically stable  Respiratory status: acceptable  Hydration status: acceptable  PONV: none     Anesthetic complications: None          Last vitals:  Vitals:    07/07/17 1100 07/07/17 1200 07/07/17 1342   BP: 96/57 105/48 107/65   Pulse:      Resp:  18 18   Temp:  36.8  C (98.2  F) 36.6  C (97.9  F)   SpO2: 93% 93%          Electronically Signed By: Gigi More MD  July 7, 2017  6:54 PM

## 2017-07-07 NOTE — CONSULTS
GASTROENTEROLOGY CONSULTATION      Date of Admission:  6/18/2017          ASSESSMENT AND RECOMMENDATIONS:   Assessment:  89 year old female with a history of OA on chronic NSAIDs admitted 6/18 with perforated duodenal ulcer s/p Juan J patch repair with concern for enterocutaneous fistula development.  GI is consulted for consideration of endoluminal evaluation of surgical site/perforation along with endoscopic NJ placement to deliver nutrition.     Recommendations  -Plan for endoscopic placement of NJ feeding tube today in OR  -Will also allow for endoscopic assessment of previously perforated ulcer site, if fistula has developed this may require endoscopic therapy, exact timetable yet to be determined  -Continue TPN nutrition in the interim, NPO    Gastroenterology follow up recommendations: Pending procedure    Thank you for involving us in this patient's care. Please do not hesitate to contact the GI service with any questions or concerns.     Pt care plan discussed with Dr. Beaver, GI staff physician.    Aubrey Dillard  -------------------------------------------------------------------------------------------------------------------          Chief Complaint:   We were asked by Dr. Sr of surgery to evaluate this patient with perforated duodenal ulcer s/p Juan J patch with concern for ongoing leak.    History is obtained from the patient and the medical record.          History of Present Illness:   Adia Del Valle is a 89 year old female with a history of HTN, HLP, cholecystitis s/p cholecystectomy in 2007, and OA on chronic NSAIDs initially admitted 6/18/17 with abdominal pain ultimately found to have a perforated duodenal ulcer.  She was taken to the OR for exploratory laparotomy where the perforation was identified and treated via Juan J patch.  Hospital course has since been complicated by sluggish closure/resolution of perforation.  Repeat CT scan 6/27 demonstrated ongoing leak.  CT scan  7/5 without active leak but with free air tracking throughout the midline ventral wall and along the R hepatic lobe.  Initial plan was to repeat CT scan next week to evaluate assuming ongoing clinical stability.    On 7/6, the patient spiked a fever to 101.2 (on carbapenem) and developed a-fib with RVR with associated hypotension.  She was transferred to the ICU for closer monitoring and initiated on an amiodarone infusion.  As of 7/7 AM rates better controlled and BP has improved.    The patient's main complaints are of feeling weak, being hungry, and not being able to hear everything because she is somewhat hard of hearing.  She denies abdominal pain and has a soft abdomen.               Past Medical History:   Reviewed and edited as appropriate  Past Medical History:   Diagnosis Date     Cardiomegaly      Cholecystitis, unspecified      Disorder of bone and cartilage, unspecified      Generalized osteoarthrosis, unspecified site      Lump or mass in breast      Pure hypercholesterolemia      Unspecified essential hypertension      Unspecified venous (peripheral) insufficiency             Past Surgical History:   Reviewed and edited as appropriate   Past Surgical History:   Procedure Laterality Date     CATARACT IOL, RT/LT       CHOLECYSTECTOMY, LAPOROSCOPIC  8/07    Cholecystectomy, Laparoscopic     COLONOSCOPY       ENDOSCOPIC RELEASE CARPAL TUNNEL  4/25/2012    Procedure:ENDOSCOPIC RELEASE CARPAL TUNNEL; RIGHT ENDOSCOPIC CARPAL TUNNEL RELEASE; Surgeon:SERG PERKINS; Location:[facility]     GASTROSCOPY N/A 6/18/2017    Procedure: GASTROSCOPY;;  Surgeon: Rolly Fletcher MD;  Location: [facility] OR     LAPAROSCOPIC CHOLECYSTECTOMY N/A 6/18/2017    Procedure: LAPAROSCOPIC CHOLECYSTECTOMY;  Laparoscopic Exploration of Abdomen, Upper Endoscopy, Closure of duodenal Ulcer, Modified Juan J Patch ;  Surgeon: Rolly Fletcher MD;  Location: [facility] OR     LAPAROTOMY EXPLORATORY N/A 6/18/2017    Procedure: LAPAROTOMY  EXPLORATORY;;  Surgeon: Rolly Fletcher MD;  Location: [facility] OR     PHACOEMULSIFICATION CLEAR CORNEA WITH STANDARD INTRAOCULAR LENS IMPLANT  5/23/2013    Procedure: PHACOEMULSIFICATION CLEAR CORNEA WITH STANDARD INTRAOCULAR LENS IMPLANT;  RIGHT PHACOEMULSIFICATION CLEAR CORNEA WITH STANDARD INTRAOCULAR LENS IMPLANT ;  Surgeon: Vidal Heredia MD;  Location: [facility]     SURGICAL HISTORY OF -       left breast biopsy     SURGICAL HISTORY OF -       left hammertoe and bunionectomy     SURGICAL HISTORY OF -   2008    cataract extraction     SURGICAL HISTORY OF -   2009    left carpal tunnel release     TONSILLECTOMY              Previous Endoscopy:   No results found for this or any previous visit.         Social History:   Reviewed and edited as appropriate  Social History     Social History     Marital status: Single     Spouse name: N/A     Number of children: N/A     Years of education: N/A     Occupational History     Not on file.     Social History Main Topics     Smoking status: Never Smoker     Smokeless tobacco: Never Used     Alcohol use No     Drug use: No     Sexual activity: No     Other Topics Concern     Parent/Sibling W/ Cabg, Mi Or Angioplasty Before 65f 55m? No     Social History Narrative            Family History:   Reviewed and edited as appropriate  No known history of gastrointestinal/liver disease or  gastrointestinal malignancies       Allergies:   Reviewed and edited as appropriate     Allergies   Allergen Reactions     Codeine Sulfate      Dizzy, nauseated     Tramadol Other (See Comments)     dizzy            Medications:     Current Facility-Administered Medications   Medication     amiodarone (CORDARONE) suspension 400 mg    Followed by     [START ON 7/11/2017] amiodarone (CORDARONE) suspension 200 mg    Followed by     [START ON 7/13/2017] amiodarone (CORDARONE) suspension 200 mg     lidocaine 1 % 10 mL     ertapenem (INVanz) 1 g vial to attach to  mL bag     parenteral nutrition  - ADULT compounded formula CYCLE     naloxone (NARCAN) injection 0.1-0.4 mg     acetylcysteine (MUCOMYST) 20 % nebulizer solution 2 mL     albuterol neb solution 2.5 mg     lipids (INTRALIPID) 20 % infusion 250 mL     OLANZapine zydis (zyPREXA) ODT tab 5 mg     metoprolol (LOPRESSOR) injection 5 mg     potassium chloride SA (K-DUR/KLOR-CON M) CR tablet 20-40 mEq     potassium chloride (KLOR-CON) Packet 20-40 mEq     potassium chloride 10 mEq in 100 mL intermittent infusion     potassium chloride 10 mEq in 100 mL intermittent infusion with 10 mg lidocaine     potassium chloride 20 mEq in 50 mL intermittent infusion     sodium phosphate 15 mmol in D5W intermittent infusion     sodium phosphate 20 mmol in D5W intermittent infusion     sodium phosphate 25 mmol in D5W intermittent infusion     HYDROmorphone (DILAUDID) injection 0.2 mg     lidocaine (LIDODERM) 5 % Patch 1 patch    And     lidocaine (LIDODERM) patch REMOVAL    And     lidocaine (LIDODERM) patch in PLACE     lidocaine 1 % 1 mL     lidocaine (LMX4) kit     sodium chloride (PF) 0.9% PF flush 10-20 mL     heparin lock flush 10 UNIT/ML injection 5-10 mL     heparin lock flush 10 UNIT/ML injection 5-10 mL     dextrose 10 % 1,000 mL infusion     lidocaine (LMX4) kit     sodium chloride (PF) 0.9% PF flush 5-50 mL     metoprolol (LOPRESSOR) injection 5 mg     heparin sodium PF injection 5,000 Units     acetaminophen (TYLENOL) Suppository 650 mg     pantoprazole (PROTONIX) 40 mg IV push injection     prochlorperazine (COMPAZINE) injection 5 mg    Or     prochlorperazine (COMPAZINE) tablet 5 mg    Or     prochlorperazine (COMPAZINE) Suppository 12.5 mg     glucose 40 % gel 15-30 g    Or     dextrose 50 % injection 25-50 mL    Or     glucagon injection 1 mg     insulin aspart (NovoLOG) inj (RAPID ACTING)     ondansetron (ZOFRAN-ODT) ODT tab 4 mg    Or     ondansetron (ZOFRAN) injection 4 mg     magnesium sulfate 2 g in NS intermittent infusion ([facility]ar[employer] or [facility] Cmpd)      magnesium sulfate 4 g in 100 mL sterile water (premade)     hydrALAZINE (APRESOLINE) injection 5 mg             Review of Systems:   A complete review of systems was performed and is negative except as noted in the HPI           Physical Exam:   BP 97/70 (BP Location: Left arm)  Pulse 98  Temp 98.4  F (36.9  C) (Axillary)  Resp 18  Wt 61.1 kg (134 lb 11.2 oz)  LMP  (LMP Unknown)  SpO2 93%  BMI 23.3 kg/m2  Wt:   Wt Readings from Last 2 Encounters:   07/07/17 61.1 kg (134 lb 11.2 oz)   06/12/17 58.1 kg (128 lb 2 oz)      Constitutional: cooperative, pleasant, not dyspneic/diaphoretic, no acute distress  Eyes: Sclera anicteric/injected  Ears/nose/mouth/throat: MMM  CV: irregularly irregular  Respiratory: Unlabored breathing  Abd: Nondistended, soft, +bs, tender around surgical incisions, no peritoneal signs  Skin: warm, perfused, no jaundice  Neuro: AAO x 3, No asterixis  Psych: Mood and affect congruent  MSK: No gross deformities         Data:   Labs and imaging below were independently reviewed and interpreted    BMP  Recent Labs  Lab 07/07/17 0323 07/06/17 1951 07/06/17 1211 07/06/17  0753 07/05/17  0638     --  141 140 140   POTASSIUM 4.2 3.7 4.4 4.7 4.9   CHLORIDE 106  --  111* 109 107   GARO 8.2*  --  7.9* 8.5 8.5   CO2 25  --  24 26 22   BUN 42*  --  49* 52* 67*   CR 0.68  --  0.73 0.74 1.20*   *  --  146* 160* 168*     CBC  Recent Labs  Lab 07/07/17 0323 07/06/17 1211 07/06/17  0753 07/05/17  0638   WBC 11.1* 13.2* 14.5* 15.0*   RBC 2.89* 3.10* 3.25* 3.56*   HGB 8.7* 9.2* 9.6* 10.6*   HCT 27.0* 29.2* 30.8* 33.3*   MCV 93 94 95 94   MCH 30.1 29.7 29.5 29.8   MCHC 32.2 31.5 31.2* 31.8   RDW 15.3* 15.3* 15.3* 15.4*    398 432 595*     INR  Recent Labs  Lab 07/03/17  0719   INR 1.14     LFTs  Recent Labs  Lab 07/06/17  1211 07/03/17  0719   ALKPHOS 214* 269*   AST 30 53*   ALT 45 69*   BILITOTAL 0.3 0.4   PROTTOTAL 5.1* 6.1*   ALBUMIN 1.6* 2.0*      PANCNo lab results found in  last 7 days.    Imaging:  CT A/P 6/27/17  IMPRESSION:   1. Evidence of reperforation of the anterior duodenal bulb wall, with  extraluminal oral contrast and air extending along the anterior  abdominal wall. There is a new phlegmon adjacent to the hepatic  flexure, which is continuous with the fluid collection at the anterior  tip of the liver (which was present on prior exams). Two abdominal  drains are present, neither of which terminate in a fluid collection.  2. Small pleural effusions with associated atelectasis.  3. Del Real catheter and a small amount of gas within the urinary  bladder, likely related, but underlying urinary tract infection cannot  be excluded.     CT A/P 7/5/17  IMPRESSION:   1.  Predominantly air with some fluid containing multiloculated  collections surrounding the duodenal bulb and second portion of the  duodenum. One of the air collections in the right retroperitoneal  region measuring 3.0 cm contains dependent hyperdensity, suggestive of  contained oral contrast extravasation.  2.  Persistent free air with air now tracking throughout the midline  ventral abdominal wall and inferiorly along the right hepatic lobe  with increased air associated with phlegmonous change along the  hepatic flexure. There is now an organized peripherally enhancing  fluid collection along the inferior right hepatic lobe.  3.  Mild intrahepatic and extrahepatic biliary ductal dilatation.  4.  New mild bilateral hydronephrosis and hydroureter with markedly  distended bladder.  5.  Slightly improved right pleural effusion with persistent left  pleural effusion and bibasilar atelectasis.  Seen and examined with GI fellow, agree with findings and recommendations.    Michael Beaver MD GI Staff

## 2017-07-07 NOTE — ANESTHESIA PREPROCEDURE EVALUATION
"  Anesthesia Evaluation     .             ROS/MED HX    ENT/Pulmonary:       Neurologic:       Cardiovascular:     (+) hypertension----. : . . . :. .       METS/Exercise Tolerance:     Hematologic:         Musculoskeletal:         GI/Hepatic:         Renal/Genitourinary:         Endo:         Psychiatric:         Infectious Disease:         Malignancy:         Other:                      Allergies   Allergen Reactions     Codeine Sulfate      Dizzy, nauseated     Tramadol Other (See Comments)     dizzy     Prescription Medications as of 7/7/2017             ASPIRIN PO Take 81 mg by mouth daily    ACETAMINOPHEN PO Take 500 mg by mouth nightly as needed for pain    Multiple Vitamins-Minerals (MULTIVITAMIN ADULT PO) Take 1 tablet by mouth every morning    senna-docusate (SENOKOT-S;PERICOLACE) 8.6-50 MG per tablet Take 1 tablet by mouth 2 times daily as needed for constipation    hydrochlorothiazide (HYDRODIURIL) 25 MG tablet TAKE 1/2 TABLET(12.5 MG) BY MOUTH DAILY    Naproxen Sodium (ALEVE PO) Take 440 mg by mouth every morning with breakfast    polyethylene glycol (MIRALAX/GLYCOLAX) powder Take 17 g by mouth daily as needed for constipation     Multiple Vitamins-Minerals (PRESERVISION AREDS 2) CAPS Take 1 capsule by mouth daily     Tetrahydrozoline HCl (EYE DROPS OP) Apply 1 drop to eye 2 times daily as needed EYE DROPS       Facility Administered Medications as of 7/7/2017             amiodarone (CORDARONE) suspension 400 mg (Auto Hold) ((Auto Hold) since 7/7/2017  4:39 PM) 20 mLs (400 mg) by Oral or FT or NG tube route 2 times daily    Linked Group 1:  \"Followed by\" Linked Group Details     amiodarone (CORDARONE) suspension 200 mg Starting on 7/11/2017. 10 mLs (200 mg) by Oral or FT or NG tube route 2 times daily    Linked Group 1:  \"Followed by\" Linked Group Details     amiodarone (CORDARONE) suspension 200 mg Starting on 7/13/2017. 10 mLs (200 mg) by Oral or FT or NG tube route daily    Linked Group 1:  \"Followed " "by\" Linked Group Details     furosemide (LASIX) injection 20 mg Inject 2 mLs (20 mg) into the vein once    heparin sodium PF injection 5,000 Units (Auto Hold) ((Auto Hold) since 7/7/2017  4:39 PM) Inject 0.5 mLs (5,000 Units) Subcutaneous every 8 hours    parenteral nutrition - ADULT compounded formula CYCLE (Auto Hold) ((Auto Hold) since 7/7/2017  4:39 PM) by CENTRAL LINE IV route CYCLE *use admin instructions if needed    simethicone 133mg/2mL oral suspension as needed    lidocaine 1 % 1 mL 1 mL by Other route every hour as needed (mild pain with VAD insertion or accessing implanted port.)    lidocaine (LMX4) kit Apply topically every hour as needed for mild pain (with VAD insertion or accessing implanted port,)    sodium chloride (PF) 0.9% PF flush 3 mL Inject 3 mLs into the vein every hour as needed for line flush or post meds or blood draw    sodium chloride (PF) 0.9% PF flush 3 mL Inject 3 mLs into the vein every 8 hours    May continue current IV fluids if patient has IV fluids infusing. continuous prn    May take regular AM medications except those listed below continuous prn    lactated ringers infusion Inject into the vein continuous prn    fentaNYL Citrate (PF) (SUBLIMAZE) injection Inject into the vein as needed for moderate to severe pain    lidocaine injection 2% (MDV) Inject into the vein as needed    propofol (DIPRIVAN) injection 10 mg/mL vial Inject into the vein continuous prn    No abx ordered pre-op as needed    lidocaine 1 % 10 mL (Auto Hold) ((Auto Hold) since 7/7/2017  4:39 PM) 10 mLs by Other route once as needed (local anesthesia)    parenteral nutrition - ADULT compounded formula CYCLE (Auto Hold) ((Auto Hold) since 7/7/2017  4:39 PM) by CENTRAL LINE IV route CYCLE *use admin instructions if needed    naloxone (NARCAN) injection 0.1-0.4 mg (Auto Hold) ((Auto Hold) since 7/7/2017  4:39 PM) Inject 0.25-1 mLs (0.1-0.4 mg) into the vein every 2 minutes as needed for opioid reversal    albumin " human 25 % injection 25 g Inject 100 mLs (25 g) into the vein once    ertapenem (INVanz) 1 g vial to attach to  mL bag (Discontinued) Inject 1 g into the vein every 24 hours    amiodarone (NEXTERONE) 250 mg in D5W 250 mL infusion (Discontinued) Inject 0.5 mg/min into the vein continuous    acetylcysteine (MUCOMYST) 20 % nebulizer solution 2 mL (Auto Hold) ((Auto Hold) since 7/7/2017  4:39 PM) Take 2 mLs by nebulization every morning    albuterol neb solution 2.5 mg (Auto Hold) ((Auto Hold) since 7/7/2017  4:39 PM) Take 3 mLs (2.5 mg) by nebulization every morning    parenteral nutrition - ADULT compounded formula CYCLE by CENTRAL LINE IV route CYCLE *use admin instructions if needed    lipids (INTRALIPID) 20 % infusion 250 mL (Auto Hold) ((Auto Hold) since 7/7/2017  4:39 PM) Inject 250 mLs into the vein Once per day on Mon Tue Wed Thu Fri Sat    OLANZapine zydis (zyPREXA) ODT tab 5 mg (Auto Hold) ((Auto Hold) since 7/7/2017  4:39 PM) Take 1 tablet (5 mg) by mouth At Bedtime    metoprolol (LOPRESSOR) injection 5 mg (Discontinued) Inject 5 mLs (5 mg) into the vein every 6 hours    potassium chloride SA (K-DUR/KLOR-CON M) CR tablet 20-40 mEq (Auto Hold) ((Auto Hold) since 7/7/2017  4:39 PM) Take 2-4 tablets (20-40 mEq) by mouth every 2 hours as needed for potassium supplementation    potassium chloride (KLOR-CON) Packet 20-40 mEq (Auto Hold) ((Auto Hold) since 7/7/2017  4:39 PM) 20-40 mEq by Oral or Feeding Tube route every 2 hours as needed for potassium supplementation    potassium chloride 10 mEq in 100 mL intermittent infusion (Auto Hold) ((Auto Hold) since 7/7/2017  4:39 PM) Inject 100 mLs (10 mEq) into the vein every hour as needed for potassium supplementation    potassium chloride 10 mEq in 100 mL intermittent infusion with 10 mg lidocaine (Auto Hold) ((Auto Hold) since 7/7/2017  4:39 PM) Inject 100 mLs (10 mEq) into the vein every hour as needed for potassium supplementation    potassium chloride 20 mEq  "in 50 mL intermittent infusion (Auto Hold) ((Auto Hold) since 7/7/2017  4:39 PM) Inject 50 mLs (20 mEq) into the vein every hour as needed for potassium supplementation    sodium phosphate 15 mmol in D5W intermittent infusion (Auto Hold) ((Auto Hold) since 7/7/2017  4:39 PM) Inject 15 mmol into the vein daily as needed for phosphorous supplementation    sodium phosphate 20 mmol in D5W intermittent infusion (Auto Hold) ((Auto Hold) since 7/7/2017  4:39 PM) Inject 20 mmol into the vein every 6 hours as needed for phosphorous supplementation    sodium phosphate 25 mmol in D5W intermittent infusion (Auto Hold) ((Auto Hold) since 7/7/2017  4:39 PM) Inject 25 mmol into the vein every 8 hours as needed for phosphorous supplementation    HYDROmorphone (DILAUDID) injection 0.2 mg (Auto Hold) ((Auto Hold) since 7/7/2017  4:39 PM) Inject 0.2 mLs (0.2 mg) into the vein every 4 hours as needed for moderate to severe pain    lidocaine (LIDODERM) 5 % Patch 1 patch (Auto Hold) ((Auto Hold) since 7/7/2017  4:39 PM) Place 1 patch onto the skin every 24 hours at 8 PM    Linked Group 2:  \"And\" Linked Group Details     lidocaine (LIDODERM) patch REMOVAL (Auto Hold) ((Auto Hold) since 7/7/2017  4:39 PM) Place onto the skin every 24 hours at 8 AM    Linked Group 2:  \"And\" Linked Group Details     lidocaine (LIDODERM) patch in PLACE (Auto Hold) ((Auto Hold) since 7/7/2017  4:39 PM) Place onto the skin every 8 hours    Linked Group 2:  \"And\" Linked Group Details     lidocaine 1 % 1 mL (Auto Hold) ((Auto Hold) since 7/7/2017  4:39 PM) 1 mL by Other route every hour as needed (mild pain with VAD insertion or accessing implanted port)    lidocaine (LMX4) kit (Auto Hold) ((Auto Hold) since 7/7/2017  4:39 PM) Apply topically every hour as needed for moderate pain (with VAD insertion or accessing implanted port)    sodium chloride (PF) 0.9% PF flush 10-20 mL (Auto Hold) ((Auto Hold) since 7/7/2017  4:39 PM) 10-20 mLs by Intracatheter route every " "hour as needed for line flush or post meds or blood draw    heparin lock flush 10 UNIT/ML injection 5-10 mL (Auto Hold) ((Auto Hold) since 7/7/2017  4:39 PM) 5-10 mLs by Intracatheter route every 24 hours    heparin lock flush 10 UNIT/ML injection 5-10 mL (Auto Hold) ((Auto Hold) since 7/7/2017  4:39 PM) 5-10 mLs by Intracatheter route every hour as needed for other (to lock each CVC - Open Ended (Tunneled and Non-Tunneled) dormant lumen.)    dextrose 10 % 1,000 mL infusion Inject into the vein continuous prn ( )    lidocaine (LMX4) kit (Auto Hold) ((Auto Hold) since 7/7/2017  4:39 PM) Apply topically once as needed for moderate pain (for local anesthetic during PICC insertion)    sodium chloride (PF) 0.9% PF flush 5-50 mL (Auto Hold) ((Auto Hold) since 7/7/2017  4:39 PM) 5-50 mLs by Intracatheter route once as needed for line flush (to flush each lumen with line placement)    metoprolol (LOPRESSOR) injection 5 mg (Auto Hold) ((Auto Hold) since 7/7/2017  4:39 PM) Inject 5 mLs (5 mg) into the vein every 5 minutes as needed for high blood pressure (HR>120)    acetaminophen (TYLENOL) Suppository 650 mg (Auto Hold) ((Auto Hold) since 7/7/2017  4:39 PM) Place 1 suppository (650 mg) rectally every 8 hours as needed for mild pain    pantoprazole (PROTONIX) 40 mg IV push injection (Auto Hold) ((Auto Hold) since 7/7/2017  4:39 PM) Inject 40 mg into the vein daily    prochlorperazine (COMPAZINE) injection 5 mg (Auto Hold) ((Auto Hold) since 7/7/2017  4:39 PM) Inject 1 mL (5 mg) into the vein every 6 hours as needed for nausea or vomiting    Linked Group 3:  \"Or\" Linked Group Details     prochlorperazine (COMPAZINE) tablet 5 mg (Auto Hold) ((Auto Hold) since 7/7/2017  4:39 PM) Take 1 tablet (5 mg) by mouth every 6 hours as needed for vomiting    Linked Group 3:  \"Or\" Linked Group Details     prochlorperazine (COMPAZINE) Suppository 12.5 mg (Auto Hold) ((Auto Hold) since 7/7/2017  4:39 PM) Place 0.5 suppositories (12.5 mg) " "rectally every 12 hours as needed for nausea or vomiting    Linked Group 3:  \"Or\" Linked Group Details     glucose 40 % gel 15-30 g (Auto Hold) ((Auto Hold) since 7/7/2017  4:39 PM) Take 15-30 g by mouth every 15 minutes as needed for low blood sugar    Linked Group 4:  \"Or\" Linked Group Details     dextrose 50 % injection 25-50 mL (Auto Hold) ((Auto Hold) since 7/7/2017  4:39 PM) Inject 25-50 mLs into the vein every 15 minutes as needed for low blood sugar    Linked Group 4:  \"Or\" Linked Group Details     glucagon injection 1 mg (Auto Hold) ((Auto Hold) since 7/7/2017  4:39 PM) Inject 1 mg Subcutaneous every 15 minutes as needed for low blood sugar (May repeat x 1 only)    Linked Group 4:  \"Or\" Linked Group Details     insulin aspart (NovoLOG) inj (RAPID ACTING) (Auto Hold) ((Auto Hold) since 7/7/2017  4:39 PM) Inject 1-6 Units Subcutaneous every 4 hours    ondansetron (ZOFRAN-ODT) ODT tab 4 mg (Auto Hold) ((Auto Hold) since 7/7/2017  4:39 PM) Take 1 tablet (4 mg) by mouth every 6 hours as needed for nausea    Linked Group 5:  \"Or\" Linked Group Details     ondansetron (ZOFRAN) injection 4 mg (Auto Hold) ((Auto Hold) since 7/7/2017  4:39 PM) Inject 2 mLs (4 mg) into the vein every 6 hours as needed for nausea or vomiting    Linked Group 5:  \"Or\" Linked Group Details     magnesium sulfate 2 g in NS intermittent infusion (PharMEDium or FV Cmpd) (Auto Hold) ((Auto Hold) since 7/7/2017  4:39 PM) Inject 50 mLs (2 g) into the vein daily as needed for magnesium supplementation    magnesium sulfate 4 g in 100 mL sterile water (premade) (Auto Hold) ((Auto Hold) since 7/7/2017  4:39 PM) Inject 100 mLs (4 g) into the vein every 4 hours as needed for magnesium supplementation    hydrALAZINE (APRESOLINE) injection 5 mg (Auto Hold) ((Auto Hold) since 7/7/2017  4:39 PM) Inject 0.25 mLs (5 mg) into the vein every 4 hours as needed (SBP>160; hold if HR>90)    heparin sodium PF injection 5,000 Units (Discontinued) Inject 0.5 mLs " (5,000 Units) Subcutaneous every 12 hours      No results found for this or any previous visit (from the past 4320 hour(s)).  PAST MEDICAL HISTORY:   Past Medical History:   Diagnosis Date     Cardiomegaly      Cholecystitis, unspecified      Disorder of bone and cartilage, unspecified      Generalized osteoarthrosis, unspecified site      Lump or mass in breast      Pure hypercholesterolemia      Unspecified essential hypertension      Unspecified venous (peripheral) insufficiency        PAST SURGICAL HISTORY:   Past Surgical History:   Procedure Laterality Date     CATARACT IOL, RT/LT       CHOLECYSTECTOMY, LAPOROSCOPIC  8/07    Cholecystectomy, Laparoscopic     COLONOSCOPY       ENDOSCOPIC RELEASE CARPAL TUNNEL  4/25/2012    Procedure:ENDOSCOPIC RELEASE CARPAL TUNNEL; RIGHT ENDOSCOPIC CARPAL TUNNEL RELEASE; Surgeon:SERG PERKINS; Location:[facility]     GASTROSCOPY N/A 6/18/2017    Procedure: GASTROSCOPY;;  Surgeon: Rolly Fletcher MD;  Location: [facility] OR     LAPAROSCOPIC CHOLECYSTECTOMY N/A 6/18/2017    Procedure: LAPAROSCOPIC CHOLECYSTECTOMY;  Laparoscopic Exploration of Abdomen, Upper Endoscopy, Closure of duodenal Ulcer, Modified Juan J Patch ;  Surgeon: Rolly Fletcher MD;  Location: [facility] OR     LAPAROTOMY EXPLORATORY N/A 6/18/2017    Procedure: LAPAROTOMY EXPLORATORY;;  Surgeon: Rolly Fletcher MD;  Location: [facility] OR     PHACOEMULSIFICATION CLEAR CORNEA WITH STANDARD INTRAOCULAR LENS IMPLANT  5/23/2013    Procedure: PHACOEMULSIFICATION CLEAR CORNEA WITH STANDARD INTRAOCULAR LENS IMPLANT;  RIGHT PHACOEMULSIFICATION CLEAR CORNEA WITH STANDARD INTRAOCULAR LENS IMPLANT ;  Surgeon: Vidal Heredia MD;  [facility]     SURGICAL HISTORY OF -       left breast biopsy     SURGICAL HISTORY OF -       left hammertoe and bunionectomy     SURGICAL HISTORY OF -   2008    cataract extraction     SURGICAL HISTORY OF -   2009    left carpal tunnel release     TONSILLECTOMY         FAMILY HISTORY:    Family History   Problem Relation Age of Onset     Arthritis Mother      Respiratory Father      emphysema-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 No family hx of      DIABETES No family hx of      Hypertension No family hx of      CEREBROVASCULAR DISEASE No family hx of      Breast Cancer No family hx of      Cancer - colorectal No family hx of      Prostate Cancer No family hx of        SOCIAL HISTORY:   Social History   Substance Use Topics     Smoking status: Never Smoker     Smokeless tobacco: Never Used     Alcohol use No     Lab Results   Component Value Date    WBC 11.1 (H) 07/07/2017    HGB 8.7 (L) 07/07/2017    HCT 27.0 (L) 07/07/2017     07/07/2017    CHOL 301 (H) 06/12/2017    TRIG 193 (H) 06/12/2017    HDL 57 06/12/2017    ALT 45 07/06/2017    AST 30 07/06/2017     07/07/2017    BUN 42 (H) 07/07/2017    CO2 25 07/07/2017    TSH 1.95 01/19/2015    INR 1.14 07/03/2017       (Not in a hospital admission)                         Anesthesia Plan      History & Physical Review      ASA Status:  3 .        Plan for General and MAC with Intravenous induction. Maintenance will be Balanced.  Reason for MAC:  Deep or markedly invasive procedure (G8)    Additional equipment: 2nd IV      Postoperative Care      Consents  Anesthetic plan, risks, benefits and alternatives discussed with:  Patient..                          .

## 2017-07-07 NOTE — ANESTHESIA CARE TRANSFER NOTE
Patient: Adia Del Valle    Procedure(s):  Upper Endoscopy with Nasogastric tube Placement - Wound Class: I-Clean    Diagnosis: Malnutrition  Diagnosis Additional Information: No value filed.    Anesthesia Type:   No value filed.     Note:  Airway :Nasal Cannula  Patient transferred to:Medical/Surgical Unit  Comments: VSS, Report to RN,  123/64  80  98%      Vitals: (Last set prior to Anesthesia Care Transfer)    CRNA VITALS  7/7/2017 1753 - 7/7/2017 1833      7/7/2017             Pulse: 83    SpO2: 97 %    Resp Rate (set): 10                Electronically Signed By: JIMMY Jean CRNA  July 7, 2017  6:33 PM

## 2017-07-07 NOTE — PLAN OF CARE
Problem: Goal Outcome Summary  Goal: Goal Outcome Summary  Outcome: Improving  D: 89 y.o F transferred to ICU yesterday r/t symptomatic a fib c RVR. Amiodarone gtt started.   I/A: afebrile. HR: 's while in A fib, 70-90's while in SR. 0100 pt converted to SR. MAP >65 (MD aware MAPs are periodically 60). MAPs have been 60-70, no change with SR. Albumin x1 for preload reduction, unable to give lasix r/t low BP. 1 Lpm NC overnight for dipping sats with sleep. x1 BM overnight. UO: decreasing overnight 30-50 mL/hr. Lytes: Mg replaced x1, K+ replaced x1.   P: Continue amio gtt till 1230. Monitor hemodynamics.

## 2017-07-07 NOTE — PROGRESS NOTES
Social Work Services Progress Note  Hospital Day: 19  Date of Initial Social Work Evaluation:  6/20/17; please reference for details.  Collaborated with:  Team rounds; chart reviewed; patient.    Data:    Patient transferred down to ICU for Afib with RVR.  Noted plans for endoscopic placement of NJ feeding tube in OR today.    Intervention:    Met with patient this morning. Intent of visit was to confirm health care directive/decision maker, as patient has voiced to other staff she would like her nephews - Fco and Edson - to be her proxies. Initiated discussion with patient and she indicates she does have a complete health care directive. She believe this should be on file here at the hospital, but informed her this was not the case. She states there should also be a copy on file at her PCP clinic - [facility] and her [place].     SW spoke with [facility] ([phone]) and unfortunately they do not have a health care directive on file for patient. Will need to re-address.     Assessment:    Significant relationship at present time:  Brother, Nilo; nephews, Fco and Edson.  Family of origin is available for support:  Yes; involved and supportive.  Other support available:  Extended family; friends in her [employer]  Gaps in support system:  Lives alone; has been very independent prior to hospitalization.   Patient is caregiver to:  NA    Plan:  Anticipated Disposition:  Facility:  [facility] vs  TCU.  Barriers to d/c plan:  Medical stability; tube feeding placement.  Follow Up:  Will continue to follow for ongoing support and d/c planning.     PHILLIP Nicholson, [facility]  ICU   Pager: [phone]  Phone: [phone]

## 2017-07-07 NOTE — PROGRESS NOTES
Surgery Progress Note  07/07/2017     Subjective/Interval History:  A fib with RVR responsive to amiodarone. Passing flatus and BM. Endorses epigastric pain this am. Opened supra umbilical incision yesterday.     Objective:  Temp:  [97.4  F (36.3  C)-101.2  F (38.4  C)] 98.1  F (36.7  C)  Heart Rate:  [] 76  Resp:  [16-32] 22  BP: ()/(44-67) 109/55  SpO2:  [87 %-99 %] 95 %    General appearance: in NAD, alert to self, place, and year  Pulm: Non-labored breathing. Saturating well on NC  CV: hemodynamically stable, intermittent a. Fib rate controlled   Abd: Soft, ND. Incisions c/d/i. Drains with serous output. Mild epigastric/RUQ pain on palpation without rebound/guarding. Supra umbilical incision open to drainage- green succus.   Skin: warm and well-perfused.     Drain 1: 0 cc  Drain 2: 5 cc    Assessment/Plan:   89F with perforated duodenal ulcer s/p exploratory laparoscopy, primary duodenal ulcer repair, Juan J patch 6/18/17.  Post op a fib. Continued leak managed medically. 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      - Perforated duodenal ulcer: CT 6/27 demonstrates continued leak. Anticipating spontaneous sealing. Continue TPN, NPO, and abx.   - GI consulted for endoscopic evaluation/closure of duodenal perforation, NG and NJ placement.   - Afib stable, HR stable low 90s. Sched metoprolol 5mg q6h and amiodarone  - ID - Ertapenem (6/23-). Continue until perforation seals.  - Prophylaxis: PPI, PCDs, and heparin SQ  - Pulmonary toilet, RT consult, sched nebs   - PT/OT  - Dispo: eventual TCU per therapy recs when leak sealed/contained      Reagan Marshall MD  Surgery PGY-5  [phone]

## 2017-07-08 NOTE — PROGRESS NOTES
Surgery Cross-Cover Note  7/7/2017 8:51 PM     Was paged regarding use of NJ tube    S/T Dr Reagan Marshall from primary team    Plan:  NPO for overnight  Convert po amiodarone to IV    S/T pharmacist Mr Umaña  Amiodarone is short acting hence to start previous infusion    S/T cards fellow  Can be restarted on infusion     -     Edgar Moeller MD  PGY-1 General Surgery  Pager # [phone]

## 2017-07-08 NOTE — PROGRESS NOTES
Surgery Progress Note  07/08/2017     Subjective/Interval History:  NJ placed with GI yesterday. Transferred out of SICU. Passing flatus and BM. Endorses epigastric pain. Reports she is hungry. Some times of confusion during evaluation.    Objective:  Temp:  [97.4  F (36.3  C)-99  F (37.2  C)] 99  F (37.2  C)  Pulse:  [80] 80  Heart Rate:  [74-89] 88  Resp:  [16-18] 18  BP: ()/(47-79) 142/79  SpO2:  [93 %-98 %] 95 %    General appearance: in NAD, alert to self, place, and year  Pulm: Non-labored breathing. Saturating well on NC  CV: hemodynamically stable, intermittent a. Fib, rate controlled   Abd: Soft, ND. Incisions c/d/i. Drains with serous output. Mild epigastric/RUQ pain on palpation without rebound/guarding. Supra umbilical incision open to drainage- green/tan succus.   Skin: warm and well-perfused.     Drain 1: 66 cc  Drain 2: 5 cc    Assessment/Plan:   89F with perforated duodenal ulcer s/p exploratory laparoscopy, primary duodenal ulcer repair, Juan J patch 6/18/17.  Post op a fib. Continued leak managed medically. 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      - Perforated duodenal ulcer: CT 6/27 demonstrates continued leak. Anticipating spontaneous sealing. Continue TPN, NPO  - GI consulted for endoscopic evaluation/closure of duodenal perforation, NG and NJ placement. Placed NJ. Will discuss additional therapeutic interventions.  - Afib stable, HR stable low 90s. Sched metoprolol 5mg q6h and amiodarone  - ID - Ertapenem (6/23-7/8). Although perforation still evident, it appears to be draining through the tract  - Prophylaxis: PPI, PCDs, and heparin SQ  - Pulmonary toilet, RT consult, sched nebs   - PT/OT  - Dispo: eventual TCU per therapy recs when leak sealed/contained      Discussed with Dr. Pernell Wong MD  General Surgery, PGY-2  Pg [phone]

## 2017-07-08 NOTE — PLAN OF CARE
Problem: Goal Outcome Summary  Goal: Goal Outcome Summary  Outcome: No Change  Neuro: Pt is orientated to self and at times place. Pt continues to have hallucinations.   Cardiac: SR. HR 70-80s. Amiodarone switch to IV since pt is NPO. VSS.    Respiratory: Sating >90% on 1L NC.  GI/: Adequate urine output. [place] remains in place.   Diet/appetite: Pt is NPO.   Activity:  Assist of 2, repositioned Q2hrs.  Pain: At acceptable level on current regimen.   Skin: Blanchable redness to coccyx. Intact, no new deficits noted.        R: Continue with POC. Notify primary team with changes.

## 2017-07-08 NOTE — PROGRESS NOTES
CLINICAL NUTRITION SERVICES - Nutrition Note Brief:     Nutrition Prescription    RECOMMENDATIONS FOR MDs/PROVIDERS TO ORDER:  - Once Feeding tube is ready to use recommend initiating a Peptide based formula such as Peptamen 1.5 at 10 ml/hr per MD orders. Ordered this TF per second consult received from MD (RD to Assess and Order TF)  -  Recommend patency flushes with free water of 30 ml q4hrs or per MD orders pending pt's current medical condition/fluid status. Per MD fluid flushes at 10 ml q4hrs for now.   - Continue current cyclic PN    Future/Additional Recommendations:  - If and when TF rate increases adjust PN to meet pt's needs more appropriately  - Monitor tolerance to EN at trophic rate and ability to advance TF rate per MD. When okayed to advance TF rate, recommend increasing TF by 10 ml every 12 hours until goal of Peptamen 1.5 @ goal 45 ml/hr (1080 ml/day) to provide 1620 kcals (28 kcal/kg/day), 73 g PRO (1.3 g/kg/day), 832 ml free H2O, 60 g Fat (70% from MCTs), 203 g CHO and no Fiber daily.  - As pt's GI condition improves can consider transitioning pt to a more standardized intact formula such as Isosource 1.5 before pt's discharge and if plans to d/c with EN.   - Monitor diet advancement and need for ONS when diet advances     RD already following pt and pt was last re-assessed on 7/4/17 (please refer to RD notes from that date for more details. Of Note SICU RD has also been following pt via care rounds. Pt now out of ICU. Pt had NJ FT placed by GI on 7/7/17. Received MD consult for Assess and Recommend TF. Provider is responsible for entering TF orders with specific instructions to keep rate at 10 ml/hr and not to advance rate without MD approval.   Diet: NPO with cyclic PN   Nutrition Support: Cyclic PN x 12 hours - 210 gm Dextrose, 58 gm AA with 250 ml 20% lipids x 6 days a week.      INTERVENTIONS  Implementation  Enteral Nutrition - Initiate    Monitoring/Evaluation  Progress toward goals will be  monitored and evaluated per protocol. Please page or consult RD if further issues come up before then.     Fabian Pride RD LD  Weekend pager - [phone]

## 2017-07-08 NOTE — PLAN OF CARE
"Problem: Goal Outcome Summary  Goal: Goal Outcome Summary  Outcome: No Change  D/I: Pt VSS, continues on amiodarone gtt until able to tolerate meds via PO/NJ route.  Per surgery, start TF today at 10cc/hr and determine if increase in output from incisional site before start PO meds.  YOSELYN drains with minimal output, midline incision (ostomy bag drainage) with min output. Continues to need 3 L NC to maintain sats.  Given ice chips for oral comfort/dry mucus membranes, but has weak cough, says she can't quite \"clear the phlegm\".  Lungs clear, diminished.  Attempted standing at edge of bed with OT, unable to bear weight well, will need lift to get up to chair.  Continues with poor mental status, having many visual and auditory hallucinations, carrying on conversations.  Attempted to redirect/reorient patient to situation.  She is able to state where she is and how long she has been here, fairly close on today's date, knows the president, and appears alert/oriented at times.  Restless and fidgeting, has not been picking/pulling at lines.  Video monitoring started this morning, pt needing successful redirection at times.    A/P: Continue to reorient to time/place/situation, support healthy mental status.  Engaged in conversation and kept awake for most of day to encourage sleep tonight. Encourage activity as tolerated. Monitor for BP, afib issues.  Continue plan of care.      "

## 2017-07-08 NOTE — PLAN OF CARE
Problem: Goal Outcome Summary  Goal: Goal Outcome Summary  Outcome: Therapy, progress toward functional goals is gradual  OT-6B: Pt agreeable to therapy session with mild encouragement. Pt required Min A and Max vc's for transfer supine<->seated EOB using log roll technique. Therapist educated pt on abdominal precautions and safe mobility. Pt completed transfer scooting to EOB with Mod A x 2 and Max vc's. Pt required Max A x2 for partial sit<->standing ~10 seconds with knees blocked. Pt required Mod A x2 to return to supine incline in bed. Pt tolerated session well. VSS.      REC: Discharge to TCU when medically appropriate.

## 2017-07-08 NOTE — PROGRESS NOTES
Pt returned to 6B from [facility] at 1855.  Afebrile, VSS, O2 sats 94% on room air.  Pt remains NPO. Denies any pain / nausea at this time. Pt is disoriented to time and situation.   Bed alarm on .

## 2017-07-09 NOTE — PROGRESS NOTES
Surgery Progress Note  07/09/2017     Subjective/Interval History:  Adia was found obtunded and unable to arise even after sternal rubs last evening about 6pm. Her nephew was present, and after a discussion, the health care power of  requested comfort cares only. Lots of her family were present this afternoon to offer their support. She remains somnolent and is not responsive, no signs of discomfort or pain noted.  Objective:  Temp:  [98.4  F (36.9  C)-98.5  F (36.9  C)] 98.4  F (36.9  C)  Pulse:  [51-62] 62  Heart Rate:  [49-84] 56  Resp:  [12-40] 32  BP: ()/(50-88) 120/56  SpO2:  [83 %-97 %] 90 %    General appearance: Obtunded, no signs of distress or discomfort.    Assessment/Plan:   89F with perforated duodenal ulcer s/p exploratory laparoscopy, primary duodenal ulcer repair, Juan J patch 6/18/17.  Post op a fib. Continued leak managed medically. 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 Rapid response called overnight due to her being unresponsive. Family wished for comfort cares only.     - Comfort cares. Palliative care and spiritual care to see  - Ativan PRN  - Morphine PRN  - DNR/DNI  - Large amounts of family present for support    Patient discussed with chief resident, Dr. Pernell Zimmerman DO  General Surgery PGY 1   [phone]

## 2017-07-09 NOTE — PROGRESS NOTES
Pt kept comfortable, PRN Ativan given x1. Cheyne-martinez respirations. Transferred to  via bed at approximately 1715.

## 2017-07-09 NOTE — PLAN OF CARE
Problem: Goal Outcome Summary  Goal: Goal Outcome Summary  Outcome: Declining  Pt comfort cares today.  PRN medications given every 1-1.5 hours for increased pulse and respiratory rate.  Multiple family members at the bedside.  All family members have left and do not plan to return.  Fco, nephew, has advised us that they do not wish to have an autopsy and gave us the mortuary number.  Fco and wife took all personal belongings with them when they left.  Pt to be transferred to  for continued cares.  Report given.

## 2017-07-09 NOTE — PLAN OF CARE
Problem: Goal Outcome Summary  Goal: Goal Outcome Summary  Outcome: No Change  Neuro: Somnolent, not arousable. GERONIMO orientation.   Cardiac: Pt off tele, no vital signs taken this shift; pt on comfort cares.     Respiratory: Sating at mid to high 80s on 7L oxymask, .   GI/: Pt still making urine, loose stool x1.   Activity:  Assist of with Q2H turns. Pain: At acceptable level on current regimen. Pt appears comfortable even with turns. No pain medications given for comfort.     R: Continue with POC. Notify primary team with changes.

## 2017-07-09 NOTE — PROGRESS NOTES
Cross Cover Note    Called to see patient after she became unresponsive.     Patient has had a protracted hospital course beginning with a duodenal perforation s/p ex lap and repair complicated by afib, persistent leak from duodenum and formation of EC fistula. Patient has had multiple admissions to the ICU. Prior to this admission, patient had been living independently and only hospitalized one other time in her life. She was on an amiodarone gtt and would require at least one additional procedure prior to discharge.    Earlier in the day, patient had been alert and interactive, but confused. Around 6p, patient suddenly became unresponsive to floor RN. A rapid response was called and surgery intern was notified (see his separate note). A code was called but canceled because the patient had vital signs within normal limits. I arrived to evaluate the patient. She did not respond to voice or severe sternal rub. She was maintaining her oxygen saturations. She had already received one dose of 0.4 narcan before my arrival. Her abd was soft and her abdominal wounds and incisions appeared unchanged. She is currently Full Code. BG was checked before my arrival and was 115. Patient's family was present at bedside.    /56 (BP Location: Left arm)  Pulse 92  Temp 98.4  F (36.9  C) (Axillary)  Resp 12  Wt 61.8 kg (136 lb 3.9 oz)  SpO2 94%  BMI 23.57 kg/m2  Unresponsive to voice or touch.  RRR  Somewhat labored breathing but adequate O2 saturations  Abd soft, ND, ostomy bag with scant output    72y F with duodenal perforation s/p ex lap and repair complicated by afib, persistent leak from duodenum and formation of EC fistula now unresponsive but not acutely critically ill.   -CBC, BMP, Mag, Phos, Lactate, ABG, Troponin  -EKG  -CXR  -Pending discussion of code status will transfer to SICU    Discussed with Russ Gimenez and Paolo Zelaya MD  General Surgery PGY-3    ADDENDUM  Spent 25 min with patient's  family both in person and on the phone discussing the patient's current status and possible options forward. I explained that chest compressions would not be beneficial to her as she would likely not survive if she needed chest compressions and she would like have many broken ribs. It was agreed by all to make her DNR. Further discussion occurred regarding DNI. I explained three options for the family to consider. The first would be to transfer to the SICU, intubate the patient because she cannot protect her airway and then continue with full work up of unresponsiveness, including a Head CT. I explained that I would not feel comfortable sending her to the CT scanner without intubating her. With this option, I explained that if we were unable to discover and reverse what caused her to become unresponsive, she may not be able to have the breathing tube removed. Further, I explained that even if we do reverse the etiology of unresponsiveness, she may still not be able to have the breathing tube removed because of her deconditioning from her time spent in the hospital. What's more, I explained that she likely would not return to her functional status from before hospitalization due to the protracted hospital course. The second option would be to elect DNR/DNI, but continue with the work up of the unresponsiveness, including. The last option would be comfort cares and no further work up. The family asked appropriate questions and after discussion amongst the members, agreed that comfort cares would be most in line with the patient's wishes. I further comfort cares to confirm the family's wishes and they affirmed that would be what Adia would want. At that time, I d/c'ed all medication except amiodarone and ordered the comfort care order set. I spoke with the Palliative care on call physician for any further recs. The amiodarone was then shut off after family members who weren't at the hospital were able to drive in to  see Adia.    Discussed with Paolo Rhodes and Renato Zelaya MD  General Surgery PGY-3

## 2017-07-09 NOTE — PROGRESS NOTES
Pt had unanticipated changed from confused and disoriented to completely unresponsive. At 1800 had BM and pt became unresponsive , sternal rub attempted frequently with no outcome and Narcan given twice with no effect.  Rapid response called and code blue was called but cancelled . Primary Dr Garcia and Erasmo came at bedside . Stat lab , chest-xray and EKG done. VS were stable and her pupil active , however she needed more O2 8 LPM by oxi-puls and was de sating, her airway appears Compromised.     Family happen to be in town and came to visit pt and pt's  also  Happen to came to visit pt at that time . Family had care conference with primary Dr . Pt DNR/DNI and comfort cares initiated.

## 2017-07-09 NOTE — PLAN OF CARE
Problem: Goal Outcome Summary  Goal: Goal Outcome Summary  Occupational Therapy Discharge Summary     Reason for therapy discharge:    Change in medical status.     Progress towards therapy goal(s). [facility] for goal details.  Goals partially met.  Barriers to achieving goals:   Transitioned to comfort cares..     Therapy recommendation(s):    Pt transitioned to comfort cares.

## 2017-07-09 NOTE — PLAN OF CARE
Problem: Goal Outcome Summary  Goal: Goal Outcome Summary  PT 6B: Cancel - Following discussion with RN and chart review, pt transitioned to comfort cares and no longer appropriate for therapy at this time. Will complete orders. Thank you for your referral.     Physical Therapy Discharge Summary    Reason for therapy discharge:    Change in medical status.    Progress towards therapy goal(s). See goals on Care Plan in [facility] electronic health record for goal details.  Goals not met.  Barriers to achieving goals:   transition to comfort cares.    Therapy recommendation(s):    No further therapy is recommended.

## 2017-07-09 NOTE — PROGRESS NOTES
"   Pt dyspneic at times but no acute distress noted,  On oxi-puls O2 for a comfort. Pt is relaxed not intense, \" seems in peace \" per family. All her family were at bedside and said their good byes . They  Want [place]  and cremation services at   [address], [place] .  [phone] ,  home .    Family also expressed that they do not want Autopsy .   Continue to porvide comfort cares and update family with any changes.   "

## 2017-07-09 NOTE — PROGRESS NOTES
07/08/2017  General Surgery Cross Cover Note    Was called to bedside to evaluate patient Adia Del Valle for a rapid response. Adia was found unresponsive by her nurse. A rapid response was called. Her vital signs were stable, but she did not reply when asked to respond, nor did she respond to sternal rubbing. Family was present at the time.    Exam:   /56 (BP Location: Left arm)  Pulse 92  Temp 98.4  F (36.9  C) (Axillary)  Resp 12  Wt 61.8 kg (136 lb 3.9 oz)  LMP  (LMP Unknown)  SpO2 96%  BMI 23.57 kg/m2  General: Non-arousable, increasing O2 needs to 6L O2 with a non-rebreather mask  Resp: Obtunded  Cardiac: Hemodynamically stable  Abdomen: Soft, incision c/d/i. Fistula output draining yellowish material.  Incision: c/d/i without erythema, warmth, or discharge.  Extremities: No LE edema or obvious joint abnormalities  Skin: Warm and dry, no jaundice or rash    I/O last 3 completed shifts:  In: 2347.66 [I.V.:560; NG/GT:15]  Out: 1792 [Urine:1750; Drains:42]    Assessment:  89F with perforated duodenal ulcer s/p exploratory laparoscopy, primary duodenal ulcer repair, Juan J patch 6/18/17.  Post op a fib. Continued leak managed medically. 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 Rapid response called this evening.    Plan:  Family was involved in the decision making process and wished for Adia to receive comfort cares.       Gigi Zimmerman DO  General Surgery PGY 1   [phone]

## 2017-07-09 NOTE — PROVIDER NOTIFICATION
07/08/17 1800   Call Information   Date of Call 07/08/17   Time of Call 1812   Name of person requesting the team Diandra   Title of person requesting team RN   RRT Arrival time 1814   Time RRT ended 1918   Reason for call   Type of RRT Adult   Primary reason for call Sudden or unanticipated change in patient condition   Was patient transferred from the ED, ICU, or PACU within last 24 hours prior to RRT call? Yes   SBAR   Situation Within last hour patient's status changed from alert and disoriented to completely unresponsive   Background duodenal perforation   Notable History/Conditions Cardiac;Recent surgery   Assessment patient unresponsive, no change after 2 doses of Narcan, airway appears compromised   Interventions CXR;ECG;Labs;O2 per N/C or mask;Blood glucose   Patient Outcome   Patient Outcome Code status altered  (Comfort Cares initiated)   RRT Team   Attending/Primary/Covering Physician Gigi Zelaya MD   Date Attending Physician notified 07/08/17   Time Attending Physician notified 1815   Physician(s) Gigi Zimmerman MD   Lead RN Everardo Gonzales, RN   RN Diandra Murray, RN   RT Nolberto Salgaod, RT

## 2017-07-10 NOTE — PLAN OF CARE
Problem: Goal Outcome Summary  Goal: Goal Outcome Summary  Outcome: Adequate for Discharge Date Met:  07/10/17  Patient  0500. Primary team to notify the family. No belongings on 7C. Family requested no autopsy.

## 2017-07-10 NOTE — PROGRESS NOTES
Death Note    Patient : Adia Del Valle; 89 year old  MRN# [id number]  Unit: 7C  Admit Date: 2017  Primary Service: General Surgery  Attending: Rolly Fletcher MD    SUBJECTIVE: I was called to the bedside by the patient's nurse after the patient was found without pulse and not breathing.      OBJECTIVE:   On exam,  She is not awake or alert. Pupils are fixed and dilated. There is no response to stimulation.  She is apneic and without pulse.  There are no heart sounds after listening for sixty seconds.    ASSESSMENT:  She is declared dead at 0500.  The family was called to inform, they had said their good byes to the patient yesterday and would appreciate assistance in transfer to the  house.  Autopsy was discussed and the family refused for it.      Edgar Moeller  Surgery Resident  7/10/2017  6:36 AM

## 2017-07-10 NOTE — PROGRESS NOTES
SPIRITUAL HEALTH SERVICES  [facility] 7C   ON-CALL VISIT    REFERRAL SOURCE: [facility] consult for emotional support    Pt Adia was unconscious at the time of my attempt and actively dying. She was moved to comfort cares last PM, and this order was put in as a routine consult at that time. Family had departed when I visited today, and I briefly sat with Adia and offered her words of comfort an assurance at this time of transition for her body and spirit.    PLAN: No follow up planned.      Mallorie Cote MDiv, [facility]  Staff   Pager [phone]    * [facility] remains available 24/7 for emergent requests/referrals, either by having the switchboard page the on-call  or by entering an ASAP/STAT consult in Epic (this will also page the on-call ).*

## 2017-07-10 NOTE — DISCHARGE SUMMARY
"[facility]  Discharge Summary  General Surgery     Adia Del Valle MRN# [id number]   YOB: 1924 Age: 89 year old     Date of Admission:  6/18/2017  Date of Discharge::  7/10/2017  5:00 AM  Admitting Physician:  Josefina Sr MD  Discharge Physician:  Dr. Andrea  Primary Care Physician:        Heide Murphy          Admission Diagnoses:   Perforated viscus [R19.8]  Enterocutaneous fistula [K63.2]         Discharge Diagnosis:   Perforated viscus [R19.8]  Enterocutaneous fistula [K63.2]         Procedures:   Procedure(s):  Upper Endoscopy with Nasogastric tube Placement - Wound Class: II-Clean Contaminated  Surgeon: Dr. Escalante  Date: 7/7/17    Laparoscopic Exploration of Abdomen, Upper Endoscopy, Closure of duodenal Ulcer, Modified Juan J Patch    Surgeon: Dr. Fletcher  Date: 6/18/17          Brief History of Illness:   Taken from Admission H&P:  \"Adia Del Valle is a(n) 89 year old year old female who presents as a transfer from [place] for perforated viscus. She complains of 3 hours of abdominal pain prior to presentation. PMH significant for a history of hypertension, hyperlipidemia, spinal stenosis, and cholecystectomy in 2007. Patient states that she has had abdominal pain over the past 2-3 months. She never had abdominal pain issues prior to that. Her abdominal pain became severe and came to the ED via ambulance. Here the pain has subsided a little bit. She describes it as \"gas pains\" that radiated to her back. She takes aleve regularly. She has never been on an acid reducing medication. Her last bowel movement was a runny stool yesterday, none since. She denies dysuria, hematuria, or bloody stools.\"           Hospital Course:   89F with perforated duodenal ulcer s/p exploratory laparoscopy, primary duodenal ulcer repair, Juan J patch 6/18/17.  Post op a fib managed medically. Patient noted to have a continue leak based on drain output and post op CT. " "Managed the leak conservatively with drains in attempts to allow the perforation to seal off. Repeat CT demonstrated ongoing leak and a developing EC fistula to the supraumbilical incision. Patient developed another episode of a fib with RVR requiring transfer to ICU. She was treated with amiodarone and became rate controlled. She transferred to the floor but clinically deteriorated and became progressively lethargic. Goals of care discussion with family determined that patient would want to be made comfort cares only. Time of death was 0500 on 7/10/17. Family declined autopsy.         Day of Discharge Physical Exam:   See death note         Final Pathology Result:   SPECIMEN(S):   Lymph node, celiac     FINAL DIAGNOSIS:    DESIGNATED \"CELIAC LYMPH NODE\", BIOPSY:   - Benign fibroadipose tissue only, no residual lymph node identified   - Negative for malignancy          Discharge Instructions and Follow-Up:   Family notified and provided with contact information if needed.        Home Health Care:   Not needed           Discharge Disposition:   Patient  during this admission      Condition at discharge:          Consultations:   PHYSICAL THERAPY ADULT IP CONSULT  OCCUPATIONAL THERAPY ADULT IP CONSULT  VASCULAR ACCESS CARE ADULT IP CONSULT  VASCULAR ACCESS CARE ADULT IP CONSULT  VASCULAR ACCESS ADULT IP CONSULT  VASCULAR ACCESS CARE ADULT IP CONSULT  VASCULAR ACCESS CARE ADULT IP CONSULT  VASCULAR ACCESS CARE ADULT IP CONSULT  PHYSICAL MEDICINE & REHAB GENERAL ADULT IP CONSULT  MEDICATION HISTORY IP PHARMACY CONSULT  RESPIRATORY CARE IP CONSULT  OCCUPATIONAL THERAPY ADULT IP CONSULT  GI PANCREATICOBILIARY ADULT IP CONSULT  VASCULAR ACCESS CARE ADULT IP CONSULT  SPIRITUAL HEALTH SERVICES IP CONSULT         Imaging Studies:     Results for orders placed or performed during the hospital encounter of 17   CT Abdomen Pelvis w Contrast    Narrative    CT ABDOMEN PELVIS W CONTRAST 2017 2:18 " AM    HISTORY: Left-sided abdominal pain.    COMPARISON: None.    TECHNIQUE:  Abdomen and pelvis CT was performed following intravenous  administration of 65 cc Isovue-370.  Radiation dose for this scan was  reduced using automated exposure control, adjustment of the mA and/or  kV according to patient size, or iterative reconstruction technique.    FINDINGS: Basilar scarring. No pleural effusion.    Large amount of free intraperitoneal air. The wall of the stomach is  indistinct and the appearance is suspicious for a ruptured gastric  ulcer. There are moderately dilated loops of inflamed appearing small  bowel in the left hemiabdomen associated with some free fluid.  Remainder of the small and large bowel has normal caliber. No  loculated collection to suggest abscess.    Intrahepatic biliary dilatation. Cholecystectomy clips. Spleen,  pancreas, adrenal glands and kidneys are unremarkable.    Multiple uterine fibroids. Urinary bladder is distended with normal  wall thickness. Small amount of free fluid in the dependent pelvis.    Dense atherosclerotic vascular calcification without evidence of  aneurysm.      Impression    IMPRESSION: Large amount of free intraperitoneal air. Indistinct  gastric wall suggests the possibility of a ruptured gastric ulcer.  Moderate free fluid in the abdomen without abscess.    GONZALEZ VEGA MD   Chest  XR, 1 view portable    Narrative    XR CHEST PORT 1 VW 6/18/2017 3:28 AM    HISTORY: Preop.    COMPARISON: None.    FINDINGS: No airspace consolidation, pleural effusion or pneumothorax.  Moderate cardiomegaly.      Impression    IMPRESSION: Cardiomegaly without acute pulmonary abnormality.    GONZALEZ VEGA MD   XR Abdomen Port 1 View    Narrative    XR ABDOMEN PORT F1   6/20/2017 5:44 AM      HISTORY: interval eval    COMPARISON: CT 6/18/2017.    FINDINGS: Surgical drains project over the abdomen. Right upper  quadrant surgical clips. Enteric tube tip projects over the body  the  stomach. Nonobstructive bowel gas pattern. No acute osseous  abnormalities. Degenerative changes of the lower lumbar spine.  Vascular calcifications and tortuous abdominal aorta. Presumed  multiple calcifications in uterine fibroids?      Impression    IMPRESSION:   1. Enteric tube tip projects over the body of the stomach.  2. Nonobstructive bowel gas pattern.  3. Multiple calcified uterine fibroids.    I have personally reviewed the examination and initial interpretation  and I agree with the findings.    KRISH CLIFFORD MD   XR Chest Port 1 View    Narrative    XR CHEST PORT 1 VW  6/20/2017 9:24 AM      HISTORY: RN placed PICC - verify tip placement    COMPARISON: 6/18/2017    FINDINGS: Single portable AP view of the chest supine. New right upper  extremity PICC tip projects over the low SVC. Enteric tube proceeds  below the level of the diaphragm and the inferior margin of the  field-of-view. Cardiac silhouette is stably enlarged. Pulmonary  vascular markings are prominent. Small left pleural effusion.  Bilateral retrocardiac and perihilar opacities concerning for  developing edema or infection. The visualized upper abdomen, osseous  structures, and soft tissues are unremarkable.      Impression    IMPRESSION:   1. Right upper extremity PICC tip over the low SVC. Otherwise stable  support devices.  2. Pulmonary vascular congestion and perihilar/bilateral retrocardiac  opacities concerning for edema or infection.   3. Small left pleural effusion, new from prior study.    I have personally reviewed the examination and initial interpretation  and I agree with the findings.    TAMMY NAVARRO MD   CT Abdomen Pelvis w Contrast    Narrative    Exam: CT abdomen pelvis with contrast 6/24/2017 1:02 PM.    History: Elevated white blood cell count. Patient with recent  operative repair of perforated duodenal ulcer..    Comparison: CT dated 6/18/2017.    Technique: CT images from the lung bases through the symphysis  pubis  after the uneventful administration of IV and oral contrast.    Findings:   Surgical changes of laparoscopic abdominal surgery and Juan J patch  placement for perforated duodenal ulcer. Percutaneous drainage  catheters lie anterior to the liver and within the left upper  quadrant. Decreased, but persistent, free air along the anterior  abdomen (series 2 image 36) and anterior to the descending duodenum  (series 2 image 36). Free fluid tracks along the liver, decreased when  compared with presurgical CT. No new fluid collection/abscess  appreciated.    Mucosal thickening of the gastric antrum and duodenum, likely  secondary to inflammatory process and recent surgical changes.  Additional hazy infiltration of the fat about the colon is favored to  be postsurgical. No dilated loops of bowel. No pneumatosis or portal  venous gas. No significant free fluid in the pelvis. No  intra-abdominal, retroperitoneal or inguinal lymphadenopathy by size  criteria.    Surgical changes of cholecystectomy with mild intrahepatic biliary  dilatation.. The liver, spleen, pancreas and adrenal glands are  unremarkable. Symmetric nephrographic enhancement of the kidneys.  Subcentimeter renal hypodensity superior pole the left kidney is too  small to definitely characterize but may represent simple renal cysts.  Urinary bladder is partially distended with air in the bladder,  presumably secondary to recent instrumentation. Multiple calcified  fibroids of the uterus. Major vessels of the abdomen are patent.  Atherosclerotic ossifications of the abdominal aorta without  aneurysmal dilatation.    Bones and soft tissues: Multilevel degenerative changes of the spine.  No acute osseous abnormalities or suspicious osseous lesions.      Impression    Impression:   1. Surgical changes of laparoscopic abdominal surgery and patch  placement for perforated duodenal ulcer. Percutaneous drainage  catheters lie anterior to the liver and within the  left upper  quadrant. Overall pneumoperitoneum and intra-abdominal free fluid has  decreased compared with 6/18/2017. No new drainable collections,  however, leak cannot be entirely excluded. Consider upper GI exam if  clinically indicated.  Some persistent areas of ascites and  pneumoperitoneum area present surrounding the duodenum and liver.   2. Slight irregular contour to the gastric antrum and gastric wall  thickening, with small locules of gas along the anterior gastric wall,  which is likely postprocedural and secondary to prior ulceration.   Component of persistent leak can not completely be excluded, although  findings may represent interval resolution of perforated gas.  Continued attention on follow-up recommended.  Overall exam improved.   3. Katarzyna mesentery in the central and right abdomen is presumably  secondary to intraabdominal free fluid and recent surgery.  4. Fibroid appearing uterus is stable, with stable or mildly increased  prominence of the endometrium. Attention on follow-up imaging is  recommended.   5. Gas in urinary bladder, presumably from instrumentation/catheter.    I have personally reviewed the examination and initial interpretation  and I agree with the findings.    TAMMY NAVARRO MD   XR Abdomen Port 1 View    Narrative    CR abdomen 6/25/2017 3:20 AM    History: NG tube placement    Comparison: 6/20/2017, 6/12/2017    Findings: Single portable AP view of the abdomen. Gastric tube tip and  sidehole project over the left upper quadrant. The sidehole projects  over the GE junction. Bilateral surgical drains are in place.  Cholecystectomy clips. Nonobstructive bowel gas pattern.  Calcifications in the pelvis are stable. Moderate degenerative changes  of the lumbar spine. Left pleural effusion.      Impression    Impression:   1. Gastric tube tip projects over the left upper quadrant, the  sidehole projects over the GE junction.  2. Surgical drains in the abdomen. Nonobstructive  bowel gas pattern.  3. Left pleural effusion.    I have personally reviewed the examination and initial interpretation  and I agree with the findings.    ALLYSON TEIXEIRA MD   CT Abdomen Pelvis w/o Contrast     Value    Radiologist flags Duodenal leak (Urgent)    Narrative    EXAMINATION: CT ABDOMEN PELVIS W/O CONTRAST, 6/27/2017 12:51 PM    TECHNIQUE:  Helical CT images from the lung bases through the  symphysis pubis were obtained with oral contrast.  Coronal and  sagittal reformatted images were generated at a workstation for  further assessment.    CONTRAST:  50 ml Isovue 370, oral.    COMPARISON: CT 6/24/2017, 6/18/2017    HISTORY: Perforated duodenal ulcer, status post exploratory laparotomy  with primary duodenal ulcer repair and Juan J patch.    FINDINGS:    Bowel: A gastric tube terminates in the stomach. Oral contrast is seen  within the stomach and small bowel. There is a tract of air extending  along the anterior duodenal bulb, expanding along the inferior surface  of the liver into the anterior abdomen (series 3, images 21 - 10 and  series 4, images 74 - 81). There is extraluminal contrast seen within  this tract.  Fluid Collections: There is been expansion of the phlegmon, containing  pockets of air and extraluminal contrast, located at the hepatic  flexure (series 5, images 195 through 226). The largest measurable  diameter of this is measures 5.9 x 7.8 cm (series 5, image 226); the  hepatic flexure is just posterior to this region. Stable appearance of  the fluid collection along the anterior tip of the liver, which is  associated with this region. An anterior approach abdominal drain  terminates anterior to segment 3 of the liver, not within the fluid  collection. Right lateral approach abdominal drain terminates in the  left mid abdomen, not within a fluid collection.  Lymph nodes: Reactive mesenteric lymphadenopathy.    Other findings:   Lung bases: Small pleural effusions with associated  obstructive  atelectasis. Trace pericardial effusion.  Liver: No suspicious liver lesions.  Gallbladder: Surgically absent.  Spleen: Normal size.  Pancreas: No suspicious pancreatic lesions. The pancreatic duct is not  dilated. A few calcifications are present in the body of the pancreas,  which may be parenchymal or vascular in origin.  Adrenal glands: No adrenal nodules. Nodular thickening of the adrenal  glands.  Kidneys: No hydronephrosis or obstructing renal stones. Multiple  wedge-shaped cortical defects, likely prior insults.   Bladder / Pelvic organs: Del Real catheter present within the urinary  bladder; there is air within the urinary bladder. Myomatous uterus  with multiple calcifications.  Vessels: No infrarenal aortic aneurysm. Anemia. Tortuous abdominal  aorta.    Bones and soft tissues: Degenerative changes of the spine, most  prominent at levels L3-L4 and L5-S1. Lumbarized S1.       Impression    IMPRESSION:   1. Evidence of reperforation of the anterior duodenal bulb wall, with  extraluminal oral contrast and air extending along the anterior  abdominal wall. There is a new phlegmon adjacent to the hepatic  flexure, which is continuous with the fluid collection at the anterior  tip of the liver (which was present on prior exams). Two abdominal  drains are present, neither of which terminate in a fluid collection.  2. Small pleural effusions with associated atelectasis.  3. Del Real catheter and a small amount of gas within the urinary  bladder, likely related, but underlying urinary tract infection cannot  be excluded.     [Urgent Result: Duodenal leak]    Finding was identified on 6/27/2017 2:21 PM.     Dr. Reagan Marshall was contacted by Dr. Pacheco Henderson at 6/27/2017 2:22 PM  and verbalized understanding of the urgent finding.     I have personally reviewed the examination and initial interpretation  and I agree with the findings.    MARTHA MESA   XR Chest 2 Views    Narrative    XR CHEST 2 VW  6/27/2017  12:48 PM      HISTORY: interval eval    COMPARISON: 6/20/2017    FINDINGS: AP and lateral views of the chest upright. Right upper  extremity PICC tip projects over the mid to high SVC. Enteric tube  proceeds below the level of the diaphragm and the inferior margin of  the field-of-view. The cardiomediastinal silhouette and pulmonary  vasculature are stable and within normal limits. Left pleural effusion  and adjacent compressive atelectasis. The visualized upper abdomen,  osseous structures, and soft tissues are unremarkable.      Impression    IMPRESSION:   1. Stable positioning of support devices as above.  2. Interval improvement of pulmonary vascular congestion and edema.  3. Stable small left pleural effusion and adjacent compressive  atelectasis.    I have personally reviewed the examination and initial interpretation  and I agree with the findings.    ALLYSON TEIXEIRA MD   XR Chest Port 1 View    Narrative    Single view chest    Comparisons: 6/27/2017    HISTORY: Evaluate for pneumonia.    FINDINGS: Right-sided PICC line remains in place. Lung volumes are  low. The patient is rotated to the right. Persistent blunting of the  left costophrenic angle. Mild stranding opacities at both lung bases,  similar to the prior.      Impression    IMPRESSION: No significant change compared to 6/27/2017. Small left  pleural effusion and probable mild bibasilar atelectasis.    LOPEZ SUH MD   CT Abdomen Pelvis w Contrast    Narrative    EXAMINATION: CT ABDOMEN PELVIS W CONTRAST, 7/5/2017 2:14 PM    TECHNIQUE:  Helical CT images from the lung bases through the  symphysis pubis were obtained  with IV contrast. Contrast dose:  iopamidol (ISOVUE-370) solution 85 mL    COMPARISON: CT 6/27/2017    HISTORY: With PO contrast please. Eval for leak from duodenal perf s/p  duodenal perforation    FINDINGS:  Nasogastric tube removed. Oral contrast is seen within the stomach,  and small bowel to the terminal ilium. The  discrete tract of air  extending from the duodenal bulb to the anterior abdominal wall is no  longer visualized, however there is persistent free abdominal air that  infiltrates the surgical breech. Air extends through the ventral  abdominal fascia in the midline about the umbilicus. There are  predominantly air with some fluid containing multiloculated  collections surrounding the duodenal bulb and second portion of the  duodenum as seen on series 5 image 156 and series 5 image 127. One of  the air collections in the right retroperitoneal  region measuring 2.6  x 3.0 cm contains dependent hyperdensity, suggestive of contained  contrast extravasation, series 5 image 132. There is now an organized  peripherally enhancing 1.6 x 1.8 x 3.5 cm fluid collection along the  inferior right hepatic lobe. Foci of air continuing to track  inferiorly to the phlegmonous change along the hepatic flexure which  has enlarged and contains more air than seen previously.  Unchanged position of the existing drains that terminate anterior to  segment 3 and in the left mid abdomen, none of them within a fluid  collection.  Unchanged reactive mesenteric lymphadenopathy. Persistent wall  thickening along the gastric antrum and hepatic flexure with  associated inflammatory stranding, also likely reactive.    Lung bases: Reduced bilateral basilar pleural effusion and  atelectasia, especially on the right side.  Liver: Slightly dilated intrahepatic bile ducts. Common bile duct  measures 10 mm. The gallbladder is surgically absent.  Spleen: Normal size.  Pancreas: No suspicious pancreatic lesions. No pancreatic duct  dilation  Adrenal glands:  Nodular thickening of the adrenal glands, unchanged.  Kidneys: Subcentimeter left renal hypodensities stable. New mild  bilateral hydronephrosis and hydroureter with a markedly distended  bladder.  Bladder / Pelvic organs: Hyperdistended urinary bladder with minimal  residual air.   Persistent Myomatous  uterus with multiple calcifications.  Vessels: No significant changes  Bones and soft tissues: No significant changes      Impression    IMPRESSION:   1.  Predominantly air with some fluid containing multiloculated  collections surrounding the duodenal bulb and second portion of the  duodenum. One of the air collections in the right retroperitoneal  region measuring 3.0 cm contains dependent hyperdensity, suggestive of  contained oral contrast extravasation.  2.  Persistent free air with air now tracking throughout the midline  ventral abdominal wall and inferiorly along the right hepatic lobe  with increased air associated with phlegmonous change along the  hepatic flexure. There is now an organized peripherally enhancing  fluid collection along the inferior right hepatic lobe.  3.  Mild intrahepatic and extrahepatic biliary ductal dilatation.  4.  New mild bilateral hydronephrosis and hydroureter with markedly  distended bladder.  5.  Slightly improved right pleural effusion with persistent left  pleural effusion and bibasilar atelectasis.    I have personally reviewed the examination and initial interpretation  and I agree with the findings.    JOSÉ MIGUEL DUENAS MD   XR Chest Port 1 View    Narrative    Exam: XR CHEST PORT 1 VW, 7/6/2017 11:16 AM    Indication: Evaluate for PNA    Comparison: 7/4/2017.    Findings:   Single AP view the chest. Right-sided PICC with tip at the mid SVC.  Patient is rotated to the right. There is stable rightward deviation  of the trachea and mediastinum. Low lung volumes. Increased perihilar  and bibasilar interstitial lung markings. Right midlung platelike  atelectasis. Patchy perihilar and bibasilar opacities Small bilateral  pleural effusions. No pneumothorax.      Impression    Impression:   1. Small bilateral pleural effusions with underlying atelectasis  versus consolidation.  2. Increased interstitial lung markings and perihilar fullness likely  representing interstitial  edema.  3. Platelike atelectasis in the right midlung.    I have personally reviewed the examination and initial interpretation  and I agree with the findings.    ALLYSON TEIXEIRA MD   XR Surgery BABAR G/T 5 Min Fluoro w Stills    Narrative    This exam was marked as non-reportable because it will not be read by a   radiologist or a [place] non-radiologist provider.             XR Chest Port 1 View    Narrative     Examination:  XR CHEST PORT 1 VW     Date:  7/8/2017 7:00 PM      Clinical Information: unresponsive     Additional Information: none    Comparison: 7/6/2017    Findings:   Right arm PICC line with tip in the upper superior vena cava. Enteric  tube coiled in the stomach tip off the bottom of the image.    The pulmonary vasculature is engorged and indistinct. Cardiac  silhouette is enlarged. Right perihilar groundglass opacities.  Blunting of the right costophrenic angle. Silhouetting of left  hemidiaphragm and opacity in the left lung base. No acute osseous  injury.      Impression    Impression:  1. No change in left pleural effusion with associated atelectasis or  consolidation.  2. Increase in pulmonary vascular congestion.    MARTHA HAMILTON MD              Medications Prior to Admission:     No prescriptions prior to admission.              Discharge Medications:     Discharge Medication List as of 7/10/2017  7:04 AM      CONTINUE these medications which have NOT CHANGED    Details   ASPIRIN PO Take 81 mg by mouth daily, Historical      ACETAMINOPHEN PO Take 500 mg by mouth nightly as needed for pain, Historical      Multiple Vitamins-Minerals (MULTIVITAMIN ADULT PO) Take 1 tablet by mouth every morning, Historical      senna-docusate (SENOKOT-S;PERICOLACE) 8.6-50 MG per tablet Take 1 tablet by mouth 2 times daily as needed for constipation, Historical      hydrochlorothiazide (HYDRODIURIL) 25 MG tablet TAKE 1/2 TABLET(12.5 MG) BY MOUTH DAILY, Disp-45 tablet, R-0, E-PrescribeLast refill needs visit       Naproxen Sodium (ALEVE PO) Take 440 mg by mouth every morning with breakfast, Historical      polyethylene glycol (MIRALAX/GLYCOLAX) powder Take 17 g by mouth daily as needed for constipation , Historical      Multiple Vitamins-Minerals (PRESERVISION AREDS 2) CAPS Take 1 capsule by mouth daily , Disp-60 capsule, R-0, Historical      Tetrahydrozoline HCl (EYE DROPS OP) Apply 1 drop to eye 2 times daily as needed EYE DROPS , Historical             Pt was seen and discussed with Dr. Andrea on 7/9/17    Deana Wong MD  General Surgery, PGY-2  Pg [phone]

## 2017-07-11 LAB — INTERPRETATION ECG - MUSE: NORMAL

## 2017-07-12 LAB
BACTERIA SPEC CULT: NO GROWTH
BACTERIA SPEC CULT: NO GROWTH
MICRO REPORT STATUS: NORMAL
MICRO REPORT STATUS: NORMAL
SPECIMEN SOURCE: NORMAL
SPECIMEN SOURCE: NORMAL

## 2017-09-18 NOTE — PLAN OF CARE
Natalya Will is a 62 y.o. female and presents with Follow-up (3 month for PCV, FBOT); Medication Refill (orders pending. .. pt states that she would like a new allergy medication because the xyzal isn't covered by the insurance pt states she would like something for sleep); and Labs (pt states she would like labs to check her thyroid levels, and her liver enzymes)    Subjective:  Pt here f/u hemorrhoidectomy. Had continued bleeding following procedure. No longer with rectal bleeding now. Insomnia Review:  Pt presents for insomnia, reporting difficulty falling and staying asleep. Started months ago, unchanged. Aggravated by nothing. Alleviated by Rx meds: ativan. Tried Trazodone without relief and feeling of agitation. Related symptoms include fatigue, difficulty concentrating, daytime drowsiness. Denies h/o PTSD, substance abuse, and alcoholism. Also released from liver specialist and advised to have labs checked periodically with PCP. No abd pain or fluid overload reported. Lastly, had EMG done with pain specialist and advised of neuropathy. Encouraged to have testing for diabetes and thyroid disease, although pt without symptoms. Pt would like testing today however.      Review of Systems  Constitutional: negative for fevers, chills, anorexia and weight loss  Eyes:   negative for visual disturbance, drainage, and irritation  ENT:   + AR, resolved on oral meds and Flonase. negative for tinnitus,sore throat,nasal congestion,ear pain,and hoarseness  Respiratory:  negative for cough, hemoptysis, dyspnea, and wheezing  CV:   negative for chest pain, palpitations, and lower extremity edema  GI:   negative for nausea, vomiting, diarrhea, abdominal pain, and melena  Endo:               negative for polyuria,polydipsia,polyphagia, and heat intolerance  Genitourinary: negative for frequency, urgency, dysuria, retention, and hematuria  Integument:  negative for rash, ulcerations, and pruritus  Hematologic: Problem: Goal Outcome Summary  Goal: Goal Outcome Summary  PT 5B- pt seen with rehab tech for transfers, bed mobility and LE exercise. Pt was unable to follow directions well today. Pt with delirium. Pt needed mod A of 2 for bed mobility and sit to stand. Pt could not stand up without posterior lean and did not tolerate standing more than 30 sec. Pt unable to take steps as she did yesterday. Recommend TCU.       negative for easy bruising and bleeding  Musculoskel: negative for arthralgias, muscle weakness,and joint pain/swelling  Neurological:  negative for headaches, dizziness, vertigo,and memory/gait problems  Behavl/Psych: negative for feelings of anxiety, depression, suicide, and mood changes    Past Medical History:   Diagnosis Date    Arthritis     Cancer ([facility] [address]     skin - leg    Chronic pain     GERD (gastroesophageal reflux disease)     Liver disease     hep-c     Past Surgical History:   Procedure Laterality Date    BIOPSY LIVER      BREAST SURGERY PROCEDURE UNLISTED  lymph node removed 4/8/15    HX APPENDECTOMY  12/30/2014    Laparoscopic Appendectomy    HX BLADDER SUSPENSION      HX BREAST BIOPSY Left 2015    HX BREAST LUMPECTOMY Left 4/8/15    with radiation     HX CYST INCISION AND DRAINAGE Left 4/8/2015    INCISION AND DRAINAGE BREAST performed by Micky Hidalgo [facility] MAIN OR    HX GYN      hysterectomy    HX ORTHOPAEDIC      shoulder and heel spurs    HX OTHER SURGICAL      Teeth implants     Social History     Social History    Marital status:      Spouse name: N/A    Number of children: N/A    Years of education: N/A     Social History Main Topics    Smoking status: Former Smoker     Packs/day: 1.00     Years: 40.00     Types: Cigarettes     Quit date: 4/15/2015    Smokeless tobacco: Never Used      Comment: using vapor e-cigaretts    Alcohol use Yes      Comment: occasional    Drug use: No    Sexual activity: Not Asked     Other Topics Concern    None     Social History Narrative     Family History   Problem Relation Age of Onset    Diabetes Mother     Heart Disease Father     Hypertension Father     Diabetes Father      Current Outpatient Prescriptions   Medication Sig Dispense Refill    suvorexant (BELSOMRA) 10 mg tablet Take 1 Tab by mouth nightly as needed for Insomnia. Max Daily Amount: 10 mg.  Indications: INSOMNIA 30 Tab 5    fluticasone (FLONASE) 50 mcg/actuation nasal spray 2 Sprays by Both Nostrils route daily. 1 Bottle 11    pneumococcal 13 mahendra conj dip (PREVNAR-13) 0.5 mL syrg injection 0.5 mL by IntraMUSCular route once for 1 dose. 0.5 mL 0    raNITIdine (ZANTAC) 150 mg tablet 300 mg.  MV,CALCIUM,MIN/IRON/FOLIC/VITK (MULTI FOR HER PO) Take  by mouth.  cholecalciferol (VITAMIN D3) 1,000 unit tablet Take  by mouth daily.  diclofenac (VOLTAREN) 1 % gel Apply 4 g to affected area every six (6) hours. 100 g 11    methocarbamol (ROBAXIN) 500 mg tablet Take 1 Tab by mouth four (4) times daily as needed for Pain (m. spasms). Do NOT Drive, may cause drowsiness 60 Tab 2    meloxicam (MOBIC) 7.5 mg tablet Take 1 Tab by mouth daily. 30 Tab 11    magnesium oxide (MAG-OX) 400 mg tablet Take 400 mg by mouth daily.  docusate sodium (COLACE) 100 mg capsule Take 1 Cap by mouth two (2) times a day. May use over-the counter equivalent instead. Take twice daily while on narcotic pain reliever to prevent constipation. 60 Cap 0    GLUCOSAMINE HCL/CHONDR PORTILLO A NA (OSTEO BI-FLEX PO) Take  by mouth.  HYDROcodone-acetaminophen (NORCO) 7.5-325 mg per tablet Take 1 tablet by mouth every six (6) hours as needed. 30 tablet 0    LORazepam (ATIVAN) 2 mg tablet Take 2 mg by mouth nightly.  fiber cap Take 2 Caps by mouth daily.  Omega-3-DHA-EPA-Fish Oil 1,000 (120-180) mg cap Take 1 Cap by mouth daily.  levocetirizine (XYZAL) 5 mg tablet Take 1 Tab by mouth daily.  30 Tab 11     Allergies   Allergen Reactions    Pcn [Penicillins] Anaphylaxis       Objective:  Visit Vitals    /72 (BP 1 Location: Left arm, BP Patient Position: Sitting)    Pulse 64    Temp 98.2 °F (36.8 °C) (Oral)    Resp 18    Ht 5' 6\" (1.676 m)    Wt 192 lb 3.2 oz (87.2 kg)    SpO2 94%    BMI 31.02 kg/m2     Wt Readings from Last 3 Encounters:   09/18/17 192 lb 3.2 oz (87.2 kg)   06/08/17 191 lb (86.6 kg)   11/14/16 191 lb 2.2 oz (86.7 kg)     Physical Exam: General appearance - alert, well appearing, and in no distress. Mental status - A/O x 4, normal mood and affect. Neck -Supple ,normal CSP. FROM, non-tender. No significant adenopathy/thyromegaly. No JVD. Chest - CTA. Symmetric chest rise. No wheezing, rales or rhonchi. Heart - Normal rate, regular rhythm. Normal S1, S2. No MGR or clicks. Abdomen - Soft,non-distended. Normoactive BS in all quadrants. NT, no mass or HSM. Ext- Radial, DP pulses, 2+ bilaterally. No pedal edema, clubbing, or cyanosis. Skin-Warm and dry. No hyperpigmentation, ulcerations, or suspicious lesions. Neuro - Normal speech, no focal findings or movement disorder. Normal strength, gait, and muscle tone. Assessment/Plan:  Trazodone stopped, trial of Belsomra prescribed. Labs ordered. Medication Side Effects and Warnings were discussed with patient: yes   Patient Labs were reviewed: yes  Patient Past Records were reviewed: yes    See below for other orders   Follow-up Disposition:  Return in about 6 months (around 3/18/2018) for 6 month f/u. Pt has given consent verbally while in office for [employer]. ICD-10-CM ICD-9-CM    1. Neuropathy (HCC) G62.9 355.9 HEMOGLOBIN A1C WITH EAG      TSH 3RD GENERATION   2. Chronic back pain, unspecified back location, unspecified back pain laterality M54.9 724.5     G89.29 338.29    3. Screening for endocrine, nutritional, metabolic and immunity disorder Z13.29 V77.99 HEMOGLOBIN A1C WITH EAG    Z13.21  TSH 3RD GENERATION    E43.131  METABOLIC PANEL, COMPREHENSIVE    Z13.0  CBC WITH AUTOMATED DIFF   4. Screening for lipid disorders Z13.220 V77.91 LIPID PANEL   5. Colon cancer screening Z12.11 V76.51 OCCULT BLOOD, IMMUNOASSAY (FIT)   6. Insomnia, unspecified type G47.00 780.52 suvorexant (BELSOMRA) 10 mg tablet   7.  Non-seasonal allergic rhinitis, unspecified allergic rhinitis trigger J30.89 477.8 fluticasone (FLONASE) 50 mcg/actuation nasal spray     Orders Placed This Encounter    HEMOGLOBIN A1C WITH EAG    TSH 3RD GENERATION    OCCULT BLOOD, IMMUNOASSAY (FIT)    LIPID PANEL    METABOLIC PANEL, COMPREHENSIVE    CBC WITH AUTOMATED DIFF    suvorexant (BELSOMRA) 10 mg tablet     Sig: Take 1 Tab by mouth nightly as needed for Insomnia. Max Daily Amount: 10 mg. Indications: INSOMNIA     Dispense:  30 Tab     Refill:  5    fluticasone (FLONASE) 50 mcg/actuation nasal spray     Si Sprays by Both Nostrils route daily. Dispense:  1 Bottle     Refill:  11    pneumococcal 13 mahendra conj dip (PREVNAR-13) 0.5 mL syrg injection     Si.5 mL by IntraMUSCular route once for 1 dose. Dispense:  0.5 mL     Refill:  0       Saray Fried expressed understanding of plan. An After Visit Summary was offered/printed and given to the patient.

## 2018-01-24 NOTE — ED PROVIDER NOTES
"  History     Chief Complaint   Patient presents with     Abdominal Pain     ABD pain x 3 hours today; describes as \"gas pain\". Last BM yesterday. C/O runny stool. Was seen in clinic last week for similar; was given miralax but did not take as directed. 4mg zofran in rig.     HPI  Adia Del Valle is a 89 year old female who presents with abdominal pain.  She has had abdominal pain over the past few months but became more severe a few hours ago.  She was seen in clinic last wee and was given mairalax.  She states this did not help.  She states her pain became severe tonight.  She also had some loose stool.  The pain radiates to her back.  No urinary symptoms.       PAST MEDICAL HISTORY:   Past Medical History:   Diagnosis Date     Cardiomegaly      Cholecystitis, unspecified      Disorder of bone and cartilage, unspecified      Generalized osteoarthrosis, unspecified site      Lump or mass in breast      Pure hypercholesterolemia      Unspecified essential hypertension      Unspecified venous (peripheral) insufficiency        PAST SURGICAL HISTORY:   Past Surgical History:   Procedure Laterality Date     CATARACT IOL, RT/LT       CHOLECYSTECTOMY, LAPOROSCOPIC  8/07    Cholecystectomy, Laparoscopic     COLONOSCOPY       ENDOSCOPIC INSERTION TUBE JEJUNOSTOMY N/A 7/7/2017    Procedure: ENDOSCOPIC INSERTION TUBE JEJUNOSTOMY;  Upper Endoscopy with Nasogastric tube Placement;  Surgeon: Jonathan Escalante MD;  Location: [facility] OR     ENDOSCOPIC RELEASE CARPAL TUNNEL  4/25/2012    Procedure:ENDOSCOPIC RELEASE CARPAL TUNNEL; RIGHT ENDOSCOPIC CARPAL TUNNEL RELEASE; Surgeon:SERG PERKINS; Location:[facility]     GASTROSCOPY N/A 6/18/2017    Procedure: GASTROSCOPY;;  Surgeon: Rolly Fletcher MD;  Location: [facility] OR     LAPAROSCOPIC CHOLECYSTECTOMY N/A 6/18/2017    Procedure: LAPAROSCOPIC CHOLECYSTECTOMY;  Laparoscopic Exploration of Abdomen, Upper Endoscopy, Closure of duodenal Ulcer, Modified Juan J Patch ;  Surgeon: " Rolly Fletcher MD;  Location: [facility] OR     LAPAROTOMY EXPLORATORY N/A 6/18/2017    Procedure: LAPAROTOMY EXPLORATORY;;  Surgeon: Rolly Fletcher MD;  Location: [facility] OR     PHACOEMULSIFICATION CLEAR CORNEA WITH STANDARD INTRAOCULAR LENS IMPLANT  5/23/2013    Procedure: PHACOEMULSIFICATION CLEAR CORNEA WITH STANDARD INTRAOCULAR LENS IMPLANT;  RIGHT PHACOEMULSIFICATION CLEAR CORNEA WITH STANDARD INTRAOCULAR LENS IMPLANT ;  Surgeon: Vidal Heredia MD;  Location: [facility]     SURGICAL HISTORY OF -       left breast biopsy     SURGICAL HISTORY OF -       left hammertoe and bunionectomy     SURGICAL HISTORY OF -   2008    cataract extraction     SURGICAL HISTORY OF -   2009    left carpal tunnel release     TONSILLECTOMY         FAMILY HISTORY:   Family History   Problem Relation Age of Onset     Arthritis Mother      Respiratory Father      emphysema-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 No family hx of      DIABETES No family hx of      Hypertension No family hx of      CEREBROVASCULAR DISEASE No family hx of      Breast Cancer No family hx of      Cancer - colorectal No family hx of      Prostate Cancer No family hx of        SOCIAL HISTORY:   Social History   Substance Use Topics     Smoking status: Never Smoker     Smokeless tobacco: Never Used     Alcohol use No       Discharge Medication List as of 7/10/2017  7:04 AM      CONTINUE these medications which have NOT CHANGED    Details   ASPIRIN PO Take 81 mg by mouth daily, Historical      ACETAMINOPHEN PO Take 500 mg by mouth nightly as needed for pain, Historical      Multiple Vitamins-Minerals (MULTIVITAMIN ADULT PO) Take 1 tablet by mouth every morning, Historical      senna-docusate (SENOKOT-S;PERICOLACE) 8.6-50 MG per tablet Take 1 tablet by mouth 2 times daily as needed for constipation, Historical      hydrochlorothiazide (HYDRODIURIL) 25 MG tablet TAKE 1/2 TABLET(12.5 MG) BY MOUTH DAILY, Disp-45 tablet,  R-0, E-PrescribeLast refill needs visit      Naproxen Sodium (ALEVE PO) Take 440 mg by mouth every morning with breakfast, Historical      polyethylene glycol (MIRALAX/GLYCOLAX) powder Take 17 g by mouth daily as needed for constipation , Historical      Multiple Vitamins-Minerals (PRESERVISION AREDS 2) CAPS Take 1 capsule by mouth daily , Disp-60 capsule, R-0, Historical      Tetrahydrozoline HCl (EYE DROPS OP) Apply 1 drop to eye 2 times daily as needed EYE DROPS , Historical                Allergies   Allergen Reactions     Codeine Sulfate      Dizzy, nauseated     Tramadol Other (See Comments)     dizzy       I have reviewed the Medications, Allergies, Past Medical and Surgical History, and Social History in the Epic system.    Review of Systems   All other systems reviewed and are negative.      Physical Exam   BP: 130/63  Pulse: 93  Heart Rate: 80  Temp: 97.4  F (36.3  C)  Resp: 24  Weight: 58 kg (127 lb 13.9 oz)  SpO2: 93 %      Physical Exam   Constitutional: No distress.   HENT:   Head: Normocephalic and atraumatic.   Mouth/Throat: Oropharynx is clear and moist. No oropharyngeal exudate.   Eyes: Conjunctivae are normal. Pupils are equal, round, and reactive to light. Right eye exhibits no discharge. Left eye exhibits no discharge.   Neck: Normal range of motion. Neck supple.   Cardiovascular: Normal rate and intact distal pulses.    Pulmonary/Chest: Effort normal. No respiratory distress. She has no wheezes. She has no rales. She exhibits no tenderness.   Abdominal: Soft. There is tenderness. There is no rebound and no guarding.   Musculoskeletal: Normal range of motion. She exhibits no tenderness.   Neurological: She is alert.   Skin: Skin is warm and dry. She is not diaphoretic.   Psychiatric: She has a normal mood and affect. Her behavior is normal.   Nursing note and vitals reviewed.      ED Course     ED Course     Procedures             Critical Care time:  none             Labs Ordered and Resulted  from Time of ED Arrival Up to the Time of Departure from the ED   COMPREHENSIVE METABOLIC PANEL - Abnormal; Notable for the following:        Result Value    Potassium 2.9 (*)     Anion Gap 15 (*)     Glucose 170 (*)     Urea Nitrogen 35 (*)     Albumin 3.1 (*)     Protein Total 6.6 (*)     All other components within normal limits   LIPASE - Abnormal; Notable for the following:     Lipase 1224 (*)     All other components within normal limits   ISTAT  GASES LACTATE MONICA POCT - Abnormal; Notable for the following:     Ph Venous 7.52 (*)     PCO2 Venous 29 (*)     Lactic Acid 3.0 (*)     All other components within normal limits   CBC WITH PLATELETS DIFFERENTIAL   INR   ISTAT CG4 GASES LACTATE MONICA NURSING POCT   ABO/RH TYPE AND SCREEN            Assessments & Plan (with Medical Decision Making)   1.  Perforated viscus    89 yo F who presents with diffuse abdominal pain worsening this evening.  Abdomen is diffusely tender and CT AP shows free air.  She was treated with IV zosyn. Potassium was replaced and pain improved with IV dilaudid.  Surgery to take to OR for repair.    I have reviewed the nursing notes.    I have reviewed the findings, diagnosis, plan and need for follow up with the patient.    Discharge Medication List as of 7/10/2017  7:04 AM          Final diagnoses:   Perforated viscus       6/18/2017   UNIT [facility]     Yogi Verma MD  01/24/18 1774

## 2020-11-23 NOTE — PROGRESS NOTES
Pt arrived to unit 6B from 4A at this time. VSS on RA. Disoriented to situation and time with hallucinations. 2 person skin assessment done by this writer and Bobbi Oconnell RN.    No

## 2021-03-23 NOTE — PROGRESS NOTES
SURGICAL ICU PROGRESS NOTE  6/19/2017    PRIMARY TEAM: General Surgery   PRIMARY PHYSICIAN: BIPIN Fletcher MD    REASON FOR CRITICAL CARE ADMISSION: Post op monitoring   ADMITTING PHYSICIAN: NATHALIA López MD    ASSESSMENT:  Adia Del Valle is a 89 year old year old female --with a past history of HTN, HL, cardiomegaly, cholecystitis s/p lap cholecystectomy (2007), and OA on chronic naproxen-- who was transferred from [place] 6/18 with three days of abdominal pain and imaging consistent with perforated viscus, presumed to be gastric based on imaging. She was taken to OR for exploratory laparoscopy and repair of duodenal ulcer primarily and with Juan J patch 6/18 by Dr Fletcher.    Changes Today:  - Increase dilaudid  - 500cc NS  - Continue monitoring drain output     PLAN:   Neuro/ pain/ sedation:  #Acute post op pain  -Monitor neurological status.  -PRN dilaudid     Pulmonary care:   -Supplemental oxygen to keep saturation above 92 %.  -Incentive spirometer every 15- 30 minutes when awake.  -Capnography     Cardiovascular:     #HTN  #HL  - Hemodynamically stable.   - PRN antihypertensives.   - Hold home meds for now as there is no enteral access.     GI/Nutrition:   #Duodenal perforation s/p repair  -NPO and mIVF  -NG to LIS  -Elevated liver enzymes post op most likely secondary to liver manipulation and bile leak from duodenal perforation. Continue to monitor drain output and trend liver enzymes/lipase/amylase.  -YOSELYN drain cares     Renal/Fluids/ Electrolytes:   -Cr downtrending, continue tomonitor   -Monitor UOP. 500cc NS this morning.  -ICU electrolyte replacement protocol     Endocrine:    -No history of DM  -SSI for post op period.     ID/ Antibiotics:  #Peritonitis from perforated duodenum  -Continue ciprofloxacin and metronidazole for 4 days total per Surgery team.     Heme:     -Hemoglobin and platelets stable.      Prophylaxis:    -Heparin  -Discuss chemoprophylaxis with Surgery team on POD1.     MSK:    -PT and OT  consulted. Appreciate recs.     Lines/ tubes/ drains:  -Nasima Wilson NG, 2 abd JPs     Disposition:  -Transfer to floor     Patient discussed with Surgical ICU staff.    Carlos Mckay MD  General Surgery  [phone]      - - - - - - - - - - - - - - - - - - - - - - - - - - - - - - - - - - - - - - - - - - - - - - - - - - - - - - - - - - - - - - - - - - - - - - - -   S: RADHA overnight. Abdominal pain this morning. -flatus. Denies chestpain/sob.       PHYSICAL EXAMINATION:  Temp:  [96.6  F (35.9  C)-99  F (37.2  C)] 98.7  F (37.1  C)  Heart Rate:  [77-96] 90  Resp:  [12-28] 12  BP: ()/(47-89) 97/52  SpO2:  [95 %-100 %] 98 %    General appearance: in mild distress laying in bed   Neuro: A & O x 3; follows commands, moving all extremities spontaneously   Pulm: Non-labored breathing, saturating well on 2L NC. CTAB.  CV: Hemodynamically stable. RRR.  Abd: soft; nondistended, TTP in upper abd, focal peritonitis in epigastric area, ND, lap incisions c/d/i, 2 YOSELYN drains with bilious output.    Extremities: no edema  Skin: warm and well-perfused.     LABS: Reviewed.   Arterial Blood Gases   No lab results found in last 7 days.  Complete Blood Count     Recent Labs  Lab 06/19/17  0345 06/18/17  1333 06/18/17  1120 06/18/17  0120 06/12/17  1517   WBC 11.5*  --  8.3 8.2 12.3*   HGB 12.8  --  13.5 14.2 13.8    262 247 296 330     Basic Metabolic Panel    Recent Labs  Lab 06/19/17  0345 06/18/17  1120 06/18/17  0120 06/12/17  1517    140 142 138   POTASSIUM 3.6 4.2 2.9* 3.6   CHLORIDE 111* 106 105 103   CO2 22 23 22 25   BUN 35* 35* 35* 33*   CR 0.73 0.87 0.69 0.85   * 181* 170* 99     Liver Function Tests    Recent Labs  Lab 06/19/17  0345 06/18/17  1120 06/18/17  0326 06/18/17  0120 06/12/17  1517   * 977*  --  20 14   * 497*  --  15 14   ALKPHOS 148 176*  --  89 94   BILITOTAL 1.0 2.7*  --  0.5 0.6   ALBUMIN 2.5* 3.2*  --  3.1* 3.8   INR  --  1.16* 1.09  --   --      Pancreatic  Enzymes    Recent Labs  Lab 06/19/17  0345 06/18/17  0120   LIPASE 330 1224*   AMYLASE 84  --      Coagulation Profile    Recent Labs  Lab 06/18/17  1120 06/18/17  0326   INR 1.16* 1.09     Lactate  Invalid input(s): LACTATE    IMAGING:  No results found for this or any previous visit (from the past 24 hour(s)).     Heart Failure/MI/Rivaroxaban/Xarelto

## 2024-10-25 NOTE — H&P
SURGICAL ICU ADMISSION NOTE  6/18/2017    PRIMARY TEAM: General Surgery   PRIMARY PHYSICIAN: BIPIN Fletcher MD    REASON FOR CRITICAL CARE ADMISSION: Post op monitoring   ADMITTING PHYSICIAN: NATHALIA López MD    ASSESSMENT:  Adia Del Valle is a 89 year old year old female --with a past history of HTN, HL, cardiomegaly, cholecystitis s/p lap cholecystectomy (2007), and OA on chronic naproxen-- who was transferred from [place] 6/18 with three days of abdominal pain and imaging consistent with perforated viscus, presumed to be gastric based on imaging. She was taken to OR for exploratory laparoscopy and repair of duodenal ulcer primarily and with Juan J patch 6/18 by Dr Fletcher.    PLAN:   Neuro/ pain/ sedation:  #Acute post op pain  -Monitor neurological status.  -PRN analgesics     Pulmonary care:   -Supplemental oxygen to keep saturation above 92 %.  -Incentive spirometer every 15- 30 minutes when awake.  -Capnography     Cardiovascular:     #HTN  #HL  - Hemodynamically stable.   - PRN antihypertensives.   - Hold home meds for now as there is no enteral access.     GI/Nutrition:   #Duodenal perforation s/p repair  -NPO and mIVF  -NG to LIS  -Elevated liver enzymes post op most likely secondary to liver manipulation and bile leak from duodenal perforation.  -YOSELYN drain cares     Renal/Fluids/ Electrolytes:   -Cr slightly elevated from baseline. BUN elevated as well. Continue close monitoring.   -Repeat lactate at 22:00 today.   -Monitor UOP.  -ICU electrolyte replacement protocol     Endocrine:    -No history of DM  -SSI for post op period.     ID/ Antibiotics:  #Peritonitis from perforated duodenum  -Continue ciprofloxacin and metronidazole per Surgery team.     Heme:     -Hemoglobin and platelets stable.      Prophylaxis:    -Mechanical prophylaxis for DVT.  -Discuss chemoprophylaxis with Surgery team on POD1.     MSK:    -PT and OT consulted. Appreciate recs.     Lines/ tubes/ drains:  -PIVs, [place], NG, 2 abd JPs      Disposition:  -Surgical ICU.     Patient discussed with Surgical ICU staff.    Michael Perez, PGY2  [phone]     - - - - - - - - - - - - - - - - - - - - - - - - - - - - - - - - - - - - - - - - - - - - - - - - - - - - - - - - - - - - - - - - - - - - - - - -     HPI:  Adia Del Valle is a 89 year old year old female --with a past history of HTN, HL, cardiomegaly, OA on naproxen, and cholecystitis s/p lap cholecystectomy (2007)-- who was transferred from [place] 6/18 with three days of abdominal pain and imaging consistent with perforated viscus,  presumed to be gastric based on imaging. She was taken to OR for exploratory laparoscopy and repair of duodenal ulcer primarily and with Juan J patch 6/18 by Dr Fletcher. She was hemodynamically stable during the case and was extubated immediately post op. She did not require any blood products. Received 1L of albumin and crystalloids. Adequate UOP during the case and in PACU.     REVIEW OF SYSTEMS:   10 point ROS was negative except for items mentioned in HPI.    PAST MEDICAL HISTORY:    has a past medical history of Cardiomegaly; Cholecystitis, unspecified; Disorder of bone and cartilage, unspecified; Generalized osteoarthrosis, unspecified site; Lump or mass in breast; Pure hypercholesterolemia; Unspecified essential hypertension; and Unspecified venous (peripheral) insufficiency. She also has no past medical history of Difficult intubation; Malignant hyperthermia; or Spinal headache.    SURGICAL HISTORY:    has a past surgical history that includes surgical history of -; surgical history of -; cholecystectomy, laporoscopic (8/07); surgical history of - (2008); surgical history of - (2009); Endoscopic release carpal tunnel (4/25/2012); cataract iol, rt/lt; tonsillectomy; colonoscopy; and Phacoemulsification clear cornea with standard intraocular lens implant (5/23/2013).    SOCIAL HISTORY:   No smoking  No alcohol use    FAMILY  HISTORY:  Non-contributory.    ALLERGIES:      Allergies   Allergen Reactions     Codeine Sulfate      Dizzy, nauseated     Tramadol Other (See Comments)     dizzy     MEDICATIONS:    No current facility-administered medications on file prior to encounter.   Current Outpatient Prescriptions on File Prior to Encounter:  hydrochlorothiazide (HYDRODIURIL) 25 MG tablet TAKE 1/2 TABLET(12.5 MG) BY MOUTH DAILY   hydrochlorothiazide (HYDRODIURIL) 25 MG tablet Take 0.5 tablets (12.5 mg) by mouth daily   Naproxen Sodium (ALEVE PO) Take by mouth 2 times daily   saccharomyces boulardii (FLORASTOR) 250 MG capsule Take 100 mg by mouth   polyethylene glycol (MIRALAX/GLYCOLAX) powder    Multiple Vitamins-Minerals (PRESERVISION AREDS 2) CAPS Take 2 capsules by mouth daily   Coenzyme Q10 (CO Q 10 PO) Take by mouth daily   Tetrahydrozoline HCl (EYE DROPS OP) EYE DROPS   calcium-vitamin D (CALTRATE) 600-400 MG-UNIT per tablet Take 1 tablet by mouth daily   ASPIRIN 81 MG OR TABS 1 tab po QD (Once per day)   UNABLE TO FIND MEDICATION NAME: Preservision 2 times daily     PHYSICAL EXAMINATION:  Temp:  [96.6  F (35.9  C)-97.4  F (36.3  C)] 97.4  F (36.3  C)  Heart Rate:  [77-84] 84  Resp:  [16-24] 16  BP: (109-146)/(47-89) 138/81  SpO2:  [93 %-100 %] 95 %    General appearance: in mild distress from abdominal pain.   Neuro: A & O x 3; follows commands, moving all extremities spontaneously   Pulm: Non-labored breathing, saturating well on 2L NC.   CV: Hemodynamically stable.   Abd: soft; appropriately TTP in upper abd, ND, lap incisions c/d/i, 2 YOSELYN drains with SS output.    Extremities: no edema  Skin: warm and well-perfused.     LABS: Reviewed.   Arterial Blood Gases   No lab results found in last 7 days.  Complete Blood Count     Recent Labs  Lab 06/18/17  1120 06/18/17  0120 06/12/17  1517   WBC 8.3 8.2 12.3*   HGB 13.5 14.2 13.8    296 330     Basic Metabolic Panel    Recent Labs  Lab 06/18/17  1120 06/18/17  0120 06/12/17  1517     142 138   POTASSIUM 4.2 2.9* 3.6   CHLORIDE 106 105 103   CO2 23 22 25   BUN 35* 35* 33*   CR 0.87 0.69 0.85   * 170* 99     Liver Function Tests    Recent Labs  Lab 06/18/17  1120 06/18/17  0326 06/18/17  0120 06/12/17  1517   *  --  20 14   *  --  15 14   ALKPHOS 176*  --  89 94   BILITOTAL 2.7*  --  0.5 0.6   ALBUMIN 3.2*  --  3.1* 3.8   INR 1.16* 1.09  --   --      Pancreatic Enzymes    Recent Labs  Lab 06/18/17  0120   LIPASE 1224*     Coagulation Profile    Recent Labs  Lab 06/18/17  1120 06/18/17  0326   INR 1.16* 1.09     Lactate  Invalid input(s): LACTATE    IMAGING:  Recent Results (from the past 24 hour(s))   CT Abdomen Pelvis w Contrast    Narrative    CT ABDOMEN PELVIS W CONTRAST 6/18/2017 2:18 AM    HISTORY: Left-sided abdominal pain.    COMPARISON: None.    TECHNIQUE:  Abdomen and pelvis CT was performed following intravenous  administration of 65 cc Isovue-370.  Radiation dose for this scan was  reduced using automated exposure control, adjustment of the mA and/or  kV according to patient size, or iterative reconstruction technique.    FINDINGS: Basilar scarring. No pleural effusion.    Large amount of free intraperitoneal air. The wall of the stomach is  indistinct and the appearance is suspicious for a ruptured gastric  ulcer. There are moderately dilated loops of inflamed appearing small  bowel in the left hemiabdomen associated with some free fluid.  Remainder of the small and large bowel has normal caliber. No  loculated collection to suggest abscess.    Intrahepatic biliary dilatation. Cholecystectomy clips. Spleen,  pancreas, adrenal glands and kidneys are unremarkable.    Multiple uterine fibroids. Urinary bladder is distended with normal  wall thickness. Small amount of free fluid in the dependent pelvis.    Dense atherosclerotic vascular calcification without evidence of  aneurysm.      Impression    IMPRESSION: Large amount of free intraperitoneal air.  Indistinct  gastric wall suggests the possibility of a ruptured gastric ulcer.  Moderate free fluid in the abdomen without abscess.    GONZALEZ VEGA MD   Chest  XR, 1 view portable    Narrative    XR CHEST PORT 1 VW 6/18/2017 3:28 AM    HISTORY: Preop.    COMPARISON: None.    FINDINGS: No airspace consolidation, pleural effusion or pneumothorax.  Moderate cardiomegaly.      Impression    IMPRESSION: Cardiomegaly without acute pulmonary abnormality.    GONZALEZ VEGA MD        Yes

## (undated) DEVICE — LINEN TOWEL PACK X30 5481

## (undated) DEVICE — SU ETHILON 3-0 PS-1 18" 1663H

## (undated) DEVICE — DEVICE ENDO STITCH APPLIER 10MM 173016

## (undated) DEVICE — SOL WATER IRRIG 1000ML BOTTLE 2F7114

## (undated) DEVICE — ENDO BITE BLOCK ADULT OMNI-BLOC

## (undated) DEVICE — LIGHT HANDLE X1 31140133

## (undated) DEVICE — PAD CHUX UNDERPAD 23X24" 7136

## (undated) DEVICE — ENDO CAP AND TUBING STERILE FOR ENDOGATOR  100130

## (undated) DEVICE — HOLDER TUBE NASOGASTRIC 160

## (undated) DEVICE — SU MONOCRYL 4-0 RB-1 27" Y214H

## (undated) DEVICE — CATH TRAY FOLEY SURESTEP 16FR W/URNE MTR STLK LATEX A303316A

## (undated) DEVICE — SU PDS II 0 TP-1 60" Z991G

## (undated) DEVICE — Device

## (undated) DEVICE — ENDO TUBING CO2 SMARTCAP STERILE DISP 100145CO2EXT

## (undated) DEVICE — DRAIN JACKSON PRATT ROUND W/TROCAR 19FR JP-HUR195

## (undated) DEVICE — WIPES FOLEY CARE SURESTEP PROVON DFC100

## (undated) DEVICE — DRAIN JACKSON PRATT RESERVOIR 100ML SU130-1305

## (undated) DEVICE — SUCTION MANIFOLD DORNOCH ULTRA CART UL-CL500

## (undated) DEVICE — SUCTION IRR STRYKERFLOW II W/TIP 250-070-520

## (undated) DEVICE — ENDO TROCAR OPTICAL 12MM VERSAONE W/STD FIX CAN UNVCA12STF

## (undated) DEVICE — ANTIFOG SOLUTION W/FOAM PAD 31142527

## (undated) DEVICE — ENDO TROCAR 05MM VERSAONE BLADELESS W/STD FIX CAN NONB5STF

## (undated) DEVICE — PACK ENDOSCOPY GI CUSTOM UMMC

## (undated) DEVICE — KIT ENDO FIRST STEP DISINFECTANT 200ML W/POUCH EP-4

## (undated) DEVICE — DRAIN BLAKE 19FR W/TROCAR SIL

## (undated) DEVICE — ENDO SHEARS 5MM 176643

## (undated) DEVICE — NDL INSUFFLATION 120MM VERRES 172015

## (undated) DEVICE — DRSG GAUZE 4X4" TRAY 6939

## (undated) DEVICE — GLOVE PROTEXIS POWDER FREE SMT 7.5  2D72PT75X

## (undated) DEVICE — SYR 30ML SLIP TIP W/O NDL 302833

## (undated) DEVICE — SU DERMABOND ADVANCED .7ML DNX12

## (undated) DEVICE — TAPE DURAPORE 3" SILK 1538-3

## (undated) DEVICE — ESU GROUND PAD ADULT W/CORD E7507

## (undated) DEVICE — ENDO CONNECTOR ENDOGATOR AUX WATER JET FOR OLYMPUS SCOPE

## (undated) DEVICE — GUIDEWIRE TERUMO .035X260 3CM STR TIP GS3504

## (undated) DEVICE — ESU LIGASURE MARYLAND JAW OPEN SEALER/DVDR 5MMX37CM LF1737

## (undated) DEVICE — TUBE NASOGASTRIC FEEDING OVER WIRE 12FRX22" CORFLO 30-4602

## (undated) DEVICE — ENDO CANNULA 05MM VERSAONE UNIVERSAL UNVCA5STF

## (undated) DEVICE — GLOVE PROTEXIS BLUE W/NEU-THERA 7.5  2D73EB75

## (undated) DEVICE — SU ENDO STITCH SURGIDAC 2-0 ES-9 7" TRIPLE STITCH 170041

## (undated) DEVICE — PREP CHLORAPREP 26ML TINTED ORANGE  260815

## (undated) DEVICE — DEVICE SUTURE GRASPER TROCAR CLOSURE 14GA PMITCSG

## (undated) DEVICE — SOL NACL 0.9% IRRIG 3000ML BAG 2B7477

## (undated) DEVICE — TUBING SUCTION 10'X3/16" N510

## (undated) DEVICE — LINEN TOWEL PACK X6 WHITE 5487

## (undated) RX ORDER — FENTANYL CITRATE 50 UG/ML
INJECTION, SOLUTION INTRAMUSCULAR; INTRAVENOUS
Status: DISPENSED
Start: 2017-01-01

## (undated) RX ORDER — PROPOFOL 10 MG/ML
INJECTION, EMULSION INTRAVENOUS
Status: DISPENSED
Start: 2017-01-01

## (undated) RX ORDER — LABETALOL HYDROCHLORIDE 5 MG/ML
INJECTION, SOLUTION INTRAVENOUS
Status: DISPENSED
Start: 2017-01-01

## (undated) RX ORDER — DEXAMETHASONE SODIUM PHOSPHATE 4 MG/ML
INJECTION, SOLUTION INTRA-ARTICULAR; INTRALESIONAL; INTRAMUSCULAR; INTRAVENOUS; SOFT TISSUE
Status: DISPENSED
Start: 2017-01-01

## (undated) RX ORDER — LIDOCAINE HYDROCHLORIDE 20 MG/ML
INJECTION, SOLUTION EPIDURAL; INFILTRATION; INTRACAUDAL; PERINEURAL
Status: DISPENSED
Start: 2017-01-01

## (undated) RX ORDER — ONDANSETRON 2 MG/ML
INJECTION INTRAMUSCULAR; INTRAVENOUS
Status: DISPENSED
Start: 2017-01-01

## (undated) RX ORDER — CEFAZOLIN SODIUM 1 G/3ML
INJECTION, POWDER, FOR SOLUTION INTRAMUSCULAR; INTRAVENOUS
Status: DISPENSED
Start: 2017-01-01

## (undated) RX ORDER — ESMOLOL HYDROCHLORIDE 10 MG/ML
INJECTION INTRAVENOUS
Status: DISPENSED
Start: 2017-01-01

## (undated) RX ORDER — ETOMIDATE 2 MG/ML
INJECTION INTRAVENOUS
Status: DISPENSED
Start: 2017-01-01

## (undated) RX ORDER — PHENYLEPHRINE HCL IN 0.9% NACL 1 MG/10 ML
SYRINGE (ML) INTRAVENOUS
Status: DISPENSED
Start: 2017-01-01

## (undated) RX ORDER — EPHEDRINE SULFATE 50 MG/ML
INJECTION, SOLUTION INTRAMUSCULAR; INTRAVENOUS; SUBCUTANEOUS
Status: DISPENSED
Start: 2017-01-01